# Patient Record
Sex: MALE | Employment: FULL TIME | URBAN - METROPOLITAN AREA
[De-identification: names, ages, dates, MRNs, and addresses within clinical notes are randomized per-mention and may not be internally consistent; named-entity substitution may affect disease eponyms.]

---

## 2018-03-25 ENCOUNTER — HOSPITAL ENCOUNTER (EMERGENCY)
Facility: HOSPITAL | Age: 53
Discharge: HOME | End: 2018-03-25
Attending: EMERGENCY MEDICINE
Payer: COMMERCIAL

## 2018-03-25 ENCOUNTER — APPOINTMENT (OUTPATIENT)
Dept: CT IMAGING | Facility: HOSPITAL | Age: 53
End: 2018-03-25
Attending: EMERGENCY MEDICINE
Payer: COMMERCIAL

## 2018-03-25 ENCOUNTER — APPOINTMENT (OUTPATIENT)
Dept: MRI IMAGING | Facility: HOSPITAL | Age: 53
End: 2018-03-25
Attending: EMERGENCY MEDICINE
Payer: COMMERCIAL

## 2018-03-25 VITALS
HEIGHT: 76 IN | SYSTOLIC BLOOD PRESSURE: 184 MMHG | WEIGHT: 315 LBS | RESPIRATION RATE: 16 BRPM | DIASTOLIC BLOOD PRESSURE: 91 MMHG | TEMPERATURE: 98 F | OXYGEN SATURATION: 96 % | HEART RATE: 75 BPM | BODY MASS INDEX: 38.36 KG/M2

## 2018-03-25 DIAGNOSIS — I10 HYPERTENSION, UNSPECIFIED TYPE: Primary | ICD-10-CM

## 2018-03-25 LAB
BUN BLD-MCNC: 17 MG/DL (ref 8–20)
CALCIUM BLD-MCNC: 8.6 MG/DL (ref 8.3–10.3)
CHLORIDE: 108 MMOL/L (ref 101–111)
CO2: 23 MMOL/L (ref 22–32)
CREAT BLD-MCNC: 1.1 MG/DL (ref 0.7–1.3)
GLUCOSE BLD-MCNC: 115 MG/DL (ref 70–99)
POTASSIUM SERPL-SCNC: 3.8 MMOL/L (ref 3.6–5.1)
SODIUM SERPL-SCNC: 142 MMOL/L (ref 136–144)

## 2018-03-25 PROCEDURE — 70450 CT HEAD/BRAIN W/O DYE: CPT | Performed by: EMERGENCY MEDICINE

## 2018-03-25 PROCEDURE — 99285 EMERGENCY DEPT VISIT HI MDM: CPT

## 2018-03-25 PROCEDURE — 93005 ELECTROCARDIOGRAM TRACING: CPT

## 2018-03-25 PROCEDURE — 72141 MRI NECK SPINE W/O DYE: CPT | Performed by: EMERGENCY MEDICINE

## 2018-03-25 PROCEDURE — 80048 BASIC METABOLIC PNL TOTAL CA: CPT | Performed by: EMERGENCY MEDICINE

## 2018-03-25 PROCEDURE — 36415 COLL VENOUS BLD VENIPUNCTURE: CPT

## 2018-03-25 PROCEDURE — 72125 CT NECK SPINE W/O DYE: CPT | Performed by: EMERGENCY MEDICINE

## 2018-03-25 PROCEDURE — 93010 ELECTROCARDIOGRAM REPORT: CPT

## 2018-03-25 PROCEDURE — 90471 IMMUNIZATION ADMIN: CPT

## 2018-03-25 RX ORDER — MULTIVIT-MIN/FOLIC ACID/LUTEIN 400-250MCG
1 TABLET,CHEWABLE ORAL DAILY
COMMUNITY
End: 2018-08-03 | Stop reason: ALTCHOICE

## 2018-03-25 RX ORDER — AMLODIPINE BESYLATE 5 MG/1
5 TABLET ORAL DAILY
Qty: 30 TABLET | Refills: 0 | Status: SHIPPED | OUTPATIENT
Start: 2018-03-25 | End: 2018-04-24

## 2018-03-25 NOTE — ED INITIAL ASSESSMENT (HPI)
Patient arrives with c/o neck pain and abrasions to left side of face after falling this morning. Patient states he tripped over uneven sidewalk. C/O neck pain with palpation and when he bends his head down. Not UTD on tetanus.

## 2018-03-26 LAB
ATRIAL RATE: 79 BPM
P AXIS: 46 DEGREES
P-R INTERVAL: 204 MS
Q-T INTERVAL: 380 MS
QRS DURATION: 82 MS
QTC CALCULATION (BEZET): 435 MS
R AXIS: 16 DEGREES
T AXIS: 134 DEGREES
VENTRICULAR RATE: 79 BPM

## 2018-03-26 NOTE — ED PROVIDER NOTES
Patient Seen in: [facility] Emergency Department    History   Patient presents with:  Contusion (musculoskeletal)    Stated Complaint: trip and fall    HPI    58-year-old with a history of obesity presents for evaluation after a fall.   At 6:00 this Pulse 82   Temp 97.9 °F (36.6 °C) (Oral)   Resp 16   Ht 193 cm (6' 4\")   Wt (!) 151.5 kg   SpO2 96%   BMI 40.66 kg/m²         Physical Exam    General: Patient is awake, alert in no acute distress.    HEENT: Abrasion to the left forehead, nasal bridge, lef is chronic without acute edema. There is no acute bony injury to the cervical spine. 2.  Moderate multilevel degenerative changes of the cervical spine most pronounced at C6-7 with bilateral neural foraminal narrowing, right greater than left.      3. medications    AmLODIPine Besylate 5 MG Oral Tab  Take 1 tablet (5 mg total) by mouth daily.   Qty: 30 tablet Refills: 0

## 2018-08-02 ENCOUNTER — HOSPITAL (OUTPATIENT)
Dept: OTHER | Age: 53
End: 2018-08-02
Attending: SPECIALIST

## 2018-08-02 ENCOUNTER — CHARTING TRANS (OUTPATIENT)
Dept: OTHER | Age: 53
End: 2018-08-02

## 2018-08-03 ENCOUNTER — TELEPHONE (OUTPATIENT)
Dept: INTERNAL MEDICINE CLINIC | Facility: CLINIC | Age: 53
End: 2018-08-03

## 2018-08-03 ENCOUNTER — LAB ENCOUNTER (OUTPATIENT)
Dept: LAB | Age: 53
End: 2018-08-03
Attending: STUDENT IN AN ORGANIZED HEALTH CARE EDUCATION/TRAINING PROGRAM
Payer: COMMERCIAL

## 2018-08-03 ENCOUNTER — OFFICE VISIT (OUTPATIENT)
Dept: INTERNAL MEDICINE CLINIC | Facility: CLINIC | Age: 53
End: 2018-08-03
Payer: COMMERCIAL

## 2018-08-03 VITALS
HEIGHT: 76 IN | SYSTOLIC BLOOD PRESSURE: 144 MMHG | DIASTOLIC BLOOD PRESSURE: 96 MMHG | HEART RATE: 76 BPM | WEIGHT: 315 LBS | RESPIRATION RATE: 16 BRPM | BODY MASS INDEX: 38.36 KG/M2 | TEMPERATURE: 98 F

## 2018-08-03 DIAGNOSIS — Z13.0 SCREENING FOR IRON DEFICIENCY ANEMIA: ICD-10-CM

## 2018-08-03 DIAGNOSIS — I10 ESSENTIAL HYPERTENSION: Primary | ICD-10-CM

## 2018-08-03 DIAGNOSIS — Z13.29 SCREENING FOR THYROID DISORDER: ICD-10-CM

## 2018-08-03 DIAGNOSIS — Z13.1 SCREENING FOR DIABETES MELLITUS: ICD-10-CM

## 2018-08-03 DIAGNOSIS — Z13.228 SCREENING FOR METABOLIC DISORDER: ICD-10-CM

## 2018-08-03 DIAGNOSIS — G47.33 OSA (OBSTRUCTIVE SLEEP APNEA): ICD-10-CM

## 2018-08-03 DIAGNOSIS — Z13.220 SCREENING FOR LIPOID DISORDERS: ICD-10-CM

## 2018-08-03 DIAGNOSIS — Z12.5 SPECIAL SCREENING FOR MALIGNANT NEOPLASM OF PROSTATE: ICD-10-CM

## 2018-08-03 DIAGNOSIS — I10 ESSENTIAL HYPERTENSION: ICD-10-CM

## 2018-08-03 DIAGNOSIS — Z13.89 SCREENING FOR GENITOURINARY CONDITION: ICD-10-CM

## 2018-08-03 DIAGNOSIS — E55.9 VITAMIN D DEFICIENCY: ICD-10-CM

## 2018-08-03 DIAGNOSIS — Z00.00 ENCOUNTER FOR ANNUAL PHYSICAL EXAM: Primary | ICD-10-CM

## 2018-08-03 LAB
ALBUMIN SERPL-MCNC: 4 G/DL (ref 3.5–4.8)
ALBUMIN/GLOB SERPL: 1.1 {RATIO} (ref 1–2)
ALP LIVER SERPL-CCNC: 97 U/L (ref 45–117)
ALT SERPL-CCNC: 34 U/L (ref 17–63)
ANION GAP SERPL CALC-SCNC: 5 MMOL/L (ref 0–18)
AST SERPL-CCNC: 18 U/L (ref 15–41)
BASOPHILS # BLD AUTO: 0.05 X10(3) UL (ref 0–0.1)
BASOPHILS NFR BLD AUTO: 0.9 %
BILIRUB SERPL-MCNC: 0.7 MG/DL (ref 0.1–2)
BUN BLD-MCNC: 13 MG/DL (ref 8–20)
BUN/CREAT SERPL: 12.3 (ref 10–20)
CALCIUM BLD-MCNC: 8.9 MG/DL (ref 8.3–10.3)
CHLORIDE SERPL-SCNC: 107 MMOL/L (ref 101–111)
CHOLEST SMN-MCNC: 166 MG/DL (ref ?–200)
CO2 SERPL-SCNC: 29 MMOL/L (ref 22–32)
COMPLEXED PSA SERPL-MCNC: 0.53 NG/ML (ref 0.01–4)
CREAT BLD-MCNC: 1.06 MG/DL (ref 0.7–1.3)
EOSINOPHIL # BLD AUTO: 0.21 X10(3) UL (ref 0–0.3)
EOSINOPHIL NFR BLD AUTO: 3.9 %
ERYTHROCYTE [DISTWIDTH] IN BLOOD BY AUTOMATED COUNT: 13.3 % (ref 11.5–16)
EST. AVERAGE GLUCOSE BLD GHB EST-MCNC: 94 MG/DL (ref 68–126)
GLOBULIN PLAS-MCNC: 3.7 G/DL (ref 2.5–3.7)
GLUCOSE BLD-MCNC: 96 MG/DL (ref 70–99)
HBA1C MFR BLD HPLC: 4.9 % (ref ?–5.7)
HCT VFR BLD AUTO: 45.8 % (ref 37–53)
HDLC SERPL-MCNC: 29 MG/DL (ref 40–59)
HGB BLD-MCNC: 16.1 G/DL (ref 13–17)
IMMATURE GRANULOCYTE COUNT: 0.03 X10(3) UL (ref 0–1)
IMMATURE GRANULOCYTE RATIO %: 0.6 %
LDLC SERPL CALC-MCNC: 69 MG/DL (ref ?–100)
LYMPHOCYTES # BLD AUTO: 1.19 X10(3) UL (ref 0.9–4)
LYMPHOCYTES NFR BLD AUTO: 22.1 %
M PROTEIN MFR SERPL ELPH: 7.7 G/DL (ref 6.1–8.3)
MCH RBC QN AUTO: 30.9 PG (ref 27–33.2)
MCHC RBC AUTO-ENTMCNC: 35.2 G/DL (ref 31–37)
MCV RBC AUTO: 87.9 FL (ref 80–99)
MONOCYTES # BLD AUTO: 0.42 X10(3) UL (ref 0.1–1)
MONOCYTES NFR BLD AUTO: 7.8 %
NEUTROPHIL ABS PRELIM: 3.49 X10 (3) UL (ref 1.3–6.7)
NEUTROPHILS # BLD AUTO: 3.49 X10(3) UL (ref 1.3–6.7)
NEUTROPHILS NFR BLD AUTO: 64.7 %
NONHDLC SERPL-MCNC: 137 MG/DL (ref ?–130)
OSMOLALITY SERPL CALC.SUM OF ELEC: 292 MOSM/KG (ref 275–295)
PLATELET # BLD AUTO: 163 10(3)UL (ref 150–450)
POTASSIUM SERPL-SCNC: 4.4 MMOL/L (ref 3.6–5.1)
RBC # BLD AUTO: 5.21 X10(6)UL (ref 4.3–5.7)
RED CELL DISTRIBUTION WIDTH-SD: 43.2 FL (ref 35.1–46.3)
SODIUM SERPL-SCNC: 141 MMOL/L (ref 136–144)
TRIGL SERPL-MCNC: 338 MG/DL (ref 30–149)
TSI SER-ACNC: 2.29 MIU/ML (ref 0.35–5.5)
VIT D+METAB SERPL-MCNC: 18.4 NG/ML (ref 30–100)
VLDLC SERPL CALC-MCNC: 68 MG/DL (ref 0–30)
WBC # BLD AUTO: 5.4 X10(3) UL (ref 4–13)

## 2018-08-03 PROCEDURE — 36415 COLL VENOUS BLD VENIPUNCTURE: CPT | Performed by: STUDENT IN AN ORGANIZED HEALTH CARE EDUCATION/TRAINING PROGRAM

## 2018-08-03 PROCEDURE — 80061 LIPID PANEL: CPT | Performed by: STUDENT IN AN ORGANIZED HEALTH CARE EDUCATION/TRAINING PROGRAM

## 2018-08-03 PROCEDURE — 82306 VITAMIN D 25 HYDROXY: CPT | Performed by: STUDENT IN AN ORGANIZED HEALTH CARE EDUCATION/TRAINING PROGRAM

## 2018-08-03 PROCEDURE — 84153 ASSAY OF PSA TOTAL: CPT | Performed by: STUDENT IN AN ORGANIZED HEALTH CARE EDUCATION/TRAINING PROGRAM

## 2018-08-03 PROCEDURE — 83036 HEMOGLOBIN GLYCOSYLATED A1C: CPT | Performed by: STUDENT IN AN ORGANIZED HEALTH CARE EDUCATION/TRAINING PROGRAM

## 2018-08-03 PROCEDURE — 80050 GENERAL HEALTH PANEL: CPT | Performed by: STUDENT IN AN ORGANIZED HEALTH CARE EDUCATION/TRAINING PROGRAM

## 2018-08-03 PROCEDURE — 99386 PREV VISIT NEW AGE 40-64: CPT | Performed by: STUDENT IN AN ORGANIZED HEALTH CARE EDUCATION/TRAINING PROGRAM

## 2018-08-03 RX ORDER — LISINOPRIL 20 MG/1
20 TABLET ORAL DAILY
Qty: 30 TABLET | Refills: 0 | Status: SHIPPED | OUTPATIENT
Start: 2018-08-03 | End: 2018-09-19 | Stop reason: ALTCHOICE

## 2018-08-03 RX ORDER — LISINOPRIL 10 MG/1
20 TABLET ORAL DAILY
COMMUNITY
End: 2018-08-03

## 2018-08-03 RX ORDER — DIPHENOXYLATE HYDROCHLORIDE AND ATROPINE SULFATE 2.5; .025 MG/1; MG/1
1 TABLET ORAL
COMMUNITY

## 2018-08-03 NOTE — PROGRESS NOTES
[address] [facility]    REASON FOR VISIT:    Kay Randolph is a 47year old male who presents for an [address]. Current Complaints:  Reports compliance with the medications, denies any side effects.   HTN: reports he has been diagno found for: GONOCOCCUS    HIV Screening For all adults age 22-65, older adults at increased risk No results found for: HIV    Syphilis Screening Screen if pregnant or high risk No results found for: RPR    Hepatitis C Screening Screen those at high risk plu Encounters:  08/03/18 : (!) 350 lb  03/25/18 : (!) 334 lb  03/25/18 : (!) 334 lb    Body mass index is 42.6 kg/m². Constitutional: He appears well-developed, nourished, and his stated age.  Vital signs reviewed  Pleasant man   Head: Normocephalic and atr Body mass index is 42.6 kg/m². Recommended low fat diet and aerobic exercise 30 minutes three times weekly. Health maintenance: will check fasting Lipids, CMP, CBC, TSH, UA, HbA1C and PSA. Vaccinations: Tdap utd. Screening colonoscopy: utd.   Pt info h Tetanus     Immunization History   Administered Date(s) Administered   • TDAP 04/21/2010, 03/25/2018       Influenza Annually   Pneumococcal if high risk   Td/Tdap once then every 10 years   HPV Males 11-21   Zoster (Shingles) 60 and older: one dose   [place] [facility])

## 2018-08-03 NOTE — TELEPHONE ENCOUNTER
Pt says he discussed with Dr Kathy Marquis increasing his blood pressure medication today at his appointment but his pharmacy has not received the prescription - please send to [facility] on file.

## 2018-08-03 NOTE — PATIENT INSTRUCTIONS
Prevention Guidelines, Men Ages 48 to 59  Screening tests and vaccines are an important part of managing your health. A screening test is done to find possible disorders or diseases in people who don't have any symptoms.  The goal is to find a disease ear High cholesterol or triglycerides All men in this age group At least every 5 years   HIV Men at increased risk for infection – talk with your healthcare provider At routine exams   Lung cancer Adults age 54 to [de-identified] who have smoked Yearly screening in smokers Pneumococcal conjugate vaccine (PCV13) and pneumococcal polysaccharide vaccine (PPSV23) Men at increased risk for infection – talk with your healthcare provider PCV13: 1 dose ages 23 to 72 (protects against 13 types of pneumococcal bacteria)     PPSV23: 1

## 2018-08-06 ENCOUNTER — TELEPHONE (OUTPATIENT)
Dept: INTERNAL MEDICINE CLINIC | Facility: CLINIC | Age: 53
End: 2018-08-06

## 2018-08-06 DIAGNOSIS — E55.9 VITAMIN D DEFICIENCY: Primary | ICD-10-CM

## 2018-08-06 RX ORDER — ERGOCALCIFEROL 1.25 MG/1
50000 CAPSULE ORAL WEEKLY
Qty: 12 CAPSULE | Refills: 0 | Status: SHIPPED | OUTPATIENT
Start: 2018-08-06 | End: 2018-12-10 | Stop reason: ALTCHOICE

## 2018-08-06 NOTE — TELEPHONE ENCOUNTER
----- Message from Bishop Leyva MD sent at 8/4/2018 10:00 AM CDT -----  Result as follows: No anemia, no signs of diabetes, normal PSA, liver and renal function, normal UA. Normal thyroid function.   Normal total cholesterol, but elevated triglycerides,

## 2018-08-06 NOTE — TELEPHONE ENCOUNTER
Results and recommendations d/w pt he expressed understanding. Rx sent to [facility] and lab order placed.

## 2018-09-05 ENCOUNTER — OFFICE VISIT (OUTPATIENT)
Dept: INTERNAL MEDICINE CLINIC | Facility: CLINIC | Age: 53
End: 2018-09-05
Payer: COMMERCIAL

## 2018-09-05 ENCOUNTER — TELEPHONE (OUTPATIENT)
Dept: INTERNAL MEDICINE CLINIC | Facility: CLINIC | Age: 53
End: 2018-09-05

## 2018-09-05 VITALS
OXYGEN SATURATION: 98 % | RESPIRATION RATE: 16 BRPM | SYSTOLIC BLOOD PRESSURE: 132 MMHG | BODY MASS INDEX: 40.43 KG/M2 | HEIGHT: 74 IN | HEART RATE: 79 BPM | TEMPERATURE: 98 F | DIASTOLIC BLOOD PRESSURE: 90 MMHG | WEIGHT: 315 LBS

## 2018-09-05 DIAGNOSIS — Z91.89 10 YEAR RISK OF MI OR STROKE 7.5% OR GREATER: ICD-10-CM

## 2018-09-05 DIAGNOSIS — Z79.899 LONG-TERM USE OF HIGH-RISK MEDICATION: ICD-10-CM

## 2018-09-05 DIAGNOSIS — E78.2 MIXED HYPERLIPIDEMIA: ICD-10-CM

## 2018-09-05 DIAGNOSIS — B35.1 ONYCHOMYCOSIS OF TOENAIL: ICD-10-CM

## 2018-09-05 DIAGNOSIS — I10 ESSENTIAL HYPERTENSION: Primary | ICD-10-CM

## 2018-09-05 PROCEDURE — 99214 OFFICE O/P EST MOD 30 MIN: CPT | Performed by: STUDENT IN AN ORGANIZED HEALTH CARE EDUCATION/TRAINING PROGRAM

## 2018-09-05 RX ORDER — TERBINAFINE HYDROCHLORIDE 250 MG/1
250 TABLET ORAL DAILY
Qty: 90 TABLET | Refills: 0 | Status: SHIPPED | OUTPATIENT
Start: 2018-09-05 | End: 2018-10-05 | Stop reason: ALTCHOICE

## 2018-09-05 RX ORDER — ATORVASTATIN CALCIUM 20 MG/1
20 TABLET, FILM COATED ORAL NIGHTLY
Qty: 90 TABLET | Refills: 0 | Status: SHIPPED | OUTPATIENT
Start: 2018-09-05 | End: 2018-10-19

## 2018-09-05 RX ORDER — LOSARTAN POTASSIUM 100 MG/1
100 TABLET ORAL DAILY
Qty: 30 TABLET | Refills: 0 | Status: SHIPPED | OUTPATIENT
Start: 2018-09-05 | End: 2018-09-19

## 2018-09-05 NOTE — PROGRESS NOTES
[facility]    REASON FOR VISIT:    Current Complaints: Patient presents with:  Blood Pressure: 1 m fu  Toenail Fungus:  All toes  Derm Problem: Rash right lower leg      HPI:  Sonia Boos is a 47year old male who presents for 2 weeks f/u visual disturbance. Respiratory: Negative for cough and shortness of breath. Cardiovascular: Negative for chest pain and palpitations. Gastrointestinal: Negative for nausea, vomiting, abdominal pain and diarrhea.    Genitourinary: Negative for urgenc Landry Larios is a 47year old male who presents with    Ree Hole was seen today for blood pressure, toenail fungus and derm problem. Diagnoses and all orders for this visit:    Essential hypertension  Not at goal with Lisinopril 20 mg.  Additionally he deve total) by mouth daily. Medications side effects, risk and benefits discussed in length. After visit instructions are provided to the patient. The patient indicates understanding of these issues and agrees to the treatment plan.   The patient is asked to

## 2018-09-05 NOTE — PATIENT INSTRUCTIONS
Nail Fungal Infection  A nail fungal infection changes the way fingernails and toenails look. They may thicken, discolor, change shape, or split. This condition is hard to treat because nails grow slowly and have limited blood supply.  The infection often Call your healthcare provider right away if any of these occur:  · Skin by the nail becomes red, swollen, painful, or drains pus (a creamy yellow or white liquid)  · Side effects from oral anti-fungal medicines  Date Last Reviewed: 8/1/2016 © 2000-2018 Th · signs and symptoms of liver injury like dark yellow or brown urine; general ill feeling or flu-like symptoms; light-belly pain; unusually weak or tired; yellowing of the eyes or skin  · signs and symptoms of muscle injury like dark urine; trouble passing · an unusual or allergic reaction to atorvastatin, other medicines, foods, dyes, or preservatives  · pregnant or trying to get pregnant  · breast-feeding  What should I watch for while using this medicine?   Visit your doctor or health care professional for

## 2018-09-11 ENCOUNTER — HOSPITAL (OUTPATIENT)
Dept: OTHER | Age: 53
End: 2018-09-11
Attending: SPECIALIST

## 2018-09-19 ENCOUNTER — OFFICE VISIT (OUTPATIENT)
Dept: INTERNAL MEDICINE CLINIC | Facility: CLINIC | Age: 53
End: 2018-09-19
Payer: COMMERCIAL

## 2018-09-19 VITALS
SYSTOLIC BLOOD PRESSURE: 146 MMHG | TEMPERATURE: 98 F | RESPIRATION RATE: 16 BRPM | HEIGHT: 74 IN | WEIGHT: 315 LBS | HEART RATE: 93 BPM | DIASTOLIC BLOOD PRESSURE: 98 MMHG | BODY MASS INDEX: 40.43 KG/M2 | OXYGEN SATURATION: 98 %

## 2018-09-19 DIAGNOSIS — I10 ESSENTIAL HYPERTENSION: Primary | ICD-10-CM

## 2018-09-19 PROCEDURE — 99213 OFFICE O/P EST LOW 20 MIN: CPT | Performed by: STUDENT IN AN ORGANIZED HEALTH CARE EDUCATION/TRAINING PROGRAM

## 2018-09-19 RX ORDER — LOSARTAN POTASSIUM 100 MG/1
100 TABLET ORAL DAILY
Qty: 90 TABLET | Refills: 0 | Status: SHIPPED | OUTPATIENT
Start: 2018-09-19 | End: 2018-10-19

## 2018-09-19 RX ORDER — AMLODIPINE BESYLATE 10 MG/1
10 TABLET ORAL DAILY
Qty: 30 TABLET | Refills: 0 | Status: SHIPPED | OUTPATIENT
Start: 2018-09-19 | End: 2018-10-05

## 2018-09-19 NOTE — PROGRESS NOTES
[facility]    REASON FOR VISIT:    Current Complaints: Patient presents with:  Blood Pressure      HPI:  Mike Mckeon is a 47year old male who presents for 2 week HTN f/u.   His medications were adjusted last time with d/c of Lisinopril ( Beth Alvarenga ) 146/98   Pulse 93   Temp 98 °F (36.7 °C)   Resp 16   Ht 74\"   Wt (!) 348 lb   SpO2 98%   BMI 44.68 kg/m²    Wt Readings from Last 6 Encounters:  09/19/18 : (!) 348 lb  09/05/18 : (!) 348 lb 11.2 oz  08/03/18 : (!) 350 lb  03/25/18 : (!) 334 lb  03/25/ Take 1 tablet (10 mg total) by mouth daily. -     losartan 100 MG Oral Tab; Take 1 tablet (100 mg total) by mouth daily. Medications side effects, risk and benefits discussed in length. After visit instructions are provided to the patient.   The patie

## 2018-09-20 ENCOUNTER — TELEPHONE (OUTPATIENT)
Dept: INTERNAL MEDICINE CLINIC | Facility: CLINIC | Age: 53
End: 2018-09-20

## 2018-09-20 NOTE — TELEPHONE ENCOUNTER
Vikcy is calling to follow up on a prior authorization for terbinasine (Lamisil). Please advise. Thank you!

## 2018-09-21 NOTE — TELEPHONE ENCOUNTER
Received notice that insurance info is not in [place]. Called pt to see if he has a RX card, left message.

## 2018-10-05 ENCOUNTER — OFFICE VISIT (OUTPATIENT)
Dept: INTERNAL MEDICINE CLINIC | Facility: CLINIC | Age: 53
End: 2018-10-05
Payer: COMMERCIAL

## 2018-10-05 VITALS
HEART RATE: 78 BPM | HEIGHT: 74 IN | TEMPERATURE: 98 F | BODY MASS INDEX: 40.43 KG/M2 | OXYGEN SATURATION: 98 % | SYSTOLIC BLOOD PRESSURE: 138 MMHG | RESPIRATION RATE: 16 BRPM | DIASTOLIC BLOOD PRESSURE: 88 MMHG | WEIGHT: 315 LBS

## 2018-10-05 DIAGNOSIS — I10 ESSENTIAL HYPERTENSION: ICD-10-CM

## 2018-10-05 PROCEDURE — 99213 OFFICE O/P EST LOW 20 MIN: CPT | Performed by: STUDENT IN AN ORGANIZED HEALTH CARE EDUCATION/TRAINING PROGRAM

## 2018-10-05 RX ORDER — AMLODIPINE BESYLATE 10 MG/1
10 TABLET ORAL DAILY
Qty: 90 TABLET | Refills: 0 | Status: SHIPPED | OUTPATIENT
Start: 2018-10-05 | End: 2018-12-10

## 2018-10-05 NOTE — PROGRESS NOTES
[facility]    REASON FOR VISIT:    Current Complaints: Patient presents with: Follow - Up: HTN      HPI:  Luli Luna is a 46year old male who presents for 1 month f/u for HTN. His medications were adjusted at the last OV.   Patient ta Negative for color change and rash.      EXAM:   /88   Pulse 78   Temp 98.2 °F (36.8 °C) (Oral)   Resp 16   Ht 74\"   Wt (!) 353 lb   SpO2 98%   BMI 45.32 kg/m²    Wt Readings from Last 6 Encounters:  10/05/18 : (!) 353 lb  09/19/18 : (!) 348 lb  09/0 daily.    Medications side effects, risk and benefits discussed in length. After visit instructions are provided to the patient. The patient indicates understanding of these issues and agrees to the treatment plan.     Imaging & Consults:  None       JAMES

## 2018-10-09 ENCOUNTER — HOSPITAL (OUTPATIENT)
Dept: OTHER | Age: 53
End: 2018-10-09
Attending: SPECIALIST

## 2018-10-12 ENCOUNTER — CHARTING TRANS (OUTPATIENT)
Dept: OTHER | Age: 53
End: 2018-10-12

## 2018-10-19 DIAGNOSIS — I10 ESSENTIAL HYPERTENSION: ICD-10-CM

## 2018-10-19 DIAGNOSIS — E78.2 MIXED HYPERLIPIDEMIA: ICD-10-CM

## 2018-10-19 RX ORDER — ATORVASTATIN CALCIUM 20 MG/1
20 TABLET, FILM COATED ORAL NIGHTLY
Qty: 90 TABLET | Refills: 0 | Status: SHIPPED | OUTPATIENT
Start: 2018-10-19 | End: 2018-12-10

## 2018-10-19 RX ORDER — LOSARTAN POTASSIUM 100 MG/1
100 TABLET ORAL DAILY
Qty: 90 TABLET | Refills: 0 | Status: SHIPPED | OUTPATIENT
Start: 2018-10-19 | End: 2018-11-05

## 2018-11-05 ENCOUNTER — NURSE ONLY (OUTPATIENT)
Dept: INTERNAL MEDICINE CLINIC | Facility: CLINIC | Age: 53
End: 2018-11-05
Payer: COMMERCIAL

## 2018-11-05 VITALS — DIASTOLIC BLOOD PRESSURE: 87 MMHG | SYSTOLIC BLOOD PRESSURE: 155 MMHG

## 2018-11-05 RX ORDER — LOSARTAN POTASSIUM AND HYDROCHLOROTHIAZIDE 12.5; 1 MG/1; MG/1
1 TABLET ORAL DAILY
Qty: 30 TABLET | Refills: 0 | Status: SHIPPED | OUTPATIENT
Start: 2018-11-05 | End: 2018-12-04

## 2018-11-05 RX ORDER — LOSARTAN POTASSIUM AND HYDROCHLOROTHIAZIDE 12.5; 1 MG/1; MG/1
1 TABLET ORAL DAILY
COMMUNITY
End: 2018-11-05 | Stop reason: CLARIF

## 2018-11-05 NOTE — PROGRESS NOTES
Dr. Jacky Spain saw patient, advised patient to discontinue Losartan 100mg, and begin Losartan-HCTZ 100-12.5mg, ordered to pharmacy. Patient is aware to  medication. Scheduled nurse visit for 12/5/18 for BP check.

## 2018-11-05 NOTE — PROGRESS NOTES
Patient in office for blood pressure check. Patients blood pressure taken on left arm, whilst sitting. BP reading 155/87. Advised patient to wait in room, Dr. Shari Ramos notified of reading, to advise patient.

## 2018-12-05 RX ORDER — LOSARTAN POTASSIUM AND HYDROCHLOROTHIAZIDE 12.5; 1 MG/1; MG/1
TABLET ORAL
Qty: 90 TABLET | Refills: 0 | Status: SHIPPED | OUTPATIENT
Start: 2018-12-05 | End: 2019-03-03

## 2018-12-10 ENCOUNTER — OFFICE VISIT (OUTPATIENT)
Dept: INTERNAL MEDICINE CLINIC | Facility: CLINIC | Age: 53
End: 2018-12-10
Payer: COMMERCIAL

## 2018-12-10 VITALS
OXYGEN SATURATION: 95 % | DIASTOLIC BLOOD PRESSURE: 90 MMHG | TEMPERATURE: 98 F | WEIGHT: 315 LBS | BODY MASS INDEX: 40.43 KG/M2 | HEIGHT: 74 IN | RESPIRATION RATE: 16 BRPM | HEART RATE: 69 BPM | SYSTOLIC BLOOD PRESSURE: 148 MMHG

## 2018-12-10 DIAGNOSIS — E78.2 MIXED HYPERLIPIDEMIA: ICD-10-CM

## 2018-12-10 DIAGNOSIS — L30.9 ECZEMA OF LOWER LEG: ICD-10-CM

## 2018-12-10 DIAGNOSIS — I10 ESSENTIAL HYPERTENSION: Primary | ICD-10-CM

## 2018-12-10 DIAGNOSIS — E55.9 VITAMIN D DEFICIENCY: ICD-10-CM

## 2018-12-10 PROCEDURE — 99214 OFFICE O/P EST MOD 30 MIN: CPT | Performed by: STUDENT IN AN ORGANIZED HEALTH CARE EDUCATION/TRAINING PROGRAM

## 2018-12-10 RX ORDER — AMLODIPINE BESYLATE 10 MG/1
10 TABLET ORAL DAILY
Qty: 90 TABLET | Refills: 1 | Status: SHIPPED | OUTPATIENT
Start: 2018-12-10 | End: 2019-11-11 | Stop reason: ALTCHOICE

## 2018-12-10 RX ORDER — ATORVASTATIN CALCIUM 20 MG/1
20 TABLET, FILM COATED ORAL NIGHTLY
Qty: 90 TABLET | Refills: 1 | Status: SHIPPED | OUTPATIENT
Start: 2018-12-10 | End: 2019-11-11

## 2018-12-10 RX ORDER — BETAMETHASONE DIPROPIONATE 0.05 %
1 OINTMENT (GRAM) TOPICAL 2 TIMES DAILY
Qty: 50 G | Refills: 0 | Status: SHIPPED | OUTPATIENT
Start: 2018-12-10 | End: 2019-11-11 | Stop reason: ALTCHOICE

## 2018-12-10 NOTE — PROGRESS NOTES
[facility]    REASON FOR VISIT:    Current Complaints: Patient presents with:  Blood Pressure  Rash: red, itchy, itches it then it scabs right shin      HPI:  Sylvie Snow is a 46year old male who presents for HTN f/u.     Patient's blood Musculoskeletal: Negative for arthralgias and gait problem. Skin: Negative for color change and rash.      EXAM:   BP (!) 160/100 (BP Location: Right arm, Patient Position: Sitting, Cuff Size: large)   Pulse 69   Temp 98.3 °F (36.8 °C) (Oral)   Resp 16 hypertension  Uncontrolled due to medication non-compliance. Went over patient medication regimen and dosage, he verbalized understanding that his BP regimen includes 3 medications: Losartan-HCTZ 100-12.5 mg daily and Amlodipine 10 mg daily.    Continue ho

## 2019-01-18 DIAGNOSIS — I10 ESSENTIAL HYPERTENSION: ICD-10-CM

## 2019-01-21 RX ORDER — LOSARTAN POTASSIUM 100 MG/1
TABLET ORAL
Qty: 90 TABLET | Refills: 0 | OUTPATIENT
Start: 2019-01-21

## 2019-03-05 RX ORDER — LOSARTAN POTASSIUM AND HYDROCHLOROTHIAZIDE 12.5; 1 MG/1; MG/1
TABLET ORAL
Qty: 30 TABLET | Refills: 0 | Status: SHIPPED | OUTPATIENT
Start: 2019-03-05 | End: 2019-10-08

## 2019-03-12 ENCOUNTER — TELEPHONE (OUTPATIENT)
Dept: INTERNAL MEDICINE CLINIC | Facility: CLINIC | Age: 54
End: 2019-03-12

## 2019-03-12 NOTE — TELEPHONE ENCOUNTER
Pt calling to find out all the medications/dosages  prescribed for him and requested to be uploaded to [address] Patient also scheduled apt with Onur Almeida for a med check 03-19-19. Please advise. Thank you!

## 2019-03-20 ENCOUNTER — NURSE ONLY (OUTPATIENT)
Dept: INTERNAL MEDICINE CLINIC | Facility: CLINIC | Age: 54
End: 2019-03-20
Payer: COMMERCIAL

## 2019-03-20 VITALS — SYSTOLIC BLOOD PRESSURE: 138 MMHG | DIASTOLIC BLOOD PRESSURE: 82 MMHG

## 2019-10-08 DIAGNOSIS — I10 ESSENTIAL HYPERTENSION: Primary | ICD-10-CM

## 2019-10-08 RX ORDER — LOSARTAN POTASSIUM AND HYDROCHLOROTHIAZIDE 12.5; 1 MG/1; MG/1
1 TABLET ORAL
Qty: 30 TABLET | Refills: 0 | Status: SHIPPED | OUTPATIENT
Start: 2019-10-08 | End: 2019-10-15 | Stop reason: RX

## 2019-10-08 NOTE — TELEPHONE ENCOUNTER
Last OV relevant to medication: 12/10/18  Last refill date: 3/5/19     #/refills: 30/0  When pt was asked to return for OV: 2 weeks  Upcoming appt/reason: 11/11/19, med check    No new CMP ordered. Only refilled for #30 in March. Not taking for 6 months?

## 2019-10-09 DIAGNOSIS — I10 ESSENTIAL HYPERTENSION: ICD-10-CM

## 2019-10-09 RX ORDER — LOSARTAN POTASSIUM AND HYDROCHLOROTHIAZIDE 12.5; 1 MG/1; MG/1
1 TABLET ORAL
Qty: 30 TABLET | Refills: 0 | OUTPATIENT
Start: 2019-10-09

## 2019-10-15 ENCOUNTER — TELEPHONE (OUTPATIENT)
Dept: INTERNAL MEDICINE CLINIC | Facility: CLINIC | Age: 54
End: 2019-10-15

## 2019-10-15 DIAGNOSIS — I10 ESSENTIAL HYPERTENSION: Primary | ICD-10-CM

## 2019-10-15 RX ORDER — HYDROCHLOROTHIAZIDE 12.5 MG/1
12.5 TABLET ORAL DAILY
Qty: 30 TABLET | Refills: 0 | Status: SHIPPED | OUTPATIENT
Start: 2019-10-15 | End: 2019-11-11 | Stop reason: DRUGHIGH

## 2019-10-15 RX ORDER — LOSARTAN POTASSIUM 100 MG/1
100 TABLET ORAL DAILY
Qty: 30 TABLET | Refills: 0 | Status: SHIPPED | OUTPATIENT
Start: 2019-10-15 | End: 2019-11-11

## 2019-10-15 NOTE — TELEPHONE ENCOUNTER
Received fax from [facility] that combination losartan/HCTZ is on back order. New rx sent for losartan and HCTZ separately.

## 2019-10-16 ENCOUNTER — PATIENT MESSAGE (OUTPATIENT)
Dept: INTERNAL MEDICINE CLINIC | Facility: CLINIC | Age: 54
End: 2019-10-16

## 2019-10-16 NOTE — TELEPHONE ENCOUNTER
From: Doris Zurita  To: Shirley Mora MD  Sent: 10/16/2019 1:14 PM CDT  Subject: Prescription Question    [facility] states they can only fill this prescription with two pills and not one So they need a new prescription sent to them     [facility]  [phone]

## 2019-11-07 DIAGNOSIS — I10 ESSENTIAL HYPERTENSION: ICD-10-CM

## 2019-11-11 ENCOUNTER — OFFICE VISIT (OUTPATIENT)
Dept: INTERNAL MEDICINE CLINIC | Facility: CLINIC | Age: 54
End: 2019-11-11
Payer: COMMERCIAL

## 2019-11-11 VITALS
WEIGHT: 315 LBS | TEMPERATURE: 98 F | HEART RATE: 87 BPM | OXYGEN SATURATION: 95 % | SYSTOLIC BLOOD PRESSURE: 155 MMHG | RESPIRATION RATE: 16 BRPM | DIASTOLIC BLOOD PRESSURE: 92 MMHG | HEIGHT: 74 IN | BODY MASS INDEX: 40.43 KG/M2

## 2019-11-11 DIAGNOSIS — Z13.1 SCREENING FOR DIABETES MELLITUS: ICD-10-CM

## 2019-11-11 DIAGNOSIS — Z13.89 SCREENING FOR GENITOURINARY CONDITION: ICD-10-CM

## 2019-11-11 DIAGNOSIS — E78.2 MIXED HYPERLIPIDEMIA: ICD-10-CM

## 2019-11-11 DIAGNOSIS — I10 ESSENTIAL HYPERTENSION: Primary | ICD-10-CM

## 2019-11-11 DIAGNOSIS — Z23 NEED FOR VACCINATION: ICD-10-CM

## 2019-11-11 DIAGNOSIS — E55.9 VITAMIN D DEFICIENCY: ICD-10-CM

## 2019-11-11 DIAGNOSIS — Z13.0 SCREENING FOR IRON DEFICIENCY ANEMIA: ICD-10-CM

## 2019-11-11 DIAGNOSIS — Z13.29 SCREENING FOR THYROID DISORDER: ICD-10-CM

## 2019-11-11 DIAGNOSIS — Z12.5 SCREENING FOR MALIGNANT NEOPLASM OF PROSTATE: ICD-10-CM

## 2019-11-11 PROCEDURE — 99214 OFFICE O/P EST MOD 30 MIN: CPT | Performed by: STUDENT IN AN ORGANIZED HEALTH CARE EDUCATION/TRAINING PROGRAM

## 2019-11-11 PROCEDURE — 90686 IIV4 VACC NO PRSV 0.5 ML IM: CPT | Performed by: STUDENT IN AN ORGANIZED HEALTH CARE EDUCATION/TRAINING PROGRAM

## 2019-11-11 PROCEDURE — 90471 IMMUNIZATION ADMIN: CPT | Performed by: STUDENT IN AN ORGANIZED HEALTH CARE EDUCATION/TRAINING PROGRAM

## 2019-11-11 RX ORDER — HYDROCHLOROTHIAZIDE 12.5 MG/1
TABLET ORAL
Qty: 30 TABLET | Refills: 0 | OUTPATIENT
Start: 2019-11-11

## 2019-11-11 RX ORDER — HYDROCHLOROTHIAZIDE 12.5 MG/1
12.5 TABLET ORAL DAILY
Qty: 90 TABLET | Refills: 1 | Status: CANCELLED | OUTPATIENT
Start: 2019-11-11

## 2019-11-11 RX ORDER — LOSARTAN POTASSIUM 100 MG/1
TABLET ORAL
Qty: 30 TABLET | Refills: 0 | OUTPATIENT
Start: 2019-11-11

## 2019-11-11 RX ORDER — HYDROCHLOROTHIAZIDE 25 MG/1
25 TABLET ORAL DAILY
Qty: 90 TABLET | Refills: 0 | Status: SHIPPED | OUTPATIENT
Start: 2019-11-11 | End: 2020-02-05

## 2019-11-11 RX ORDER — LOSARTAN POTASSIUM 100 MG/1
100 TABLET ORAL DAILY
Qty: 90 TABLET | Refills: 1 | Status: SHIPPED | OUTPATIENT
Start: 2019-11-11 | End: 2020-05-07

## 2019-11-11 NOTE — PROGRESS NOTES
[facility]    REASON FOR VISIT:    Current Complaints: Patient presents with:  Medication Follow-Up: bp meds       HPI:  Fernando No is a 53year old male who presents for 6 months BP f/u.     HTN: Patient reports he has been taking medic (36.8 °C) (Oral)   Resp 16   Ht 74\"   Wt (!) 336 lb 9.6 oz (152.7 kg)   SpO2 95%   BMI 43.22 kg/m²    Wt Readings from Last 6 Encounters:  11/11/19 : (!) 336 lb 9.6 oz (152.7 kg)  12/10/18 : (!) 352 lb 3.2 oz (159.8 kg)  10/05/18 : (!) 353 lb (160.1 kg) monitoring with BP logging daily. Goal blood pressure at home = < 140/90 mmHg. Proper BP taking technique was discussed in length, handouts given. Patient to RTC in 2 weeks for BP recheck and to review results. -     losartan 100 MG Oral Tab;  Take 1

## 2019-11-11 NOTE — TELEPHONE ENCOUNTER
Last OV relevant to medication: 12/10/18  Last refill date: 10/15/19     #/refills: 30/0  When pt was asked to return for OV: 2 weeks for BP nurse visit  Upcoming appt/reason: 11/11/19, med check    Please address at appt today 3PM.    Lab Results   Component Value Date    GLU 96 08/03/2018    BUN 13 08/03/2018    BUNCREA 12.3 08/03/2018    CREATSERUM 1.06 08/03/2018    ANIONGAP 5 08/03/2018    GFRNAA 80 08/03/2018    GFRAA 92 08/03/2018    CA 8.9 08/03/2018    OSMOCALC 292 08/03/2018    ALKPHO 97 08/03/2018    AST 18 08/03/2018    ALT 34 08/03/2018    BILT 0.7 08/03/2018    TP 7.7 08/03/2018    ALB 4.0 08/03/2018    GLOBULIN 3.7 08/03/2018     08/03/2018    K 4.4 08/03/2018     08/03/2018    CO2 29.0 08/03/2018

## 2019-11-21 ENCOUNTER — LAB ENCOUNTER (OUTPATIENT)
Dept: LAB | Age: 54
End: 2019-11-21
Attending: STUDENT IN AN ORGANIZED HEALTH CARE EDUCATION/TRAINING PROGRAM
Payer: COMMERCIAL

## 2019-11-21 DIAGNOSIS — Z13.89 SCREENING FOR GENITOURINARY CONDITION: ICD-10-CM

## 2019-11-21 DIAGNOSIS — Z13.0 SCREENING FOR IRON DEFICIENCY ANEMIA: ICD-10-CM

## 2019-11-21 DIAGNOSIS — E78.2 MIXED HYPERLIPIDEMIA: ICD-10-CM

## 2019-11-21 DIAGNOSIS — Z13.29 SCREENING FOR THYROID DISORDER: ICD-10-CM

## 2019-11-21 DIAGNOSIS — I10 ESSENTIAL HYPERTENSION: ICD-10-CM

## 2019-11-21 DIAGNOSIS — Z12.5 SCREENING FOR MALIGNANT NEOPLASM OF PROSTATE: ICD-10-CM

## 2019-11-21 DIAGNOSIS — Z13.1 SCREENING FOR DIABETES MELLITUS: ICD-10-CM

## 2019-11-21 DIAGNOSIS — E55.9 VITAMIN D DEFICIENCY: ICD-10-CM

## 2019-11-21 PROCEDURE — 82306 VITAMIN D 25 HYDROXY: CPT | Performed by: STUDENT IN AN ORGANIZED HEALTH CARE EDUCATION/TRAINING PROGRAM

## 2019-11-21 PROCEDURE — 80061 LIPID PANEL: CPT | Performed by: STUDENT IN AN ORGANIZED HEALTH CARE EDUCATION/TRAINING PROGRAM

## 2019-11-21 PROCEDURE — 81001 URINALYSIS AUTO W/SCOPE: CPT | Performed by: STUDENT IN AN ORGANIZED HEALTH CARE EDUCATION/TRAINING PROGRAM

## 2019-11-21 PROCEDURE — 36415 COLL VENOUS BLD VENIPUNCTURE: CPT | Performed by: STUDENT IN AN ORGANIZED HEALTH CARE EDUCATION/TRAINING PROGRAM

## 2019-11-21 PROCEDURE — 83036 HEMOGLOBIN GLYCOSYLATED A1C: CPT | Performed by: STUDENT IN AN ORGANIZED HEALTH CARE EDUCATION/TRAINING PROGRAM

## 2019-11-21 PROCEDURE — G0103 PSA SCREENING: HCPCS | Performed by: STUDENT IN AN ORGANIZED HEALTH CARE EDUCATION/TRAINING PROGRAM

## 2019-11-21 PROCEDURE — 80050 GENERAL HEALTH PANEL: CPT | Performed by: STUDENT IN AN ORGANIZED HEALTH CARE EDUCATION/TRAINING PROGRAM

## 2019-11-25 ENCOUNTER — OFFICE VISIT (OUTPATIENT)
Dept: INTERNAL MEDICINE CLINIC | Facility: CLINIC | Age: 54
End: 2019-11-25
Payer: COMMERCIAL

## 2019-11-25 VITALS
RESPIRATION RATE: 16 BRPM | SYSTOLIC BLOOD PRESSURE: 146 MMHG | HEIGHT: 74 IN | TEMPERATURE: 98 F | DIASTOLIC BLOOD PRESSURE: 90 MMHG | HEART RATE: 90 BPM | BODY MASS INDEX: 40.43 KG/M2 | WEIGHT: 315 LBS | OXYGEN SATURATION: 96 %

## 2019-11-25 DIAGNOSIS — E55.9 VITAMIN D DEFICIENCY: ICD-10-CM

## 2019-11-25 DIAGNOSIS — Z00.00 ENCOUNTER FOR ANNUAL PHYSICAL EXAM: Primary | ICD-10-CM

## 2019-11-25 DIAGNOSIS — L30.9 ECZEMA, UNSPECIFIED TYPE: ICD-10-CM

## 2019-11-25 DIAGNOSIS — I10 ESSENTIAL HYPERTENSION: ICD-10-CM

## 2019-11-25 PROCEDURE — 99396 PREV VISIT EST AGE 40-64: CPT | Performed by: STUDENT IN AN ORGANIZED HEALTH CARE EDUCATION/TRAINING PROGRAM

## 2019-11-25 RX ORDER — AMLODIPINE BESYLATE 5 MG/1
5 TABLET ORAL DAILY
Qty: 90 TABLET | Refills: 0 | Status: SHIPPED | OUTPATIENT
Start: 2019-11-25 | End: 2020-02-24

## 2019-11-25 RX ORDER — TRIAMCINOLONE ACETONIDE 5 MG/G
1 CREAM TOPICAL 3 TIMES DAILY
Qty: 45 G | Refills: 0 | Status: SHIPPED | OUTPATIENT
Start: 2019-11-25 | End: 2019-12-30

## 2019-11-25 RX ORDER — ERGOCALCIFEROL (VITAMIN D2) 1250 MCG
50000 CAPSULE ORAL WEEKLY
Qty: 12 CAPSULE | Refills: 0 | Status: SHIPPED | OUTPATIENT
Start: 2019-11-25 | End: 2021-09-23 | Stop reason: ALTCHOICE

## 2019-11-27 NOTE — PROGRESS NOTES
[facility]    REASON FOR VISIT:    Willian Yoon is a 53year old male who presents for an [address]. Current Complaints: feeling well. Bp is still not at goal after HCTZ dose increase to 25 mg qd.   Reports compliance wit for:  HCVAB    Tuberculosis Screen If high risk No components found for: PPDINDURAT      ALLERGIES:     Lisinopril              Coughing    CURRENT MEDICATIONS:   Current Outpatient Medications   Medication Sig Dispense Refill   • ergocalciferol 1.25 MG (50 for adenopathy. Does not bruise/bleed easily. Psychiatric/Behavioral: Negative for confusion and agitation. The patient is not nervous/anxious.       EXAM:   /90 (BP Location: Left arm, Patient Position: Sitting, Cuff Size: large)   Pulse 90   Temp Skin: Skin is warm. Right anterior shin lower thid erythematous raised rash in patches with multiple excoriations around. Psychiatric: He has a normal mood and affect.  His behavior is normal.     ASSESSMENT AND PLAN WITH OTHER RELEVANT CHRONIC CONDITION Besylate 5 MG Oral Tab; Take 1 tablet (5 mg total) by mouth daily. Eczema, unspecified type  Triamcinolone 0.5% cream to AA twice a day x 2 weeks then PRN.  Risks of topical steroid use were discussed, including skin lightening, atrophy, telangiectasias,

## 2019-12-18 ENCOUNTER — OFFICE VISIT (OUTPATIENT)
Dept: INTERNAL MEDICINE CLINIC | Facility: CLINIC | Age: 54
End: 2019-12-18
Payer: COMMERCIAL

## 2019-12-18 VITALS
HEIGHT: 74 IN | SYSTOLIC BLOOD PRESSURE: 138 MMHG | HEART RATE: 79 BPM | BODY MASS INDEX: 40.43 KG/M2 | TEMPERATURE: 98 F | RESPIRATION RATE: 14 BRPM | OXYGEN SATURATION: 96 % | WEIGHT: 315 LBS | DIASTOLIC BLOOD PRESSURE: 88 MMHG

## 2019-12-18 DIAGNOSIS — J06.9 ACUTE URI: ICD-10-CM

## 2019-12-18 DIAGNOSIS — I10 ESSENTIAL HYPERTENSION: Primary | ICD-10-CM

## 2019-12-18 PROCEDURE — 99214 OFFICE O/P EST MOD 30 MIN: CPT | Performed by: STUDENT IN AN ORGANIZED HEALTH CARE EDUCATION/TRAINING PROGRAM

## 2019-12-19 NOTE — PROGRESS NOTES
[facility]    REASON FOR VISIT:    Current Complaints: Patient presents with:  Blood Pressure  Sore Throat: sore throat, PND, 3-4 days, little bit of a cough       HPI:  Patricia Peterson is a 53year old male who presents for 2 weeks f/u for Negative for pain and visual disturbance. Respiratory: Negative for cough and shortness of breath. Cardiovascular: Negative for chest pain and palpitations. Gastrointestinal: Negative for nausea, vomiting, abdominal pain and diarrhea.    Giovany Samaniego intact. Normal reflexes. Skin: Skin is warm. No rash noted. No erythema.    Psychiatric: Normal mood and affect and behavior is normal.     ASSESSMENT AND PLAN:   Magalie Patel is a 53year old male who presents with    Ashwin Kumar was seen today for bl

## 2019-12-30 DIAGNOSIS — L30.9 ECZEMA, UNSPECIFIED TYPE: ICD-10-CM

## 2020-01-02 RX ORDER — TRIAMCINOLONE ACETONIDE 5 MG/G
1 CREAM TOPICAL 3 TIMES DAILY
Qty: 45 G | Refills: 1 | Status: SHIPPED | OUTPATIENT
Start: 2020-01-02 | End: 2020-06-05 | Stop reason: ALTCHOICE

## 2020-01-02 NOTE — TELEPHONE ENCOUNTER
Last OV relevant to medication: 11/25/19 pe (pt was seen 12/18/19 for htn)  Last refill date: 11/25/19     #/refills: 45g/0  When pt was asked to return for OV: 6 mos from most recent visit   Upcoming appt/reason: 6/5/2020 bp chk

## 2020-02-05 DIAGNOSIS — I10 ESSENTIAL HYPERTENSION: ICD-10-CM

## 2020-02-05 RX ORDER — HYDROCHLOROTHIAZIDE 25 MG/1
TABLET ORAL
Qty: 90 TABLET | Refills: 1 | Status: SHIPPED | OUTPATIENT
Start: 2020-02-05 | End: 2020-06-05

## 2020-02-19 DIAGNOSIS — I10 ESSENTIAL HYPERTENSION: ICD-10-CM

## 2020-02-19 DIAGNOSIS — E55.9 VITAMIN D DEFICIENCY: ICD-10-CM

## 2020-02-24 RX ORDER — AMLODIPINE BESYLATE 5 MG/1
TABLET ORAL
Qty: 90 TABLET | Refills: 1 | Status: SHIPPED | OUTPATIENT
Start: 2020-02-24 | End: 2020-06-05

## 2020-02-24 RX ORDER — ERGOCALCIFEROL 1.25 MG/1
CAPSULE ORAL
Qty: 12 CAPSULE | Refills: 0 | OUTPATIENT
Start: 2020-02-24

## 2020-02-24 NOTE — TELEPHONE ENCOUNTER
Rx request for Vitamin D denied. Due for for repeat Vitamin D level check. [employer] message sent as reminder.

## 2020-05-07 DIAGNOSIS — I10 ESSENTIAL HYPERTENSION: ICD-10-CM

## 2020-05-07 RX ORDER — LOSARTAN POTASSIUM 100 MG/1
TABLET ORAL
Qty: 90 TABLET | Refills: 0 | Status: SHIPPED | OUTPATIENT
Start: 2020-05-07 | End: 2020-06-05

## 2020-06-05 ENCOUNTER — OFFICE VISIT (OUTPATIENT)
Dept: INTERNAL MEDICINE CLINIC | Facility: CLINIC | Age: 55
End: 2020-06-05
Payer: COMMERCIAL

## 2020-06-05 VITALS
HEART RATE: 80 BPM | RESPIRATION RATE: 14 BRPM | DIASTOLIC BLOOD PRESSURE: 88 MMHG | OXYGEN SATURATION: 96 % | WEIGHT: 315 LBS | BODY MASS INDEX: 40.43 KG/M2 | HEIGHT: 74 IN | SYSTOLIC BLOOD PRESSURE: 134 MMHG | TEMPERATURE: 98 F

## 2020-06-05 DIAGNOSIS — Z12.5 SCREENING FOR MALIGNANT NEOPLASM OF PROSTATE: ICD-10-CM

## 2020-06-05 DIAGNOSIS — Z13.220 SCREENING FOR LIPOID DISORDERS: ICD-10-CM

## 2020-06-05 DIAGNOSIS — I10 ESSENTIAL HYPERTENSION: ICD-10-CM

## 2020-06-05 DIAGNOSIS — Z13.29 SCREENING FOR THYROID DISORDER: ICD-10-CM

## 2020-06-05 DIAGNOSIS — Z13.0 SCREENING FOR IRON DEFICIENCY ANEMIA: ICD-10-CM

## 2020-06-05 DIAGNOSIS — Z13.1 SCREENING FOR DIABETES MELLITUS: Primary | ICD-10-CM

## 2020-06-05 PROCEDURE — 99213 OFFICE O/P EST LOW 20 MIN: CPT | Performed by: STUDENT IN AN ORGANIZED HEALTH CARE EDUCATION/TRAINING PROGRAM

## 2020-06-05 RX ORDER — AMLODIPINE BESYLATE 5 MG/1
5 TABLET ORAL DAILY
Qty: 90 TABLET | Refills: 1 | Status: SHIPPED | OUTPATIENT
Start: 2020-06-05 | End: 2020-11-19

## 2020-06-05 RX ORDER — LOSARTAN POTASSIUM 100 MG/1
100 TABLET ORAL DAILY
Qty: 90 TABLET | Refills: 1 | Status: SHIPPED | OUTPATIENT
Start: 2020-06-05 | End: 2021-02-16

## 2020-06-05 RX ORDER — HYDROCHLOROTHIAZIDE 25 MG/1
25 TABLET ORAL DAILY
Qty: 90 TABLET | Refills: 1 | Status: SHIPPED | OUTPATIENT
Start: 2020-06-05 | End: 2020-11-19

## 2020-06-05 NOTE — PROGRESS NOTES
[facility]    REASON FOR VISIT:    Current Complaints: Patient presents with:   Follow - Up: BP Ck / Colon: 7/19/2017 due 7/19/2027, PE 11/25/2019 due 11/25/2020, PSA 11/21/19 due 11/21/2021      HPI:  Kimberly Llanes is a 53year old male wh Negative for arthralgias and gait problem. Skin: Negative for color change and rash.      EXAM:   /88 (BP Location: Left arm, Patient Position: Sitting, Cuff Size: large)   Pulse 80   Temp 98.1 °F (36.7 °C) (Oral)   Resp 14   Ht 74\"   Wt (!) 331 lb hypertension   Pt presents for a recheck of hypertension. BP is well controlled, no significant medication side effects noted. PLAN: will continue present medications, reviewed diet, exercise and weight control.  The patient indicates understanding of t

## 2020-06-08 ENCOUNTER — HOSPITAL ENCOUNTER (EMERGENCY)
Age: 55
Discharge: HOME | End: 2020-06-08
Attending: EMERGENCY MEDICINE

## 2020-06-08 VITALS
DIASTOLIC BLOOD PRESSURE: 85 MMHG | HEART RATE: 82 BPM | RESPIRATION RATE: 16 BRPM | SYSTOLIC BLOOD PRESSURE: 142 MMHG | OXYGEN SATURATION: 94 % | TEMPERATURE: 98.1 F

## 2020-06-08 DIAGNOSIS — S05.02XA INJURY OF CONJUNCTIVA AND CORNEAL ABRASION OF LEFT EYE WITHOUT FOREIGN BODY, INITIAL ENCOUNTER: Primary | ICD-10-CM

## 2020-06-08 PROCEDURE — 10002800 HB RX 250 W HCPCS: Performed by: EMERGENCY MEDICINE

## 2020-06-08 PROCEDURE — 10002801 HB RX 250 W/O HCPCS

## 2020-06-08 PROCEDURE — 90471 IMMUNIZATION ADMIN: CPT | Performed by: EMERGENCY MEDICINE

## 2020-06-08 PROCEDURE — 10002803 HB RX 637: Performed by: EMERGENCY MEDICINE

## 2020-06-08 PROCEDURE — 99282 EMERGENCY DEPT VISIT SF MDM: CPT

## 2020-06-08 PROCEDURE — 90715 TDAP VACCINE 7 YRS/> IM: CPT | Performed by: EMERGENCY MEDICINE

## 2020-06-08 RX ORDER — GENTAMICIN SULFATE 3 MG/ML
2 SOLUTION/ DROPS OPHTHALMIC ONCE
Status: COMPLETED | OUTPATIENT
Start: 2020-06-08 | End: 2020-06-08

## 2020-06-08 RX ORDER — TETRACAINE HYDROCHLORIDE 5 MG/ML
SOLUTION OPHTHALMIC
Status: DISCONTINUED
Start: 2020-06-08 | End: 2020-06-09 | Stop reason: HOSPADM

## 2020-06-08 RX ADMIN — TETANUS TOXOID, REDUCED DIPHTHERIA TOXOID AND ACELLULAR PERTUSSIS VACCINE, ADSORBED 0.5 ML: 5; 2.5; 8; 8; 2.5 SUSPENSION INTRAMUSCULAR at 22:35

## 2020-06-08 RX ADMIN — GENTAMICIN SULFATE 2 DROP: 3 SOLUTION/ DROPS OPHTHALMIC at 22:35

## 2020-06-08 ASSESSMENT — PAIN SCALES - GENERAL: PAINLEVEL_OUTOF10: 0

## 2020-11-19 DIAGNOSIS — I10 ESSENTIAL HYPERTENSION: ICD-10-CM

## 2020-11-19 RX ORDER — HYDROCHLOROTHIAZIDE 25 MG/1
25 TABLET ORAL DAILY
Qty: 90 TABLET | Refills: 0 | Status: SHIPPED | OUTPATIENT
Start: 2020-11-19 | End: 2021-03-05

## 2020-11-19 RX ORDER — AMLODIPINE BESYLATE 5 MG/1
5 TABLET ORAL DAILY
Qty: 90 TABLET | Refills: 0 | Status: SHIPPED | OUTPATIENT
Start: 2020-11-19 | End: 2021-02-16

## 2020-11-19 NOTE — TELEPHONE ENCOUNTER
Did the patient contact the pharmacy directly?:  No    Is patient out of meds or supply very low? :  1 week left    Medication Requested:  hydrochlorothiazide 25 MG Oral Tab    Dose:      Is patient requesting a 30 or 90 day supply?:  90    Pharmacy name an

## 2021-02-12 DIAGNOSIS — I10 ESSENTIAL HYPERTENSION: ICD-10-CM

## 2021-02-12 NOTE — TELEPHONE ENCOUNTER
Last refill on amlodipine #90 on 11/19/2020  Last refill on losartan #90 x 1 on 6/5/2020  Last office visit pertaining to refill on 6/5/2020  Patient was due for a physical on or around 12/5/2020  No future appointments.     BP Readings from Last 3 Encounte

## 2021-02-16 RX ORDER — AMLODIPINE BESYLATE 5 MG/1
TABLET ORAL
Qty: 30 TABLET | Refills: 0 | Status: SHIPPED | OUTPATIENT
Start: 2021-02-16 | End: 2022-01-05

## 2021-02-16 RX ORDER — LOSARTAN POTASSIUM 100 MG/1
TABLET ORAL
Qty: 30 TABLET | Refills: 0 | Status: SHIPPED | OUTPATIENT
Start: 2021-02-16 | End: 2021-03-04

## 2021-03-01 DIAGNOSIS — I10 ESSENTIAL HYPERTENSION: ICD-10-CM

## 2021-03-01 NOTE — TELEPHONE ENCOUNTER
Last refill #90 on 11/19/2020  Last office visit pertaining to refill on 6/5/2020  No future appointments.   Next office visit was due on or around 11/5/2020  Reminder letter sent

## 2021-03-04 DIAGNOSIS — I10 ESSENTIAL HYPERTENSION: ICD-10-CM

## 2021-03-04 NOTE — TELEPHONE ENCOUNTER
Last OV relevant to medication: 6/5/2020, BP  Last refill date: 2/16/21     #/refills: 30-0  When pt was asked to return for OV: 5 months  Upcoming appt/reason: none scheduled  Was pt informed of any over due labs: Yes  Lab Results   Component Value Date

## 2021-03-05 RX ORDER — LOSARTAN POTASSIUM 100 MG/1
100 TABLET ORAL DAILY
Qty: 30 TABLET | Refills: 0 | Status: SHIPPED | OUTPATIENT
Start: 2021-03-05 | End: 2021-10-01

## 2021-03-05 RX ORDER — HYDROCHLOROTHIAZIDE 25 MG/1
TABLET ORAL
Qty: 30 TABLET | Refills: 0 | Status: SHIPPED | OUTPATIENT
Start: 2021-03-05 | End: 2021-03-25

## 2021-03-25 DIAGNOSIS — I10 ESSENTIAL HYPERTENSION: ICD-10-CM

## 2021-03-25 RX ORDER — HYDROCHLOROTHIAZIDE 25 MG/1
25 TABLET ORAL DAILY
Qty: 30 TABLET | Refills: 0 | Status: SHIPPED | OUTPATIENT
Start: 2021-03-25 | End: 2021-10-01

## 2021-03-25 NOTE — TELEPHONE ENCOUNTER
Last OV relevant to medication: 6/5/2020  Last refill date: 3/5/21     #/refills: 30-0  When pt was asked to return for OV: 5 mth  Upcoming appt/reason: none  Was pt informed of any over due labs: yes  Lab Results   Component Value Date    GLU 86 11/21/201

## 2021-09-23 ENCOUNTER — OFFICE VISIT (OUTPATIENT)
Dept: INTERNAL MEDICINE CLINIC | Facility: CLINIC | Age: 56
End: 2021-09-23
Payer: COMMERCIAL

## 2021-09-23 VITALS
HEIGHT: 73.75 IN | SYSTOLIC BLOOD PRESSURE: 148 MMHG | OXYGEN SATURATION: 96 % | HEART RATE: 73 BPM | RESPIRATION RATE: 16 BRPM | DIASTOLIC BLOOD PRESSURE: 90 MMHG | TEMPERATURE: 98 F | WEIGHT: 315 LBS | BODY MASS INDEX: 40.86 KG/M2

## 2021-09-23 DIAGNOSIS — Z12.5 SCREENING FOR PROSTATE CANCER: ICD-10-CM

## 2021-09-23 DIAGNOSIS — E78.2 MIXED HYPERLIPIDEMIA: ICD-10-CM

## 2021-09-23 DIAGNOSIS — Z13.29 SCREENING FOR THYROID DISORDER: ICD-10-CM

## 2021-09-23 DIAGNOSIS — I10 ESSENTIAL HYPERTENSION: ICD-10-CM

## 2021-09-23 DIAGNOSIS — Z00.00 ENCOUNTER FOR ANNUAL PHYSICAL EXAM: Primary | ICD-10-CM

## 2021-09-23 DIAGNOSIS — B35.1 ONYCHOMYCOSIS OF TOENAIL: ICD-10-CM

## 2021-09-23 PROCEDURE — 99214 OFFICE O/P EST MOD 30 MIN: CPT | Performed by: NURSE PRACTITIONER

## 2021-09-23 PROCEDURE — 99396 PREV VISIT EST AGE 40-64: CPT | Performed by: NURSE PRACTITIONER

## 2021-09-23 PROCEDURE — 3077F SYST BP >= 140 MM HG: CPT | Performed by: NURSE PRACTITIONER

## 2021-09-23 PROCEDURE — 3080F DIAST BP >= 90 MM HG: CPT | Performed by: NURSE PRACTITIONER

## 2021-09-23 PROCEDURE — 3008F BODY MASS INDEX DOCD: CPT | Performed by: NURSE PRACTITIONER

## 2021-09-23 NOTE — PROGRESS NOTES
CHIEF COMPLAINT   Luke Evans is a 63year old male who presents for a complete physical exam, med check     HPI:   Here for physical, med check    Wt Readings from Last 6 Encounters:  09/23/21 : (!) 331 lb 9.6 oz (150.4 kg)  06/05/20 : (!) 331 lb • Blindness Brother    • Hypertension Brother       Social History:  Social History    Tobacco Use      Smoking status: Never Smoker      Smokeless tobacco: Never Used    Vaping Use      Vaping Use: Never used    Alcohol use: Yes    Drug use:  No 11/21/2019      Lab Results   Component Value Date    GLU 86 11/21/2019    BUN 15 11/21/2019    BUNCREA 14.7 11/21/2019    CREATSERUM 1.02 11/21/2019    ANIONGAP 3 11/21/2019    GFRNAA 82 11/21/2019    GFRAA 95 11/21/2019    CA 8.7 11/21/2019    OSMOCALC 2 physical, 6 months for med check

## 2021-09-23 NOTE — PATIENT INSTRUCTIONS
Check with your insurance to verify coverage for the Shingrix vaccine for shingles. Also check to see where you can go to get the vaccine. Flu shot recommended. Get your labs done. You should be fasting for at least 10 hours.  If you take a multivit pressure is when systolic is 659 or higher or the diastolic is 90 or higher  Home care  If you have high blood pressure, you should do what is listed below to lower your blood pressure.  If you are taking medicines for high blood pressure, these methods may biofeedback. · If your provider prescribed medicines, take them exactly as directed. Missing doses may cause your blood pressure get out of control. · If you miss a dose or doses, check with your healthcare provider or pharmacist about what to do.   · Con pressure rechecked. This is to make sure that the medicine is working and that you have no serious side effects. Keep all your follow up appointments. Write down medicine and blood pressure questions and bring them to your next appointment.  If you have pre

## 2021-09-24 ENCOUNTER — OFFICE VISIT (OUTPATIENT)
Dept: PODIATRY CLINIC | Facility: CLINIC | Age: 56
End: 2021-09-24
Payer: COMMERCIAL

## 2021-09-24 DIAGNOSIS — B35.1 ONYCHOMYCOSIS: Primary | ICD-10-CM

## 2021-09-24 PROCEDURE — 99203 OFFICE O/P NEW LOW 30 MIN: CPT | Performed by: PODIATRIST

## 2021-09-24 NOTE — PROGRESS NOTES
Willian Yoon is a 63year old male. Patient presents with:  Consult: Pt states he has toenail fungus, denies any pain         HPI:   This pleasant gentleman presents to the clinic with a chief complaint of toenail fungus.   The right toenail was lily feels well otherwise  SKIN: Refer to exam below  RESPIRATORY: denies shortness of breath with exertion  CARDIOVASCULAR: denies chest pain on exertion  GI: denies abdominal pain and denies heartburn  NEURO: denies headaches    EXAM:   There were no vitals t

## 2021-09-30 DIAGNOSIS — I10 ESSENTIAL HYPERTENSION: ICD-10-CM

## 2021-10-01 DIAGNOSIS — I10 ESSENTIAL HYPERTENSION: ICD-10-CM

## 2021-10-01 RX ORDER — HYDROCHLOROTHIAZIDE 25 MG/1
25 TABLET ORAL DAILY
Qty: 90 TABLET | Refills: 1 | Status: SHIPPED | OUTPATIENT
Start: 2021-10-01 | End: 2021-10-04

## 2021-10-01 RX ORDER — LOSARTAN POTASSIUM 100 MG/1
TABLET ORAL
Qty: 90 TABLET | Refills: 1 | Status: SHIPPED | OUTPATIENT
Start: 2021-10-01

## 2021-10-01 NOTE — TELEPHONE ENCOUNTER
Hypertension Medications Protocol Failed 10/01/2021 10:11 AM   Protocol Details  CMP or BMP in past 12 months    Last serum creatinine< 2.0    Appointment in past 6 or next 3 months       Last OV relevant to medication: 9/23/2021  Last refill date: 3/5/202

## 2021-10-01 NOTE — TELEPHONE ENCOUNTER
Hypertension Medications Protocol Failed 09/30/2021 05:51 PM   Protocol Details  CMP or BMP in past 12 months    Last serum creatinine< 2.0    Appointment in past 6 or next 3 months     Last OV relevant to medication: 9/23/2021  Last refill date: 3/5/2021

## 2021-10-04 RX ORDER — HYDROCHLOROTHIAZIDE 25 MG/1
25 TABLET ORAL DAILY
Qty: 90 TABLET | Refills: 1 | Status: SHIPPED | OUTPATIENT
Start: 2021-10-04

## 2021-10-06 ENCOUNTER — TELEPHONE (OUTPATIENT)
Dept: PODIATRY CLINIC | Facility: CLINIC | Age: 56
End: 2021-10-06

## 2021-10-06 DIAGNOSIS — B35.1 ONYCHOMYCOSIS: Primary | ICD-10-CM

## 2021-11-11 DIAGNOSIS — I10 ESSENTIAL HYPERTENSION: ICD-10-CM

## 2021-11-11 RX ORDER — LOSARTAN POTASSIUM 100 MG/1
TABLET ORAL
Qty: 90 TABLET | Refills: 1 | OUTPATIENT
Start: 2021-11-11

## 2021-11-11 RX ORDER — HYDROCHLOROTHIAZIDE 25 MG/1
25 TABLET ORAL DAILY
Qty: 90 TABLET | Refills: 1 | OUTPATIENT
Start: 2021-11-11

## 2021-11-11 NOTE — TELEPHONE ENCOUNTER
Last OV relevant to medication: 9/23/21  Last refill date: 10/1/21     #/refills: 90/1  When pt was asked to return for OV: 6 months  Upcoming appt/reason: none  Was pt informed of any over due labs: yes, [place] sent    Rx denied. Too soon.     Lab Results

## 2022-01-05 ENCOUNTER — TELEPHONE (OUTPATIENT)
Dept: INTERNAL MEDICINE CLINIC | Facility: CLINIC | Age: 57
End: 2022-01-05

## 2022-01-05 DIAGNOSIS — I10 ESSENTIAL HYPERTENSION: ICD-10-CM

## 2022-01-05 NOTE — TELEPHONE ENCOUNTER
Hypertension Medications Protocol Failed 01/05/2022 01:09 PM   Protocol Details  CMP or BMP in past 12 months    Last serum creatinine< 2.0    Appointment in past 6 or next 3 months        Last OV relevant to medication:9/23/2021  Last refill date: 2/16/2

## 2022-01-06 RX ORDER — AMLODIPINE BESYLATE 5 MG/1
5 TABLET ORAL DAILY
Qty: 90 TABLET | Refills: 0 | Status: SHIPPED | OUTPATIENT
Start: 2022-01-06

## 2022-01-06 NOTE — TELEPHONE ENCOUNTER
Can we check with him to see if he has been checking his BP. He was to send readings after last apt. If he has not checked it start checking it and bring record to 6 month apt in March. Thanks.

## 2022-01-10 NOTE — TELEPHONE ENCOUNTER
Patient called and did not have readings with him at the time and will bring an updated reading record to his 6 month appt

## 2022-03-14 ENCOUNTER — LAB ENCOUNTER (OUTPATIENT)
Dept: LAB | Age: 57
End: 2022-03-14
Attending: NURSE PRACTITIONER
Payer: COMMERCIAL

## 2022-03-14 DIAGNOSIS — Z12.5 SCREENING FOR PROSTATE CANCER: ICD-10-CM

## 2022-03-14 DIAGNOSIS — Z13.29 SCREENING FOR THYROID DISORDER: ICD-10-CM

## 2022-03-14 DIAGNOSIS — E78.2 MIXED HYPERLIPIDEMIA: ICD-10-CM

## 2022-03-14 DIAGNOSIS — B35.1 ONYCHOMYCOSIS: ICD-10-CM

## 2022-03-14 DIAGNOSIS — Z00.00 ENCOUNTER FOR ANNUAL PHYSICAL EXAM: ICD-10-CM

## 2022-03-14 LAB
ALBUMIN SERPL-MCNC: 4.2 G/DL (ref 3.4–5)
ALBUMIN/GLOB SERPL: 1.2 {RATIO} (ref 1–2)
ALP LIVER SERPL-CCNC: 88 U/L
ALT SERPL-CCNC: 27 U/L
ANION GAP SERPL CALC-SCNC: 5 MMOL/L (ref 0–18)
AST SERPL-CCNC: 22 U/L (ref 15–37)
BASOPHILS # BLD AUTO: 0.05 X10(3) UL (ref 0–0.2)
BASOPHILS NFR BLD AUTO: 1 %
BILIRUB DIRECT SERPL-MCNC: 0.3 MG/DL (ref 0–0.2)
BILIRUB SERPL-MCNC: 1.5 MG/DL (ref 0.1–2)
BUN BLD-MCNC: 12 MG/DL (ref 7–18)
CALCIUM BLD-MCNC: 9.5 MG/DL (ref 8.5–10.1)
CHLORIDE SERPL-SCNC: 105 MMOL/L (ref 98–112)
CHOLEST SERPL-MCNC: 183 MG/DL (ref ?–200)
CO2 SERPL-SCNC: 31 MMOL/L (ref 21–32)
COMPLEXED PSA SERPL-MCNC: 0.72 NG/ML (ref ?–4)
CREAT BLD-MCNC: 0.98 MG/DL
EOSINOPHIL # BLD AUTO: 0.19 X10(3) UL (ref 0–0.7)
EOSINOPHIL NFR BLD AUTO: 4 %
ERYTHROCYTE [DISTWIDTH] IN BLOOD BY AUTOMATED COUNT: 12.6 %
FASTING PATIENT LIPID ANSWER: YES
FASTING STATUS PATIENT QL REPORTED: YES
GLOBULIN PLAS-MCNC: 3.4 G/DL (ref 2.8–4.4)
GLUCOSE BLD-MCNC: 93 MG/DL (ref 70–99)
HCT VFR BLD AUTO: 46.2 %
HDLC SERPL-MCNC: 40 MG/DL (ref 40–59)
HGB BLD-MCNC: 16.1 G/DL
IMM GRANULOCYTES # BLD AUTO: 0.02 X10(3) UL (ref 0–1)
IMM GRANULOCYTES NFR BLD: 0.4 %
LDLC SERPL CALC-MCNC: 102 MG/DL (ref ?–100)
LYMPHOCYTES # BLD AUTO: 0.99 X10(3) UL (ref 1–4)
LYMPHOCYTES NFR BLD AUTO: 20.6 %
MCH RBC QN AUTO: 31.4 PG (ref 26–34)
MCHC RBC AUTO-ENTMCNC: 34.8 G/DL (ref 31–37)
MCV RBC AUTO: 90.2 FL
MONOCYTES # BLD AUTO: 0.45 X10(3) UL (ref 0.1–1)
MONOCYTES NFR BLD AUTO: 9.4 %
NEUTROPHILS # BLD AUTO: 3.11 X10 (3) UL (ref 1.5–7.7)
NEUTROPHILS # BLD AUTO: 3.11 X10(3) UL (ref 1.5–7.7)
NEUTROPHILS NFR BLD AUTO: 64.6 %
NONHDLC SERPL-MCNC: 143 MG/DL (ref ?–130)
OSMOLALITY SERPL CALC.SUM OF ELEC: 291 MOSM/KG (ref 275–295)
PLATELET # BLD AUTO: 150 10(3)UL (ref 150–450)
POTASSIUM SERPL-SCNC: 4.3 MMOL/L (ref 3.5–5.1)
PROT SERPL-MCNC: 7.6 G/DL (ref 6.4–8.2)
RBC # BLD AUTO: 5.12 X10(6)UL
SODIUM SERPL-SCNC: 141 MMOL/L (ref 136–145)
TRIGL SERPL-MCNC: 237 MG/DL (ref 30–149)
TSI SER-ACNC: 1.64 MIU/ML (ref 0.36–3.74)
VLDLC SERPL CALC-MCNC: 40 MG/DL (ref 0–30)
WBC # BLD AUTO: 4.8 X10(3) UL (ref 4–11)

## 2022-03-14 PROCEDURE — 84443 ASSAY THYROID STIM HORMONE: CPT

## 2022-03-14 PROCEDURE — 80053 COMPREHEN METABOLIC PANEL: CPT

## 2022-03-14 PROCEDURE — 85025 COMPLETE CBC W/AUTO DIFF WBC: CPT

## 2022-03-14 PROCEDURE — 82248 BILIRUBIN DIRECT: CPT

## 2022-03-14 PROCEDURE — 36415 COLL VENOUS BLD VENIPUNCTURE: CPT

## 2022-03-14 PROCEDURE — 80061 LIPID PANEL: CPT

## 2022-03-15 ENCOUNTER — TELEPHONE (OUTPATIENT)
Dept: PODIATRY CLINIC | Facility: CLINIC | Age: 57
End: 2022-03-15

## 2022-03-16 RX ORDER — TERBINAFINE HYDROCHLORIDE 250 MG/1
250 TABLET ORAL DAILY
Qty: 45 TABLET | Refills: 0 | Status: SHIPPED | OUTPATIENT
Start: 2022-03-16

## 2022-03-16 NOTE — TELEPHONE ENCOUNTER
S/w pt and informed him of liver enzyme results and Dr. Venus Michelle orders. Pharm confirmed. Scheduled 6 wk f/u on 4/28 at [facility].

## 2022-03-16 NOTE — TELEPHONE ENCOUNTER
Pt had nail culture on 9/24/21 and was positive for fungus. Liver function panel ordered. Pt completed labs through pcp office. Please review labs and advise?

## 2022-03-16 NOTE — TELEPHONE ENCOUNTER
Liver function is normal please let the patient know. Please call in the following:    Lamisil 250 mg tablets generic equivalent okay  Dispense 45  No refills  Instructions take 1 tablet daily by mouth  Please let the patient know that he needs to follow-up with us again in 6 weeks to repeat the labs in order the last 6 weeks of therapy.

## 2022-03-21 ENCOUNTER — OFFICE VISIT (OUTPATIENT)
Dept: INTERNAL MEDICINE CLINIC | Facility: CLINIC | Age: 57
End: 2022-03-21
Payer: COMMERCIAL

## 2022-03-21 VITALS
TEMPERATURE: 98 F | OXYGEN SATURATION: 95 % | BODY MASS INDEX: 40.86 KG/M2 | RESPIRATION RATE: 16 BRPM | WEIGHT: 315 LBS | SYSTOLIC BLOOD PRESSURE: 140 MMHG | HEIGHT: 73.75 IN | HEART RATE: 76 BPM | DIASTOLIC BLOOD PRESSURE: 98 MMHG

## 2022-03-21 DIAGNOSIS — E66.01 CLASS 3 SEVERE OBESITY DUE TO EXCESS CALORIES WITHOUT SERIOUS COMORBIDITY WITH BODY MASS INDEX (BMI) OF 40.0 TO 44.9 IN ADULT (HCC): ICD-10-CM

## 2022-03-21 DIAGNOSIS — E78.1 HYPERTRIGLYCERIDEMIA: ICD-10-CM

## 2022-03-21 DIAGNOSIS — I10 ESSENTIAL HYPERTENSION: Primary | ICD-10-CM

## 2022-03-21 PROCEDURE — 3080F DIAST BP >= 90 MM HG: CPT | Performed by: NURSE PRACTITIONER

## 2022-03-21 PROCEDURE — 99214 OFFICE O/P EST MOD 30 MIN: CPT | Performed by: NURSE PRACTITIONER

## 2022-03-21 PROCEDURE — 3008F BODY MASS INDEX DOCD: CPT | Performed by: NURSE PRACTITIONER

## 2022-03-21 PROCEDURE — 3077F SYST BP >= 140 MM HG: CPT | Performed by: NURSE PRACTITIONER

## 2022-03-21 RX ORDER — AMLODIPINE BESYLATE 5 MG/1
5 TABLET ORAL DAILY
Qty: 90 TABLET | Refills: 0 | Status: SHIPPED | OUTPATIENT
Start: 2022-03-21

## 2022-04-22 RX ORDER — LOSARTAN POTASSIUM 100 MG/1
TABLET ORAL
Qty: 90 TABLET | Refills: 0 | Status: SHIPPED | OUTPATIENT
Start: 2022-04-22

## 2022-04-22 RX ORDER — HYDROCHLOROTHIAZIDE 25 MG/1
TABLET ORAL
Qty: 90 TABLET | Refills: 0 | Status: SHIPPED | OUTPATIENT
Start: 2022-04-22

## 2022-04-22 RX ORDER — AMLODIPINE BESYLATE 5 MG/1
TABLET ORAL
Qty: 90 TABLET | Refills: 0 | Status: SHIPPED | OUTPATIENT
Start: 2022-04-22

## 2022-04-29 ENCOUNTER — OFFICE VISIT (OUTPATIENT)
Dept: PODIATRY CLINIC | Facility: CLINIC | Age: 57
End: 2022-04-29
Payer: COMMERCIAL

## 2022-04-29 DIAGNOSIS — B35.1 ONYCHOMYCOSIS: Primary | ICD-10-CM

## 2022-04-29 PROCEDURE — 99213 OFFICE O/P EST LOW 20 MIN: CPT | Performed by: PODIATRIST

## 2022-07-21 DIAGNOSIS — I10 ESSENTIAL HYPERTENSION: ICD-10-CM

## 2022-07-21 RX ORDER — LOSARTAN POTASSIUM 100 MG/1
TABLET ORAL
Qty: 90 TABLET | Refills: 0 | Status: SHIPPED | OUTPATIENT
Start: 2022-07-21

## 2022-07-21 RX ORDER — HYDROCHLOROTHIAZIDE 25 MG/1
TABLET ORAL
Qty: 90 TABLET | Refills: 0 | Status: SHIPPED | OUTPATIENT
Start: 2022-07-21

## 2023-02-16 ENCOUNTER — HOSPITAL ENCOUNTER (OUTPATIENT)
Age: 58
Discharge: HOME | End: 2023-02-16
Payer: COMMERCIAL

## 2023-02-16 VITALS
WEIGHT: 312 LBS | BODY MASS INDEX: 37.99 KG/M2 | RESPIRATION RATE: 20 BRPM | SYSTOLIC BLOOD PRESSURE: 180 MMHG | OXYGEN SATURATION: 97 % | TEMPERATURE: 98 F | HEART RATE: 74 BPM | HEIGHT: 76 IN | DIASTOLIC BLOOD PRESSURE: 97 MMHG

## 2023-02-16 DIAGNOSIS — I87.2 VENOUS STASIS DERMATITIS OF BOTH LOWER EXTREMITIES: ICD-10-CM

## 2023-02-16 DIAGNOSIS — I10 ESSENTIAL HYPERTENSION: ICD-10-CM

## 2023-02-16 DIAGNOSIS — L03.116 CELLULITIS OF LEFT LOWER EXTREMITY: Primary | ICD-10-CM

## 2023-02-16 DIAGNOSIS — I10 PRIMARY HYPERTENSION: ICD-10-CM

## 2023-02-16 PROCEDURE — 99214 OFFICE O/P EST MOD 30 MIN: CPT | Performed by: NURSE PRACTITIONER

## 2023-02-16 RX ORDER — CEPHALEXIN 500 MG/1
500 CAPSULE ORAL 3 TIMES DAILY
Qty: 30 CAPSULE | Refills: 0 | Status: SHIPPED | OUTPATIENT
Start: 2023-02-16 | End: 2023-02-26

## 2023-02-16 RX ORDER — METHYLPREDNISOLONE 4 MG/1
TABLET ORAL
Qty: 1 EACH | Refills: 0 | Status: SHIPPED | OUTPATIENT
Start: 2023-02-16

## 2023-02-16 NOTE — TELEPHONE ENCOUNTER
Last OV relevant to medication: 3/21/22  Last refill date: 4/22/22 #90/refills: 0  When pt was asked to return for OV: 6/21/22  Upcoming appt/reason: No future appointments. Was pt informed of any over due labs: none overdue  Lab Results   Component Value Date    GLU 93 03/14/2022    BUN 12 03/14/2022    BUNCREA 14.7 11/21/2019    CREATSERUM 0.98 03/14/2022    ANIONGAP 5 03/14/2022    GFRNAA 85 03/14/2022    GFRAA 99 03/14/2022    CA 9.5 03/14/2022    OSMOCALC 291 03/14/2022    ALKPHO 88 03/14/2022    AST 22 03/14/2022    ALT 27 03/14/2022    BILT 1.5 03/14/2022    TP 7.6 03/14/2022    ALB 4.2 03/14/2022    GLOBULIN 3.4 03/14/2022     03/14/2022    K 4.3 03/14/2022     03/14/2022    CO2 31.0 03/14/2022     - please call patient to schedule appointment, he is overdue for physical and med check, thanks!

## 2023-02-20 RX ORDER — AMLODIPINE BESYLATE 5 MG/1
5 TABLET ORAL DAILY
Qty: 90 TABLET | Refills: 0 | Status: SHIPPED | OUTPATIENT
Start: 2023-02-20

## 2023-02-20 NOTE — TELEPHONE ENCOUNTER
Future Appointments   Date Time Provider Eladia Armenta   3/7/2023  8:00 AM Delfino Mendez, APRN [facility] [address]

## 2023-03-07 ENCOUNTER — OFFICE VISIT (OUTPATIENT)
Dept: INTERNAL MEDICINE CLINIC | Facility: CLINIC | Age: 58
End: 2023-03-07
Payer: COMMERCIAL

## 2023-03-07 VITALS
SYSTOLIC BLOOD PRESSURE: 158 MMHG | DIASTOLIC BLOOD PRESSURE: 88 MMHG | RESPIRATION RATE: 20 BRPM | HEIGHT: 76 IN | WEIGHT: 312 LBS | HEART RATE: 87 BPM | OXYGEN SATURATION: 97 % | TEMPERATURE: 98 F | BODY MASS INDEX: 37.99 KG/M2

## 2023-03-07 DIAGNOSIS — Z13.220 SCREENING FOR CHOLESTEROL LEVEL: ICD-10-CM

## 2023-03-07 DIAGNOSIS — I10 ESSENTIAL HYPERTENSION: ICD-10-CM

## 2023-03-07 DIAGNOSIS — L03.116 LEFT LEG CELLULITIS: ICD-10-CM

## 2023-03-07 DIAGNOSIS — Z12.5 SCREENING FOR PROSTATE CANCER: ICD-10-CM

## 2023-03-07 DIAGNOSIS — E55.9 VITAMIN D DEFICIENCY: ICD-10-CM

## 2023-03-07 DIAGNOSIS — Z00.00 ENCOUNTER FOR ANNUAL PHYSICAL EXAM: Primary | ICD-10-CM

## 2023-03-07 DIAGNOSIS — G47.33 OSA (OBSTRUCTIVE SLEEP APNEA): ICD-10-CM

## 2023-03-07 DIAGNOSIS — Z13.29 SCREENING FOR THYROID DISORDER: ICD-10-CM

## 2023-03-07 PROCEDURE — 90471 IMMUNIZATION ADMIN: CPT | Performed by: NURSE PRACTITIONER

## 2023-03-07 PROCEDURE — 3077F SYST BP >= 140 MM HG: CPT | Performed by: NURSE PRACTITIONER

## 2023-03-07 PROCEDURE — 99214 OFFICE O/P EST MOD 30 MIN: CPT | Performed by: NURSE PRACTITIONER

## 2023-03-07 PROCEDURE — 90750 HZV VACC RECOMBINANT IM: CPT | Performed by: NURSE PRACTITIONER

## 2023-03-07 PROCEDURE — 3079F DIAST BP 80-89 MM HG: CPT | Performed by: NURSE PRACTITIONER

## 2023-03-07 PROCEDURE — 3008F BODY MASS INDEX DOCD: CPT | Performed by: NURSE PRACTITIONER

## 2023-03-07 PROCEDURE — 99396 PREV VISIT EST AGE 40-64: CPT | Performed by: NURSE PRACTITIONER

## 2023-03-07 RX ORDER — AMLODIPINE BESYLATE 5 MG/1
5 TABLET ORAL DAILY
Qty: 90 TABLET | Refills: 1 | Status: SHIPPED | OUTPATIENT
Start: 2023-03-07

## 2023-03-07 RX ORDER — CEPHALEXIN 500 MG/1
500 CAPSULE ORAL 3 TIMES DAILY
Qty: 30 CAPSULE | Refills: 0 | Status: SHIPPED | OUTPATIENT
Start: 2023-03-07 | End: 2023-03-17

## 2023-03-07 RX ORDER — HYDROCHLOROTHIAZIDE 25 MG/1
25 TABLET ORAL DAILY
Qty: 90 TABLET | Refills: 1 | Status: SHIPPED | OUTPATIENT
Start: 2023-03-07

## 2023-03-07 RX ORDER — LOSARTAN POTASSIUM 100 MG/1
100 TABLET ORAL DAILY
Qty: 90 TABLET | Refills: 1 | Status: SHIPPED | OUTPATIENT
Start: 2023-03-07

## 2023-09-12 ENCOUNTER — TELEPHONE (OUTPATIENT)
Dept: INTERNAL MEDICINE CLINIC | Facility: CLINIC | Age: 58
End: 2023-09-12

## 2023-10-18 ENCOUNTER — LAB ENCOUNTER (OUTPATIENT)
Dept: LAB | Age: 58
End: 2023-10-18
Attending: NURSE PRACTITIONER
Payer: COMMERCIAL

## 2023-10-18 ENCOUNTER — TELEMEDICINE (OUTPATIENT)
Dept: INTERNAL MEDICINE CLINIC | Facility: CLINIC | Age: 58
End: 2023-10-18
Payer: COMMERCIAL

## 2023-10-18 DIAGNOSIS — I10 ESSENTIAL HYPERTENSION: ICD-10-CM

## 2023-10-18 DIAGNOSIS — J06.9 VIRAL URI WITH COUGH: Primary | ICD-10-CM

## 2023-10-18 DIAGNOSIS — J06.9 VIRAL URI WITH COUGH: ICD-10-CM

## 2023-10-18 PROCEDURE — 99214 OFFICE O/P EST MOD 30 MIN: CPT | Performed by: NURSE PRACTITIONER

## 2023-10-18 PROCEDURE — 87635 SARS-COV-2 COVID-19 AMP PRB: CPT

## 2023-10-18 RX ORDER — HYDROCHLOROTHIAZIDE 25 MG/1
25 TABLET ORAL DAILY
Qty: 30 TABLET | Refills: 0 | Status: SHIPPED | OUTPATIENT
Start: 2023-10-18

## 2023-10-18 RX ORDER — LOSARTAN POTASSIUM 100 MG/1
100 TABLET ORAL DAILY
Qty: 30 TABLET | Refills: 0 | Status: SHIPPED | OUTPATIENT
Start: 2023-10-18

## 2023-10-18 RX ORDER — AMLODIPINE BESYLATE 5 MG/1
5 TABLET ORAL DAILY
Qty: 30 TABLET | Refills: 0 | Status: SHIPPED | OUTPATIENT
Start: 2023-10-18

## 2023-10-18 NOTE — PROGRESS NOTES
[facility]    Virtual Video Check-In    Dariana Gonzalez verbally consents to a [employer] visit on 10/18/23. Patient has been referred to the [facility] website at [email or web address] to review the yearly Consent to Treat document. Patient understands and accepts financial responsibility for any deductible, co-insurance and/or co-pays associated with this service. CHIEF COMPLAINT   Patient presents with:  Sinus Problem: Thinks infection-has hx of      HPI:   Dariana Gonzalez is a 60year old male with complaints of URI symptoms that started yesterday. Complaints of sinus headache, nasal congestion, sneezing, cough, fatigue, body aches, and decreased taste. Denies fevers, chills, SOB, CP, loss of smell, nausea, vomiting, or diarrhea. Has not tried anything OTC. No known exposure to covid but does commute by train 2 days/weekly. HTN: Reports his last BP few days ago 138/86. Per patient, BP normally ranges between 130-140/80-90 at home. Taking BP meds without any SE. Denies any headaches or vision changes. Needs refill on his BP meds. Current Outpatient Medications   Medication Sig Dispense Refill    amLODIPine 5 MG Oral Tab Take 1 tablet (5 mg total) by mouth daily. 30 tablet 0    hydroCHLOROthiazide 25 MG Oral Tab Take 1 tablet (25 mg total) by mouth daily. 30 tablet 0    losartan 100 MG Oral Tab Take 1 tablet (100 mg total) by mouth daily. 30 tablet 0    Multiple Vitamin (THERA/BETA-CAROTENE) Oral Tab Take 1 tablet by mouth. terbinafine 250 MG Oral Tab Take 1 tablet (250 mg total) by mouth daily. (Patient not taking: Reported on 10/18/2023) 45 tablet 0      No past medical history on file.    Social History:  Social History     Socioeconomic History    Marital status:    Tobacco Use    Smoking status: Never    Smokeless tobacco: Never   Vaping Use    Vaping Use: Never used   Substance and Sexual Activity    Alcohol use: Yes    Drug use: No   Other Topics Concern Caffeine Concern No    Exercise Yes    Seat Belt Yes    Special Diet No    Stress Concern Yes    Weight Concern No        REVIEW OF SYSTEMS:   See HPI. EXAM:   Limited due to COVID-19  GENERAL: Appears stated age and in no apparent distress. Alert and oriented x3. LUNGS: Patient speaks clearly in full sentences without dyspnea, cough noted while on video visit. PSYCH: Appropriate mood and affect. LABS:      Lab Results   Component Value Date    WBC 4.8 03/14/2022    RBC 5.12 03/14/2022    HGB 16.1 03/14/2022    HCT 46.2 03/14/2022    MCV 90.2 03/14/2022    MCH 31.4 03/14/2022    MCHC 34.8 03/14/2022    RDW 12.6 03/14/2022    .0 03/14/2022      Lab Results   Component Value Date    GLU 93 03/14/2022    BUN 12 03/14/2022    BUNCREA 14.7 11/21/2019    CREATSERUM 0.98 03/14/2022    ANIONGAP 5 03/14/2022    GFRNAA 85 03/14/2022    GFRAA 99 03/14/2022    CA 9.5 03/14/2022    OSMOCALC 291 03/14/2022    ALKPHO 88 03/14/2022    AST 22 03/14/2022    ALT 27 03/14/2022    BILT 1.5 03/14/2022    TP 7.6 03/14/2022    ALB 4.2 03/14/2022    GLOBULIN 3.4 03/14/2022     03/14/2022    K 4.3 03/14/2022     03/14/2022    CO2 31.0 03/14/2022      Lab Results   Component Value Date    CHOLEST 183 03/14/2022    TRIG 237 (H) 03/14/2022    HDL 40 03/14/2022     (H) 03/14/2022    VLDL 40 (H) 03/14/2022    NONHDLC 143 (H) 03/14/2022      Lab Results   Component Value Date    TSH 1.640 03/14/2022      Lab Results   Component Value Date    EAG 94 11/21/2019    A1C 4.9 11/21/2019        IMAGING:     No results found. ASSESSMENT AND PLAN:      1. Viral URI with cough  -Do COVID PCR. If positive, patient qualifies for Paxlovid   -Start OTC Mucinex/Robitussin DM PRN  -Start OTC Flonase spray  -Supportive care discussed  -Go to ER for SOB or CP  -See patient instructions for education  - SARS-CoV-2 by PCR; Future    2.  Essential hypertension  -Overdue for labs and med check  -30 day refills done  -Continue to monitor BP at home and record in log  -Follow up in 2-3 weeks for med check  - amLODIPine 5 MG Oral Tab; Take 1 tablet (5 mg total) by mouth daily. Dispense: 30 tablet; Refill: 0  - hydroCHLOROthiazide 25 MG Oral Tab; Take 1 tablet (25 mg total) by mouth daily. Dispense: 30 tablet; Refill: 0  - losartan 100 MG Oral Tab; Take 1 tablet (100 mg total) by mouth daily. Dispense: 30 tablet; Refill: 0    The patient indicates understanding of these issues and agrees to the plan. Return in about 2 weeks (around 11/1/2023) for med check with Sho or sooner as needed. DHIRAJ Wolff  10/18/2023    Patient understands phone/video evaluation is not a substitute for face-to-face examination or emergency care. Patient advised to go to the ER or call 911 for worsening symptoms or acute distress. The patient was also advised of the potential privacy & security concerns related to the telehealth platform. Lastly, the patient confirmed that they were in [place]. Please note that the following visit was completed using two-way, real-time interactive audio and video communication. This has been done in good bina to provide continuity of care in the best interest of the provider-patient relationship, due to the ongoing public health crisis/national emergency and because of restrictions of visitation. There are limitations of this visit as no physical exam could be performed. Every conscious effort was taken to allow for sufficient and adequate time. This billing was spent on reviewing labs, medications, radiology tests and decision making. Appropriate medical decision-making and tests are ordered as detailed in the plan of care above.

## 2023-10-18 NOTE — PATIENT INSTRUCTIONS
Get your COVID test done. Do warm, salt water gargles 2-3 times daily. You can use Robitussin DM or Mucinex DM as directed on the bottle for cough and chest congestion. Use Cepacol for sore throat and dry cough as needed. Use Tylenol as needed for pain or fever. Stay hydrated. Go to ER or call 911 if worsening symptoms such as persistent fever >103, difficulty breathing, or chest pain. Infection prevention:  - Proper hand hygiene is very important          - Wear a mask in public  - Avoid touching your mouth, nose, eyes, and face  - Practice social distancing and staying 6 ft away from other individuals  - Cough into your arm or a tissue  - Avoid contact with others if you have symptoms of an upper or lower respiratory infection  - Avoid contact with others who are ill  - Avoid direct contact with your pets if you are ill  - Disinfect surfaces with appropriate materials  - If your symptoms become severe (shortness of breath with rest or activity, fever, chest congestion, productive cough), go to the ER  - Recommend self-isolation at home if you are sick, wearing a mask if in room with other family members    CDC Update: Isolation & Quarantine Guidelines for Patients - Updated 12/29/2021  Patients who are healthcare workers, follow your organization policy. If You Test Positive for COVID-19 (Isolate)    Everyone regardless of vaccination status:   Stay home for 5 days  If you have no symptoms or your symptoms are resolving after 5 days, you can leave your house, return to work, travel, etc.  Continue to wear a mask around others for 5 additional days. If you have a fever, continue to stay home until your fever resolves.       If You Were Exposed to Someone with COVID-19 (Quarantine)    If you:               Have been boosted  OR  Completed the primary series of Pfizer or Moderna vaccine within the last 6 months  OR  Completed the primary series of J&J vaccine within the last 2 months    Wear a mask around others for 10 days. Test on day 5, if possible. If you develop symptoms get a test and stay home. If you:  Completed the primary series of Pfizer or Moderna vaccine over 6 months ago and are not boosted  OR  Completed the primary series of J&J over 2 months ago and are not boosted  OR  Are unvaccinated    Stay home for 5 days. After that continue to wear a mask around others for 5 additional days. If you can't quarantine you must wear a mask for 10 days. Test on day 5 if possible. If you develop symptoms get a test and stay home.     cdc.gov/coronavirus    10 THINGS YOU CAN DO TO MANAGE YOUR COVID-19 SYMPTOMS AT HOME    If you have possible or confirmed COVID-19    Stay home except to get medical care. Monitor your symptoms carefully. If your symptoms get worse, call your healthcare provider immediately. Get rest and stay hydrated. If you have a medical appointment, call the healthcare provider ahead of time and tell them you have or may have COVID-19. For medical emergencies, call 911 and notify the dispatch personnel that you have or may have COVID-19. Cover your cough and sneezes with a tissue or use the inside of your elbow. Wash your hands often with soap and water for at least 20 seconds or clean hands with an alcohol-based hand  that contains at least 60% alcohol. As much as possible, stay in a specific room and away from other people in your home. Also, you should use a separate bathroom, if available. If you need to be around other people in or outside of the home, wear a mask. Avoid sharing personal items with other people in your household, like dishes, towels, and bedding. Clean all surfaces that are touched often, like counters, tabletops, and doorknobs. Use household cleaning sprays or wipes according to the label instructions.

## 2023-10-19 ENCOUNTER — TELEPHONE (OUTPATIENT)
Dept: INTERNAL MEDICINE CLINIC | Facility: CLINIC | Age: 58
End: 2023-10-19

## 2023-10-19 LAB — SARS-COV-2 RNA RESP QL NAA+PROBE: NOT DETECTED

## 2023-10-19 NOTE — TELEPHONE ENCOUNTER
His symptoms started 2 days ago. I discussed with him at the [facility] yesterday that is it viral URI and not a sinus infection at this point. He should do supportive care and if symptoms persist beyond 1 week, he should be re-evaluated in office to discuss possible need of antibiotics. 1. Viral URI with cough  -Do COVID PCR.  If positive, patient qualifies for Paxlovid   -Start OTC Mucinex/Robitussin DM PRN  -Start OTC Flonase spray  -Supportive care discussed  -Go to ER for SOB or CP  -See patient instructions for education

## 2023-10-19 NOTE — TELEPHONE ENCOUNTER
Patient has a neg covid test on 10/18/23 and has had symptoms for a while. Sho told him to contact the office after a neg covid test and to be consulted with next steps. He also feels he has a sinus infection.     Please advise patient at: [phone]

## 2023-10-19 NOTE — TELEPHONE ENCOUNTER
Called patient. Patient aware of providers response and rec's. Patient verbalizes understanding. Patient asking if any meds can be prescribed. Reiterated providers response and to follow up in 1 week if symptoms are persisting or go to ER in meantime for any chest pain, sob, persistent fever >103, or any emergent matter. Reviewed patient instructions with patient and providers recs. Verbalizes understanding. No additional questions.

## 2023-10-19 NOTE — TELEPHONE ENCOUNTER
Lm with patient abel Lambert's response to his question below:    it viral URI and not a sinus infection at this point. He should do supportive care and if symptoms persist beyond 1 week, he should be re-evaluated in office to discuss possible need of antibiotics.

## 2023-11-30 DIAGNOSIS — I10 ESSENTIAL HYPERTENSION: ICD-10-CM

## 2023-11-30 NOTE — TELEPHONE ENCOUNTER
Received refill requests from [employer] for losartan, hydrochlorothiazide and amlodipene via fax. PSR left message for patient to schedule a med check.

## 2023-12-05 ENCOUNTER — TELEPHONE (OUTPATIENT)
Dept: INTERNAL MEDICINE CLINIC | Facility: CLINIC | Age: 58
End: 2023-12-05

## 2023-12-05 RX ORDER — LOSARTAN POTASSIUM 100 MG/1
100 TABLET ORAL DAILY
Qty: 30 TABLET | Refills: 0 | Status: SHIPPED | OUTPATIENT
Start: 2023-12-05

## 2023-12-05 RX ORDER — AMLODIPINE BESYLATE 5 MG/1
5 TABLET ORAL DAILY
Qty: 30 TABLET | Refills: 0 | Status: SHIPPED | OUTPATIENT
Start: 2023-12-05

## 2023-12-05 RX ORDER — HYDROCHLOROTHIAZIDE 25 MG/1
25 TABLET ORAL DAILY
Qty: 30 TABLET | Refills: 0 | Status: SHIPPED | OUTPATIENT
Start: 2023-12-05

## 2023-12-05 NOTE — TELEPHONE ENCOUNTER
Patient is coming in 12/7/23 for med check. Would like his second shingles that day.     Please place order

## 2023-12-05 NOTE — TELEPHONE ENCOUNTER
Order already in place.     Future Appointments   Date Time Provider [facility] Kathi   12/7/2023  2:00 PM Ritu Mendez APRN [facility] [address] [place]

## 2023-12-05 NOTE — TELEPHONE ENCOUNTER
Future Appointments   Date Time Provider Eladia Armenta   12/7/2023  2:00 PM Ritu Mendez, DHIRAJ [facility] [address] [place]

## 2023-12-05 NOTE — TELEPHONE ENCOUNTER
Last OV relevant to medication: 37/233  Last refill date: 10/18/23 #30/refills: 0  When pt was asked to return for OV: 6 months  Upcoming appt/reason:   Future Appointments   Date Time Provider Eladia Wellsisti   12/7/2023  2:00 PM Ritu Mendez APRN [facility]   Was pt informed of any over due labs: prior to apt  Lab Results   Component Value Date    GLU 93 03/14/2022    BUN 12 03/14/2022    BUNCREA 14.7 11/21/2019    CREATSERUM 0.98 03/14/2022    ANIONGAP 5 03/14/2022    GFRNAA 85 03/14/2022    GFRAA 99 03/14/2022    CA 9.5 03/14/2022    OSMOCALC 291 03/14/2022    ALKPHO 88 03/14/2022    AST 22 03/14/2022    ALT 27 03/14/2022    BILT 1.5 03/14/2022    TP 7.6 03/14/2022    ALB 4.2 03/14/2022    GLOBULIN 3.4 03/14/2022     03/14/2022    K 4.3 03/14/2022     03/14/2022    CO2 31.0 03/14/2022

## 2023-12-12 ENCOUNTER — TELEPHONE (OUTPATIENT)
Dept: INTERNAL MEDICINE CLINIC | Facility: CLINIC | Age: 58
End: 2023-12-12

## 2023-12-12 NOTE — TELEPHONE ENCOUNTER
losartan 100 MG Oral Tab     hydroCHLOROthiazide 25 MG     amLODIPine 5 MG Oral Tab       [id number] - [place], [place] - [address] [facility] [address] AT [facility] [address] [phone], [phone] [address] [address] Phone: [phone] Fax: [phone]     Pt had to cancel appointment today-insurance issue. Per Phoenix Rosas she will give Eveline Siddiqui 30 day supply.

## 2024-04-28 ENCOUNTER — OFFICE VISIT (OUTPATIENT)
Dept: FAMILY MEDICINE CLINIC | Facility: CLINIC | Age: 59
End: 2024-04-28
Payer: COMMERCIAL

## 2024-04-28 ENCOUNTER — HOSPITAL ENCOUNTER (INPATIENT)
Facility: HOSPITAL | Age: 59
LOS: 1 days | Discharge: HOME | End: 2024-05-02
Attending: EMERGENCY MEDICINE | Admitting: HOSPITALIST
Payer: COMMERCIAL

## 2024-04-28 ENCOUNTER — APPOINTMENT (OUTPATIENT)
Dept: GENERAL RADIOLOGY | Facility: HOSPITAL | Age: 59
End: 2024-04-28
Attending: EMERGENCY MEDICINE
Payer: COMMERCIAL

## 2024-04-28 VITALS
DIASTOLIC BLOOD PRESSURE: 110 MMHG | SYSTOLIC BLOOD PRESSURE: 184 MMHG | RESPIRATION RATE: 20 BRPM | HEIGHT: 76 IN | WEIGHT: 315 LBS | TEMPERATURE: 98 F | OXYGEN SATURATION: 94 % | BODY MASS INDEX: 38.36 KG/M2 | HEART RATE: 61 BPM

## 2024-04-28 DIAGNOSIS — I48.91 ATRIAL FIBRILLATION WITH RAPID VENTRICULAR RESPONSE (HCC): Primary | ICD-10-CM

## 2024-04-28 DIAGNOSIS — R06.02 SOB (SHORTNESS OF BREATH) ON EXERTION: Primary | ICD-10-CM

## 2024-04-28 DIAGNOSIS — I10 UNCONTROLLED HYPERTENSION: ICD-10-CM

## 2024-04-28 DIAGNOSIS — I50.9 ACUTE CONGESTIVE HEART FAILURE, UNSPECIFIED HEART FAILURE TYPE (HCC): ICD-10-CM

## 2024-04-28 DIAGNOSIS — I49.9 IRREGULAR HEARTBEAT: ICD-10-CM

## 2024-04-28 LAB
ALBUMIN SERPL-MCNC: 4 G/DL (ref 3.4–5)
ALBUMIN/GLOB SERPL: 1 {RATIO} (ref 1–2)
ALP LIVER SERPL-CCNC: 96 U/L
ALT SERPL-CCNC: 48 U/L
ANION GAP SERPL CALC-SCNC: 10 MMOL/L (ref 0–18)
APTT PPP: 25.7 SECONDS (ref 23.3–35.6)
AST SERPL-CCNC: 33 U/L (ref 15–37)
BASOPHILS # BLD AUTO: 0.05 X10(3) UL (ref 0–0.2)
BASOPHILS NFR BLD AUTO: 0.7 %
BILIRUB SERPL-MCNC: 2.7 MG/DL (ref 0.1–2)
BUN BLD-MCNC: 10 MG/DL (ref 9–23)
CALCIUM BLD-MCNC: 9.1 MG/DL (ref 8.5–10.1)
CHLORIDE SERPL-SCNC: 104 MMOL/L (ref 98–112)
CHOLEST SERPL-MCNC: 156 MG/DL (ref ?–200)
CO2 SERPL-SCNC: 21 MMOL/L (ref 21–32)
CREAT BLD-MCNC: 1.05 MG/DL
EGFRCR SERPLBLD CKD-EPI 2021: 82 ML/MIN/1.73M2 (ref 60–?)
EOSINOPHIL # BLD AUTO: 0.05 X10(3) UL (ref 0–0.7)
EOSINOPHIL NFR BLD AUTO: 0.7 %
ERYTHROCYTE [DISTWIDTH] IN BLOOD BY AUTOMATED COUNT: 13.7 %
FLUAV + FLUBV RNA SPEC NAA+PROBE: NEGATIVE
FLUAV + FLUBV RNA SPEC NAA+PROBE: NEGATIVE
GLOBULIN PLAS-MCNC: 3.9 G/DL (ref 2.8–4.4)
GLUCOSE BLD-MCNC: 116 MG/DL (ref 70–99)
HCT VFR BLD AUTO: 47.6 %
HDLC SERPL-MCNC: 51 MG/DL (ref 40–59)
HGB BLD-MCNC: 16.4 G/DL
IMM GRANULOCYTES # BLD AUTO: 0.02 X10(3) UL (ref 0–1)
IMM GRANULOCYTES NFR BLD: 0.3 %
INR BLD: 1.13 (ref 0.8–1.2)
LDLC SERPL CALC-MCNC: 84 MG/DL (ref ?–100)
LYMPHOCYTES # BLD AUTO: 0.49 X10(3) UL (ref 1–4)
LYMPHOCYTES NFR BLD AUTO: 6.7 %
MCH RBC QN AUTO: 32.5 PG (ref 26–34)
MCHC RBC AUTO-ENTMCNC: 34.5 G/DL (ref 31–37)
MCV RBC AUTO: 94.3 FL
MONOCYTES # BLD AUTO: 0.76 X10(3) UL (ref 0.1–1)
MONOCYTES NFR BLD AUTO: 10.4 %
NEUTROPHILS # BLD AUTO: 5.95 X10 (3) UL (ref 1.5–7.7)
NEUTROPHILS # BLD AUTO: 5.95 X10(3) UL (ref 1.5–7.7)
NEUTROPHILS NFR BLD AUTO: 81.2 %
NONHDLC SERPL-MCNC: 105 MG/DL (ref ?–130)
NT-PROBNP SERPL-MCNC: 6451 PG/ML (ref ?–125)
OSMOLALITY SERPL CALC.SUM OF ELEC: 280 MOSM/KG (ref 275–295)
PLATELET # BLD AUTO: 173 10(3)UL (ref 150–450)
POTASSIUM SERPL-SCNC: 3.8 MMOL/L (ref 3.5–5.1)
PROT SERPL-MCNC: 7.9 G/DL (ref 6.4–8.2)
PROTHROMBIN TIME: 14.5 SECONDS (ref 11.6–14.8)
Q-T INTERVAL: 388 MS
QRS DURATION: 150 MS
QTC CALCULATION (BEZET): 561 MS
R AXIS: -4 DEGREES
RBC # BLD AUTO: 5.05 X10(6)UL
RSV RNA SPEC NAA+PROBE: NEGATIVE
SARS-COV-2 RNA RESP QL NAA+PROBE: NOT DETECTED
SODIUM SERPL-SCNC: 135 MMOL/L (ref 136–145)
T AXIS: 138 DEGREES
TRIGL SERPL-MCNC: 115 MG/DL (ref 30–149)
TROPONIN I SERPL HS-MCNC: 21 NG/L
TSI SER-ACNC: 1.4 MIU/ML (ref 0.36–3.74)
VENTRICULAR RATE: 126 BPM
VLDLC SERPL CALC-MCNC: 18 MG/DL (ref 0–30)
WBC # BLD AUTO: 7.3 X10(3) UL (ref 4–11)

## 2024-04-28 PROCEDURE — 3008F BODY MASS INDEX DOCD: CPT | Performed by: NURSE PRACTITIONER

## 2024-04-28 PROCEDURE — 99215 OFFICE O/P EST HI 40 MIN: CPT | Performed by: NURSE PRACTITIONER

## 2024-04-28 PROCEDURE — 3077F SYST BP >= 140 MM HG: CPT | Performed by: NURSE PRACTITIONER

## 2024-04-28 PROCEDURE — 71045 X-RAY EXAM CHEST 1 VIEW: CPT | Performed by: EMERGENCY MEDICINE

## 2024-04-28 PROCEDURE — 5A09357 ASSISTANCE WITH RESPIRATORY VENTILATION, LESS THAN 24 CONSECUTIVE HOURS, CONTINUOUS POSITIVE AIRWAY PRESSURE: ICD-10-PCS | Performed by: INTERNAL MEDICINE

## 2024-04-28 PROCEDURE — 3080F DIAST BP >= 90 MM HG: CPT | Performed by: NURSE PRACTITIONER

## 2024-04-28 PROCEDURE — 99223 1ST HOSP IP/OBS HIGH 75: CPT | Performed by: HOSPITALIST

## 2024-04-28 RX ORDER — ENEMA 19; 7 G/133ML; G/133ML
1 ENEMA RECTAL ONCE AS NEEDED
Status: DISCONTINUED | OUTPATIENT
Start: 2024-04-28 | End: 2024-05-02

## 2024-04-28 RX ORDER — FUROSEMIDE 10 MG/ML
40 INJECTION INTRAMUSCULAR; INTRAVENOUS
Status: DISCONTINUED | OUTPATIENT
Start: 2024-04-28 | End: 2024-04-28

## 2024-04-28 RX ORDER — ONDANSETRON 2 MG/ML
4 INJECTION INTRAMUSCULAR; INTRAVENOUS EVERY 6 HOURS PRN
Status: DISCONTINUED | OUTPATIENT
Start: 2024-04-28 | End: 2024-05-02

## 2024-04-28 RX ORDER — LOSARTAN POTASSIUM 50 MG/1
50 TABLET ORAL ONCE
Status: COMPLETED | OUTPATIENT
Start: 2024-04-28 | End: 2024-04-28

## 2024-04-28 RX ORDER — ECHINACEA PURPUREA EXTRACT 125 MG
1 TABLET ORAL
Status: DISCONTINUED | OUTPATIENT
Start: 2024-04-28 | End: 2024-05-02

## 2024-04-28 RX ORDER — BISACODYL 10 MG
10 SUPPOSITORY, RECTAL RECTAL
Status: DISCONTINUED | OUTPATIENT
Start: 2024-04-28 | End: 2024-05-02

## 2024-04-28 RX ORDER — POLYETHYLENE GLYCOL 3350 17 G/17G
17 POWDER, FOR SOLUTION ORAL DAILY PRN
Status: DISCONTINUED | OUTPATIENT
Start: 2024-04-28 | End: 2024-05-02

## 2024-04-28 RX ORDER — HEPARIN SODIUM AND DEXTROSE 10000; 5 [USP'U]/100ML; G/100ML
1000 INJECTION INTRAVENOUS ONCE
Status: COMPLETED | OUTPATIENT
Start: 2024-04-28 | End: 2024-04-28

## 2024-04-28 RX ORDER — FUROSEMIDE 10 MG/ML
40 INJECTION INTRAMUSCULAR; INTRAVENOUS ONCE
Status: COMPLETED | OUTPATIENT
Start: 2024-04-28 | End: 2024-04-28

## 2024-04-28 RX ORDER — PROCHLORPERAZINE EDISYLATE 5 MG/ML
5 INJECTION INTRAMUSCULAR; INTRAVENOUS EVERY 8 HOURS PRN
Status: DISCONTINUED | OUTPATIENT
Start: 2024-04-28 | End: 2024-05-02

## 2024-04-28 RX ORDER — METOPROLOL SUCCINATE 50 MG/1
50 TABLET, EXTENDED RELEASE ORAL
Status: DISCONTINUED | OUTPATIENT
Start: 2024-04-29 | End: 2024-04-29

## 2024-04-28 RX ORDER — AMLODIPINE BESYLATE 2.5 MG/1
2.5 TABLET ORAL EVERY 6 HOURS PRN
Status: DISCONTINUED | OUTPATIENT
Start: 2024-04-28 | End: 2024-05-01

## 2024-04-28 RX ORDER — FUROSEMIDE 10 MG/ML
40 INJECTION INTRAMUSCULAR; INTRAVENOUS
Status: DISCONTINUED | OUTPATIENT
Start: 2024-04-28 | End: 2024-05-01

## 2024-04-28 RX ORDER — METOPROLOL SUCCINATE 25 MG/1
25 TABLET, EXTENDED RELEASE ORAL
Status: DISCONTINUED | OUTPATIENT
Start: 2024-04-28 | End: 2024-04-28

## 2024-04-28 RX ORDER — HEPARIN SODIUM 1000 [USP'U]/ML
5000 INJECTION, SOLUTION INTRAVENOUS; SUBCUTANEOUS ONCE
Status: COMPLETED | OUTPATIENT
Start: 2024-04-28 | End: 2024-04-28

## 2024-04-28 RX ORDER — HEPARIN SODIUM AND DEXTROSE 10000; 5 [USP'U]/100ML; G/100ML
INJECTION INTRAVENOUS CONTINUOUS
Status: DISCONTINUED | OUTPATIENT
Start: 2024-04-28 | End: 2024-05-01

## 2024-04-28 RX ORDER — POTASSIUM CHLORIDE 20 MEQ/1
40 TABLET, EXTENDED RELEASE ORAL ONCE
Status: COMPLETED | OUTPATIENT
Start: 2024-04-28 | End: 2024-04-28

## 2024-04-28 RX ORDER — HYDROXYZINE HYDROCHLORIDE 10 MG/1
10 TABLET, FILM COATED ORAL 3 TIMES DAILY PRN
Status: DISCONTINUED | OUTPATIENT
Start: 2024-04-28 | End: 2024-05-02

## 2024-04-28 RX ORDER — LABETALOL HYDROCHLORIDE 5 MG/ML
10 INJECTION, SOLUTION INTRAVENOUS EVERY 4 HOURS PRN
Status: DISCONTINUED | OUTPATIENT
Start: 2024-04-28 | End: 2024-05-01

## 2024-04-28 RX ORDER — METOPROLOL SUCCINATE 50 MG/1
50 TABLET, EXTENDED RELEASE ORAL
Status: CANCELLED | OUTPATIENT
Start: 2024-04-28

## 2024-04-28 RX ORDER — BENZONATATE 100 MG/1
200 CAPSULE ORAL 3 TIMES DAILY PRN
Status: DISCONTINUED | OUTPATIENT
Start: 2024-04-28 | End: 2024-05-02

## 2024-04-28 RX ORDER — CALCIUM CARBONATE 500 MG/1
500 TABLET, CHEWABLE ORAL 3 TIMES DAILY PRN
Status: DISCONTINUED | OUTPATIENT
Start: 2024-04-28 | End: 2024-05-02

## 2024-04-28 RX ORDER — ACETAMINOPHEN 500 MG
1000 TABLET ORAL EVERY 4 HOURS PRN
Status: DISCONTINUED | OUTPATIENT
Start: 2024-04-28 | End: 2024-05-02

## 2024-04-28 RX ORDER — DIPHENHYDRAMINE HYDROCHLORIDE 50 MG/ML
12.5 INJECTION INTRAMUSCULAR; INTRAVENOUS EVERY 6 HOURS PRN
Status: DISCONTINUED | OUTPATIENT
Start: 2024-04-28 | End: 2024-05-02

## 2024-04-28 RX ORDER — SIMETHICONE 80 MG
80 TABLET,CHEWABLE ORAL 4 TIMES DAILY PRN
Status: DISCONTINUED | OUTPATIENT
Start: 2024-04-28 | End: 2024-05-02

## 2024-04-28 RX ORDER — MELATONIN
3 NIGHTLY PRN
Status: DISCONTINUED | OUTPATIENT
Start: 2024-04-28 | End: 2024-05-02

## 2024-04-28 RX ORDER — SENNOSIDES 8.6 MG
17.2 TABLET ORAL NIGHTLY PRN
Status: DISCONTINUED | OUTPATIENT
Start: 2024-04-28 | End: 2024-05-02

## 2024-04-28 NOTE — PROGRESS NOTES
CHIEF COMPLAINT:     Chief Complaint   Patient presents with    Shortness Of Breath     Sob x3days         HPI:   Mitesh Flood is a 58 year old male who presents for dyspnea on exertion for  2 days. Patient noticed that he had to stop multiple times from parking spot to movie theatre to catch his breath the other night.  Or needing to stop when walking across the room at home or going up the stairs.  Associated symptoms:  fatigue only when active, diaphoretic, decreased appetite, increased thirst, bloating or gassy sensation, leg swelling if he does not get a full 8 hours of sleep.     Denies fever, chills, chest pain, abdominal pain, dizziness, weakness, syncope, palpitation, or unusual wt loss/gain.        BMI: Body mass index is 38.83 kg/m².  LENARD - full mask cpap  HTN = Aware his blood pressure has been high but that is due to not taking any blood pressure meds since he was laid off and had no insurance.  However, recently got new insurance and has an appointment with PCP for annual in 2 weeks.      Current Outpatient Medications   Medication Sig Dispense Refill    losartan 100 MG Oral Tab Take 1 tablet (100 mg total) by mouth daily. (Patient not taking: Reported on 4/28/2024) 30 tablet 0    hydroCHLOROthiazide 25 MG Oral Tab Take 1 tablet (25 mg total) by mouth daily. (Patient not taking: Reported on 4/28/2024) 30 tablet 0    amLODIPine 5 MG Oral Tab Take 1 tablet (5 mg total) by mouth daily. (Patient not taking: Reported on 4/28/2024) 30 tablet 0    terbinafine 250 MG Oral Tab Take 1 tablet (250 mg total) by mouth daily. (Patient not taking: Reported on 10/18/2023) 45 tablet 0    Multiple Vitamin (THERA/BETA-CAROTENE) Oral Tab Take 1 tablet by mouth.        History reviewed. No pertinent past medical history.   Past Surgical History:   Procedure Laterality Date    Wrist fracture surgery           Social History     Socioeconomic History    Marital status:    Tobacco Use    Smoking status: Never     Smokeless tobacco: Never   Vaping Use    Vaping status: Never Used   Substance and Sexual Activity    Alcohol use: Yes    Drug use: No   Other Topics Concern    Caffeine Concern No    Exercise Yes    Seat Belt Yes    Special Diet No    Stress Concern Yes    Weight Concern No         REVIEW OF SYSTEMS:   GENERAL: see HPI  SKIN: no rashes or abnormal skin lesions  HEENT: See HPI  LUNGS: See HPI  CARDIOVASCULAR: denies chest pain or palpitations   GI: denies N/V/C or abdominal pain  NEURO: denies dizziness or lightheadedness    EXAM:   BP (!) 173/131 (BP Location: Left arm, Patient Position: Sitting, Cuff Size: large)   Pulse 61   Temp 98 °F (36.7 °C) (Oral)   Resp 20   Ht 6' 4\" (1.93 m)   Wt (!) 319 lb (144.7 kg)   SpO2 94%   BMI 38.83 kg/m²     Physical Exam  Vitals reviewed.   Constitutional:       Appearance: He is obese. He is diaphoretic.   HENT:      Head: Normocephalic and atraumatic.      Right Ear: Hearing, tympanic membrane and ear canal normal.      Left Ear: Hearing, tympanic membrane and ear canal normal.      Nose: Nose normal.      Mouth/Throat:      Lips: Pink.      Mouth: Mucous membranes are moist.      Pharynx: Oropharynx is clear.   Eyes:      General: Lids are normal.      Extraocular Movements: Extraocular movements intact.      Conjunctiva/sclera: Conjunctivae normal.   Cardiovascular:      Rate and Rhythm: Rhythm irregular.      Pulses:           Radial pulses are 2+ on the right side and 2+ on the left side.      Heart sounds: Normal heart sounds.   Pulmonary:      Effort: Pulmonary effort is normal.      Breath sounds: Normal breath sounds and air entry. No decreased breath sounds, wheezing, rhonchi or rales.   Abdominal:      General: Abdomen is protuberant. Bowel sounds are normal. There is no abdominal bruit.      Palpations: Abdomen is soft. There is no hepatomegaly, splenomegaly or pulsatile mass.      Tenderness: There is no abdominal tenderness.   Musculoskeletal:       Cervical back: Normal range of motion and neck supple.      Right lower le+ Edema present.      Left lower le+ Edema present.   Lymphadenopathy:      Cervical: No cervical adenopathy.   Skin:     General: Skin is warm and moist.   Neurological:      Mental Status: He is alert and oriented to person, place, and time.   Psychiatric:         Speech: Speech normal.         Behavior: Behavior is cooperative.          No results found for this or any previous visit (from the past 24 hour(s)).    ASSESSMENT AND PLAN:   Mitesh Flood is a 58 year old male who presents with     ASSESSMENT:   Encounter Diagnoses   Name Primary?    SOB (shortness of breath) on exertion Yes    Irregular heartbeat     Uncontrolled hypertension        PLAN:   Limitations of the [facility] explained to patient regarding ability to perform radiological imaging, EKG testing, laboratory testing, and IVF/medication administration. Patient is advised to go to IC/ER for further evaluation and treatment.     Site recommendation:  ER    Accompanied by: Wife    Patient verbalized understanding of rationale for further evaluation and was stable upon discharge. Patient declined EMS.  Wife driving patient in personal vehicle.    BP (!) 184/110   Pulse 61   Temp 98 °F (36.7 °C) (Oral)   Resp 20   Ht 6' 4\" (1.93 m)   Wt (!) 319 lb (144.7 kg)   SpO2 94%   BMI 38.83 kg/m²     There are no Patient Instructions on file for this visit.

## 2024-04-28 NOTE — ED QUICK NOTES
Orders for admission, patient is aware of plan and ready to go upstairs. Any questions, please call ED RN nolan at extension [phone].     Patient Covid vaccination status: Fully vaccinated     COVID Test Ordered in ED: SARS-CoV-2/Flu A and B/RSV by PCR (GeneXpert)    COVID Suspicion at Admission: N/A    Running Infusions:    dilTIAZem 5 mg/hr (04/28/24 4041)        Mental Status/LOC at time of transport: a&ox3    Other pertinent information:   CIWA score: N/A   NIH score:  N/A

## 2024-04-28 NOTE — H&P
[facility]     Mitesh Flood Patient Status:  Inpatient    1965 MRN [id number]   Location [facility] 8NE-A Attending Du Kahn MD   Hosp Day # 0 PCP Shara Vance MD     Chief Complaint:   Chief Complaint   Patient presents with    Difficulty Breathing       Subjective:    History of Present Illness:     Mitesh Flood is a 58 year old male with a past medical history of hypertension.  He is noncompliant with his medications.  He states that he occasionally has palpitations.  He comes the emergency room now secondary to shortness of breath which has been ongoing the past 2 days.  In the emergency room he was noted to have rapid atrial fibrillation and elevated blood pressure.  He was started on Cardizem drip and heparin drip.    History/Other:    Past Medical History:  Past Medical History:    Essential hypertension     Past Surgical History:   Past Surgical History:   Procedure Laterality Date    Wrist fracture surgery        Family History:   Family History   Problem Relation Age of Onset    Heart Disorder Father     Dementia Father     Skin cancer Sister     Hypertension Sister     Hypertension Brother     Other (Arn Cancer) Brother     No Known Problems Brother     Blindness Brother     Hypertension Brother      Social History:    reports that he has never smoked. He has never used smokeless tobacco. He reports current alcohol use. He reports that he does not use drugs.     Allergies:   Allergies   Allergen Reactions    Lisinopril Coughing       Medications:    No current facility-administered medications on file prior to encounter.     Current Outpatient Medications on File Prior to Encounter   Medication Sig Dispense Refill    losartan 100 MG Oral Tab Take 1 tablet (100 mg total) by mouth daily. (Patient not taking: Reported on 2024) 30 tablet 0    hydroCHLOROthiazide 25 MG Oral Tab Take 1 tablet (25 mg total) by mouth daily. (Patient not  taking: Reported on 4/28/2024) 30 tablet 0    amLODIPine 5 MG Oral Tab Take 1 tablet (5 mg total) by mouth daily. (Patient not taking: Reported on 4/28/2024) 30 tablet 0    terbinafine 250 MG Oral Tab Take 1 tablet (250 mg total) by mouth daily. (Patient not taking: Reported on 10/18/2023) 45 tablet 0    Multiple Vitamin (THERA/BETA-CAROTENE) Oral Tab Take 1 tablet by mouth.         Review of Systems:   A comprehensive review of systems was completed.    Pertinent positives and negatives noted in the HPI.    Objective:   Physical Exam:    BP (!) 186/111   Pulse (!) 122   Temp 97.8 °F (36.6 °C) (Temporal)   Resp 16   Ht 6' 4\" (1.93 m)   Wt (!) 319 lb (144.7 kg)   SpO2 97%   BMI 38.83 kg/m²   General: No acute distress, Alert  Respiratory: No rhonchi, no wheezes  Cardiovascular: S1, S2. IR IR, tachy  Abdomen: Soft, Non-tender, Non-distended, Positive bowel sounds  Neuro: No new focal deficits  Extremities: No edema      Results:    Labs:      Labs Last 24 Hours:  Recent Labs   Lab 04/28/24  1452   WBC 7.3   HGB 16.4   MCV 94.3   .0   INR 1.13       Recent Labs   Lab 04/28/24  1452   *   BUN 10   CREATSERUM 1.05   CA 9.1   ALB 4.0   *   K 3.8      CO2 21.0   ALKPHO 96   AST 33   ALT 48   BILT 2.7*   TP 7.9       Estimated Creatinine Clearance: 93 mL/min (based on SCr of 1.05 mg/dL).    Recent Labs   Lab 04/28/24  1452   TROPHS 21       Recent Labs   Lab 04/28/24  1452   PTP 14.5   INR 1.13       No results for input(s): \"TROP\", \"CK\" in the last 168 hours.      Imaging: Imaging data reviewed in Epic.    Assessment & Plan:      #Rapid a. Fib  Echo  Tsh  Cardizem drip, wean as able  Heparin drip, transition to OAC soon  Start toprol 50 BID    #? HF  Echo  Could be pulm edema from rapid a. Fib  Diurese    #HTN  Toprol  Will concentrate on rate control and start ARB when needed    #morbid obesity Body mass index is 39.96 kg/m².            Plan of care discussed with ED physician    Du  MD Femi    Supplementary Documentation:     The 21st Century Cures Act makes medical notes like these available to patients in the interest of transparency. Please be advised this is a medical document. Medical documents are intended to carry relevant information, facts as evident, and the clinical opinion of the practitioner. The medical note is intended as peer to peer communication and may appear blunt or direct. It is written in medical language and may contain abbreviations or verbiage that are unfamiliar.

## 2024-04-28 NOTE — ED PROVIDER NOTES
Patient Seen in: [facility] Emergency Department      History     Chief Complaint   Patient presents with    Difficulty Breathing     Stated Complaint: SOB since Friday    Subjective:   HPI    58-year-old male presents for evaluation of shortness of breath.  Patient has had shortness of breath with exertion for the past 2 days.  No chest pain.  Occasionally feels his heart racing.  All symptoms usually get better with rest.  No history of heart or lung disease.  No swelling of his legs.  No history of arrhythmia.    Objective:   Past Medical History:    Essential hypertension              Past Surgical History:   Procedure Laterality Date    Wrist fracture surgery                  Social History     Socioeconomic History    Marital status:    Tobacco Use    Smoking status: Never    Smokeless tobacco: Never   Vaping Use    Vaping status: Never Used   Substance and Sexual Activity    Alcohol use: Yes    Drug use: No   Other Topics Concern    Caffeine Concern No    Exercise Yes    Seat Belt Yes    Special Diet No    Stress Concern Yes    Weight Concern No              Review of Systems    Positive for stated complaint: SOB since Friday  Other systems are as noted in HPI.  Constitutional and vital signs reviewed.      All other systems reviewed and negative except as noted above.    Physical Exam     ED Triage Vitals [04/28/24 1432]   BP (!) 183/129   Pulse (!) 126   Resp 19   Temp 97.8 °F (36.6 °C)   Temp src Temporal   SpO2 94 %   O2 Device None (Room air)       Current:BP (!) 176/114   Pulse 112   Temp 97.8 °F (36.6 °C) (Temporal)   Resp 18   Ht 193 cm (6' 4\")   Wt (!) 144.7 kg   SpO2 96%   BMI 38.83 kg/m²         Physical Exam    General: Alert, oriented, no apparent distress  HEENT: Atraumatic, normocephalic.  Pupils equal reactive.  Extraocular motions intact.   Neck: Supple  Lungs: Clear to auscultation bilaterally.  Heart: Irregularly irregular  Abdomen: Soft, nontender.   Skin: No rash.  No  edema.  Neurologic: No focal neurologic deficits.  Normal speech pattern  Musculoskeletal: No tenderness or deformity noted.  Full range of motion throughout.        ED Course     Labs Reviewed   COMP METABOLIC PANEL (14) - Abnormal; Notable for the following components:       Result Value    Glucose 116 (*)     Sodium 135 (*)     Bilirubin, Total 2.7 (*)     All other components within normal limits   PRO BETA NATRIURETIC PEPTIDE - Abnormal; Notable for the following components:    Pro-Beta Natriuretic Peptide 6,451 (*)     All other components within normal limits   CBC W/ DIFFERENTIAL - Abnormal; Notable for the following components:    Lymphocyte Absolute 0.49 (*)     All other components within normal limits   TROPONIN I HIGH SENSITIVITY - Normal   PROTHROMBIN TIME (PT) - Normal   PTT, ACTIVATED - Normal   SARS-COV-2/FLU A AND B/RSV BY PCR (GENEXPERT) - Normal    Narrative:     This test is intended for the qualitative detection and differentiation of SARS-CoV-2, influenza A, influenza B, and respiratory syncytial virus (RSV) viral RNA in nasopharyngeal or nares swabs from individuals suspected of respiratory viral infection consistent with COVID-19 by their healthcare provider. Signs and symptoms of respiratory viral infection due to SARS-CoV-2, influenza, and RSV can be similar.    Test performed using the Xpert Xpress SARS-CoV-2/FLU/RSV (real time RT-PCR)  assay on the GeneXpert instrument, [employer], [place] [address].   This test is being used under the Food and Drug Administration's Emergency Use Authorization.    The authorized Fact Sheet for Healthcare Providers for this assay is available upon request from the laboratory.   CBC WITH DIFFERENTIAL WITH PLATELET    Narrative:     The following orders were created for panel order CBC With Differential With Platelet.  Procedure                               Abnormality         Status                     ---------                               -----------          ------                     CBC W/ DIFFERENTIAL[[id number]]          Abnormal            Final result                 Please view results for these tests on the individual orders.     EKG    Rate, intervals and axes as noted on EKG Report.  Rate: 126  Rhythm: Atrial Fibrillation  Reading: Left bundle branch block pattern is new versus 28                          MDM      Differential diagnosis includes arrhythmia, pulmonary edema, ACS, metabolic  Admission disposition: 4/28/2024  4:51 PM           Medications   dilTIAZem 10 mg BOLUS FROM BAG infusion (has no administration in time range)   dilTIAZem (cardIZEM) 100 mg in sodium chloride 0.9% 100 mL IVPB-ADDV (5 mg/hr Intravenous New Bag 4/28/24 1459)   heparin (Porcine) 1000 UNIT/ML injection - BOLUS IV 5,000 Units (has no administration in time range)   heparin (Porcine) 10106 units/250mL infusion ED INITIAL DOSE (has no administration in time range)   heparin (Porcine) 34872 units/250mL infusion ACS/AFIB CONTINUOUS (has no administration in time range)   furosemide (Lasix) 10 mg/mL injection 40 mg (40 mg Intravenous Given 4/28/24 1544)   losartan (Cozaar) tab 50 mg (50 mg Oral Given 4/28/24 1633)       Chemistry unremarkable, normal electrolytes.    BNP elevated.    Troponin normal      I have independently visualized the radiology images, my focus/limited interpretation: Pulmonary edema, no focal consolidation    Defer to radiologist for other/incidental findings       Discussed with Wilbert LAW.  He would also add half dose of losartan for blood pressure control.  Continue to titrate diltiazem     A total of 45 minutes of critical care time (exclusive of billable procedures) was administered to manage the patient's unstable vital signs due to his atrial fibrillation with rapid ventricular response.  This involved direct patient intervention, complex decision making, and/or extensive discussions with the patient, family, and clinical staff.           Medical  Decision Making      Disposition and Plan     Clinical Impression:  1. Atrial fibrillation with rapid ventricular response (HCC)    2. Acute congestive heart failure, unspecified heart failure type (HCC)         Disposition:  Admit  4/28/2024  4:51 pm    Follow-up:  No follow-up provider specified.        Medications Prescribed:  Current Discharge Medication List                            Hospital Problems       Present on Admission  Date Reviewed: 4/28/2024            ICD-10-CM Noted POA    * (Principal) Atrial fibrillation with rapid ventricular response (HCC) I48.91 4/28/2024 Unknown

## 2024-04-28 NOTE — ED INITIAL ASSESSMENT (HPI)
Shortness of breath and palpitations since Friday . Seen in immediate care today sent here for further evaluation . Denies chest pain .  Patient is ambulatory increased shortness of breath while walking few steps

## 2024-04-28 NOTE — PLAN OF CARE
Received patient at 1750 from ED. Alert and Oriented x4. Tele Rhythm Afib with BBB. Rate 90-110s, on diltiazem drip at 10. Also on heparin drip. BP elevated. PRN amlodipine given and will continue to monitor. IV lasix given in ED, pt voiding w/o problem.  O2 saturation 96% On room air. Breath sounds clear. Echo ordered. Pt oriented to room and unit. Reviewed plan of care and patient verbalizes understanding.

## 2024-04-29 ENCOUNTER — APPOINTMENT (OUTPATIENT)
Dept: CV DIAGNOSTICS | Facility: HOSPITAL | Age: 59
End: 2024-04-29
Attending: HOSPITALIST
Payer: COMMERCIAL

## 2024-04-29 LAB
ANION GAP SERPL CALC-SCNC: 7 MMOL/L (ref 0–18)
APTT PPP: 29 SECONDS (ref 23.3–35.6)
APTT PPP: 29.3 SECONDS (ref 23.3–35.6)
APTT PPP: 29.7 SECONDS (ref 23.3–35.6)
APTT PPP: 32.4 SECONDS (ref 23.3–35.6)
BASOPHILS # BLD AUTO: 0.05 X10(3) UL (ref 0–0.2)
BASOPHILS NFR BLD AUTO: 1 %
BUN BLD-MCNC: 12 MG/DL (ref 9–23)
CALCIUM BLD-MCNC: 8.5 MG/DL (ref 8.5–10.1)
CHLORIDE SERPL-SCNC: 107 MMOL/L (ref 98–112)
CO2 SERPL-SCNC: 25 MMOL/L (ref 21–32)
CREAT BLD-MCNC: 1.04 MG/DL
EGFRCR SERPLBLD CKD-EPI 2021: 83 ML/MIN/1.73M2 (ref 60–?)
EOSINOPHIL # BLD AUTO: 0.11 X10(3) UL (ref 0–0.7)
EOSINOPHIL NFR BLD AUTO: 2.2 %
ERYTHROCYTE [DISTWIDTH] IN BLOOD BY AUTOMATED COUNT: 13.7 %
GLUCOSE BLD-MCNC: 96 MG/DL (ref 70–99)
HCT VFR BLD AUTO: 41.7 %
HGB BLD-MCNC: 15.1 G/DL
IMM GRANULOCYTES # BLD AUTO: 0.02 X10(3) UL (ref 0–1)
IMM GRANULOCYTES NFR BLD: 0.4 %
LYMPHOCYTES # BLD AUTO: 0.88 X10(3) UL (ref 1–4)
LYMPHOCYTES NFR BLD AUTO: 17.5 %
MAGNESIUM SERPL-MCNC: 2.2 MG/DL (ref 1.6–2.6)
MCH RBC QN AUTO: 33.3 PG (ref 26–34)
MCHC RBC AUTO-ENTMCNC: 36.2 G/DL (ref 31–37)
MCV RBC AUTO: 91.9 FL
MONOCYTES # BLD AUTO: 0.52 X10(3) UL (ref 0.1–1)
MONOCYTES NFR BLD AUTO: 10.4 %
NEUTROPHILS # BLD AUTO: 3.44 X10 (3) UL (ref 1.5–7.7)
NEUTROPHILS # BLD AUTO: 3.44 X10(3) UL (ref 1.5–7.7)
NEUTROPHILS NFR BLD AUTO: 68.5 %
OSMOLALITY SERPL CALC.SUM OF ELEC: 288 MOSM/KG (ref 275–295)
PLATELET # BLD AUTO: 146 10(3)UL (ref 150–450)
POTASSIUM SERPL-SCNC: 3.4 MMOL/L (ref 3.5–5.1)
POTASSIUM SERPL-SCNC: 3.4 MMOL/L (ref 3.5–5.1)
POTASSIUM SERPL-SCNC: 3.7 MMOL/L (ref 3.5–5.1)
RBC # BLD AUTO: 4.54 X10(6)UL
SODIUM SERPL-SCNC: 139 MMOL/L (ref 136–145)
WBC # BLD AUTO: 5 X10(3) UL (ref 4–11)

## 2024-04-29 PROCEDURE — 99233 SBSQ HOSP IP/OBS HIGH 50: CPT | Performed by: HOSPITALIST

## 2024-04-29 PROCEDURE — 93306 TTE W/DOPPLER COMPLETE: CPT | Performed by: HOSPITALIST

## 2024-04-29 RX ORDER — POTASSIUM CHLORIDE 20 MEQ/1
40 TABLET, EXTENDED RELEASE ORAL ONCE
Status: COMPLETED | OUTPATIENT
Start: 2024-04-29 | End: 2024-04-29

## 2024-04-29 RX ORDER — DIGOXIN 0.25 MG/ML
250 INJECTION INTRAMUSCULAR; INTRAVENOUS ONCE
Status: COMPLETED | OUTPATIENT
Start: 2024-04-29 | End: 2024-04-29

## 2024-04-29 RX ORDER — LOSARTAN POTASSIUM 100 MG/1
100 TABLET ORAL DAILY
Status: DISCONTINUED | OUTPATIENT
Start: 2024-04-29 | End: 2024-04-30

## 2024-04-29 RX ORDER — HEPARIN SODIUM 1000 [USP'U]/ML
3000 INJECTION, SOLUTION INTRAVENOUS; SUBCUTANEOUS ONCE
Status: COMPLETED | OUTPATIENT
Start: 2024-04-29 | End: 2024-04-29

## 2024-04-29 RX ORDER — METOPROLOL SUCCINATE 100 MG/1
100 TABLET, EXTENDED RELEASE ORAL
Status: DISCONTINUED | OUTPATIENT
Start: 2024-04-29 | End: 2024-05-02

## 2024-04-29 RX ORDER — AMLODIPINE BESYLATE 5 MG/1
5 TABLET ORAL DAILY
Status: DISCONTINUED | OUTPATIENT
Start: 2024-04-30 | End: 2024-05-01

## 2024-04-29 RX ORDER — POTASSIUM CHLORIDE 20 MEQ/1
40 TABLET, EXTENDED RELEASE ORAL EVERY 4 HOURS
Status: COMPLETED | OUTPATIENT
Start: 2024-04-29 | End: 2024-04-29

## 2024-04-29 RX ORDER — AMLODIPINE BESYLATE 5 MG/1
5 TABLET ORAL ONCE
Status: COMPLETED | OUTPATIENT
Start: 2024-04-29 | End: 2024-04-29

## 2024-04-29 RX ORDER — SPIRONOLACTONE 25 MG/1
25 TABLET ORAL DAILY
Status: DISCONTINUED | OUTPATIENT
Start: 2024-04-29 | End: 2024-05-02

## 2024-04-29 NOTE — PLAN OF CARE
Patient is A&Ox4. BP continues to be elevated. HR fluctuating between 80-110s.  HR can get up as high as 130s nonsustaining with ambulation.  IV digoxin given x1. PO Metoprolol dose increased.   Diltiazem drip stopped for now. DHIRAJ Centeno notified.  On RA, CPAP at [facility]. . Tele- Afib with BBB.   Continues to have shortness of breath with activity.  Breathing unlabored at rest. Denies any lightheadedness, chest pain or palpitations.   BLE nonpitting edema noted. Voiding. Ambulating in the room with set-up assistance.   Fall risk, appropriate safety precautions in place.  Cardizem gtt infusing. K replaced per protocol. Diuresing- IV lasix given.  Heparin gtt infusing, dose adjusted per resulted aPTT levels today.  Echo completed. Patient updated on plan of care.   Questions and concerns addressed.       Addendum 1730:   BPs persistently elevated throughout this shift.  HR continues to be in 90-110s. Still as high as 120-130s with ambulation.  Cardiology APRN notified. Cardizem drip restarted.   Hospitalist updated about subtherapeutic aPTT levels and   needing to increase heparin gtt rate 3x this shift.   No new orders at this time.      Problem: Patient/Family Goals  Goal: Patient/Family Long Term Goal  Description: Patient's Long Term Goal: to go home    Interventions:  - labs, tele monitoring, echo, BP control, increase activity, HR control    - See additional Care Plan goals for specific interventions  Outcome: Progressing  Goal: Patient/Family Short Term Goal  Description: Patient's Short Term Goal: feel better    Interventions:   - - labs, tele monitoring, echo, BP control, increase activity, HR control    - See additional Care Plan goals for specific interventions  Outcome: Progressing     Problem: CARDIOVASCULAR - ADULT  Goal: Maintains optimal cardiac output and hemodynamic stability  Description: INTERVENTIONS:  - Monitor vital signs, rhythm, and trends  - Monitor for bleeding, hypotension and  signs of decreased cardiac output  - Evaluate effectiveness of vasoactive medications to optimize hemodynamic stability  - Monitor arterial and/or venous puncture sites for bleeding and/or hematoma  - Assess quality of pulses, skin color and temperature  - Assess for signs of decreased coronary artery perfusion - ex. Angina  - Evaluate fluid balance, assess for edema, trend weights  Outcome: Progressing  Goal: Absence of cardiac arrhythmias or at baseline  Description: INTERVENTIONS:  - Continuous cardiac monitoring, monitor vital signs, obtain 12 lead EKG if indicated  - Evaluate effectiveness of antiarrhythmic and heart rate control medications as ordered  - Initiate emergency measures for life threatening arrhythmias  - Monitor electrolytes and administer replacement therapy as ordered  Outcome: Progressing     Problem: METABOLIC/FLUID AND ELECTROLYTES - ADULT  Goal: Electrolytes maintained within normal limits  Description: INTERVENTIONS:  - Monitor labs and rhythm and assess patient for signs and symptoms of electrolyte imbalances  - Administer electrolyte replacement as ordered  - Monitor response to electrolyte replacements, including rhythm and repeat lab results as appropriate  - Fluid restriction as ordered  - Instruct patient on fluid and nutrition restrictions as appropriate  Outcome: Progressing

## 2024-04-29 NOTE — DISCHARGE INSTRUCTIONS
Going Home Instructions    In this section you will find the tools which will guide you through the first few days after you leave the hospital. Continued use of these tools will help you develop the skills necessary to keep your heart failure under control.       Home Care Instructions Following Heart Failure - the most important things to do every day include:     Weigh yourself  Take your medicines as prescribed  Limit your sodium (salt) and fluid intake  Know when to call your cardiologist, primary doctor, or nurse  Know when to seek emergency care    Things for You to Remember:   1. See your doctor or healthcare provider.  It is important that you attend this appointment to make sure your symptoms are under control.     2. Your recommended sodium intake is 8202-0709 mg daily    3. Limit your fluid intake to no more than 2 liters or 64 ounces per day    4. Some exercise and activity is important to help keep your heart functioning and strong. Unless instructed not to exercise, you may walk at a slow to moderate pace for 10-15 minutes 2-3 days per week to start. Pace your activity to prevent shortness of breath or fatigue. Stop exercise if you develop chest pain, lightheadedness, or significant shortness of breath.       Call Your Cardiologist If:   You gain 2 pounds overnight or 3-4 pounds in 3-5 days  You have more difficulty breathing  You are getting more tired with normal activity  You are more short of breath lying down, or awaken at night short of breath  You have swelling of your feet or legs  You urinate less often during the day and more often at night  You have cramps in your legs  You have blurred vision or see yellowish-green halos around objects of lights    Go to the Emergency Room If:   You have pain or tightness in your chest  You are extremely short of breath  You are coughing up pink-frothy mucus  You are traveling and develop symptoms of worsening heart failure    Fluid-Restricted Nutrition  Therapy    You have been prescribed this diet because your condition affects how much fluid you can eat or drink. If your heart, liver, or  kidneys aren’t working properly, you may not be able to effectively eliminate fluids from the body and this may cause swelling  (edema) in the legs, arms, and/or stomach.    Drink no more than 2 liters or 64 fluid ounces or 8 cup of fluid per day.    You don’t need to stop eating or drinking the same fluids you normally would, but you may need to eat or drink less than usual.  Your registered dietitian nutritionist will help you determine the correct amount of fluid to consume during the day    Tips    What Are Fluids?  A fluid is anything that is liquid or anything that would melt if left at room temperature. You will need to count these foods  and liquids--including any liquid used to take medication--as part of your daily fluid intake. Some examples are:    Alcohol (drink only with your doctor’s permission)  Coffee, tea, and other hot beverages  Gelatin (Jell-O)  Gravy  Ice cream, sherbet, sorbet  Ice cubes, ice chips  Milk, liquid creamer  Nutritional supplements  Popsicles  Vegetable and fruit juices; fluid in canned fruit  Watermelon  Yogurt  Soft drinks, lemonade, limeade  Soups  Syrup    1:1 ratio for example - 1 cup ice cream = 1 cup fluid    How Do I Measure My Fluid Intake?  Record your fluid intake daily.    Tip: Every day, each time you eat or drink fluids, pour water in the same amount into an empty container that can  hold the same amount of fluids you are allowed daily. This may help you keep track of how much fluid you are taking  in throughout the day.    To accurately keep track of how much liquid you take in, measure the size of the cups, glasses, and bowls you use. If  you eat soup, measure how much of it is liquid and how much is solid (such as noodles, vegetables, meat).    Conversions for Measuring Fluid Intake  Milliliters (mL)  Liters (L)  Ounces (oz)  Cups (c)   1000  1 32 4   1200  1.2 40 5   1500 1.5 50 6 1/4   1800 1.8 60 7 1/2   2000 2 67 8 1/3       Tips to Reduce Your Thirst  Chew gum or suck on hard candy.  Rinse or gargle with mouthwash. Do not swallow.  Ice chips or popsicles my help quench thirst, but this too needs to be calculated into the total restriction. Melt ice chips or cubes first to figure out how much fluid they produce (for example, experiment with melting ½ cup ice chips or 2 ice cubes).  Add a lemon wedge to your water.  Limit how much salt you take in. A high salt intake might make you thirstier.  Don’t eat or drink all your allowed liquids at once. Space your liquids out through the day.  Use small glasses and cups and sip slowly. If allowed, take your medications with fluids you eat or drink during a meal.      Fluid-Restricted Nutrition Therapy Sample 1-Day Menu  Breakfast  1 slice wheat toast  1 tablespoon peanut butter  1/2 cup yogurt (120 milliliters)  1/2 cup blueberries  1 cup milk (240 milliliters)  Lunch  3 ounces sliced turkey  2 slices whole wheat bread  1/2 cup lettuce for sandwich  2 slices tomato for sandwich  1 ounce reduced-fat, reduced-sodium cheese  1/2 cup fresh carrot sticks  1 banana  1 cup unsweetened tea (240 milliliters)  Evening Meal  8 ounces soup (240 milliliters)  3 ounces salmon  1/2 cup quinoa  1 cup green beans  1 cup mixed greens salad  1 tablespoon olive oil  1 cup coffee (240 milliliters)  Evening Snack  1/2 cup sliced peaches  1/2 cup frozen yogurt (120 milliliters)  1 cup water (240 milliliters)    TOTAL DAILY FLUID INTAKE: 1440 milliliters    Heart Healthy - Reduced Sodium Nutrition Therapy    A heart-healthy diet is recommended to reduce your unhealthy blood cholesterol levels, manage high blood pressure, and lower your risk for heart disease.  To follow a heart-healthy diet,   Eat a balanced diet with whole grains, fruits and vegetables, and lean protein sources.  Achieve and maintain a healthy  weight.  Choose heart-healthy unsaturated fats. Limit saturated fats, trans fats, and cholesterol intake. Eat more plant-based or vegetarian meals using beans and soy foods for protein.  Eat whole, unprocessed foods to limit the amount of sodium (salt) you eat.  Limit refined carbohydrates especially sugar, sweets and sugar-sweetened beverages.  If you drink alcohol, do so in moderation: one serving per day (women) and two servings per day (men).  One serving is equivalent to 12 ounces beer, 5 ounces wine, or 1.5 ounces distilled spirits    Tips  Tips for Choosing Heart-Healthy Fats  Choose lean protein and low-fat dairy foods to reduce saturated fat intake.  Saturated fat is usually found in animal-based protein and is associated with certain health risks. Saturated fat is the biggest contributor to raised low-density lipoprotein (LDL) cholesterol levels in the diet. Research shows that limiting saturated fat lowers unhealthy cholesterol levels.  Eat no more than 7% of your total calories each day from saturated fat.  Ask your RDN to help you determine how much saturated fat is right for you.  There are many foods that do not contain large amounts of saturated fats. Swapping these foods to replace foods high in saturated fats will help you limit the saturated fat you eat and improve your cholesterol levels. You can also try eating more plant-based or vegetarian meals.      Instead of…  Try...   Whole milk, cheese, yogurt, and ice  cream 1%, ½%, or skim milk, low-fat cheese, non-fat  yogurt, and low-fat ice cream   Fatty, marbled beef and pork  Lean beef, pork, or venison   Poultry with skin  Poultry without skin   Butter, stick margarine  Reduced-fat, whipped, or liquid spreads   Coconut oil, palm oil Liquid vegetable oils: corn, canola, olive, soybean  and safflower oils         Avoid trans fats.  Trans fats increase levels of LDL-cholesterol. Hydrogenated fat in processed foods is the main source of trans fats in  foods.  Trans fats can be found in stick margarine, shortening, processed sweets, baked goods, some fried foods, and packaged foods made with hydrogenated oils. Avoid foods with “partially hydrogenated oil” on the ingredient list such as: cookies, pastries, baked goods, biscuits, crackers, microwave popcorn, and frozen dinners.    Choose foods with heart healthy fats.  Polyunsaturated and monounsaturated fat are unsaturated fats that may help lower your blood cholesterol level when used in place of saturated fat in your diet.  Ask your RDN about taking a dietary supplement with plant sterols and stanols to help lower your cholesterol level.  Research shows that substituting saturated fats with unsaturated fats is beneficial to cholesterol levels. Try these easy swaps:    Instead of…  Try:   Butter, stick margarine, or solid  shortening Reduced-fat, whipped, or liquid spreads   Beef, pork, or poultry with skin Fish and seafood   Chips, crackers, snack foods Raw or unsalted nuts and seeds or nut  [place]  Hummus with vegetables  Avocado on toast   Coconut oil, palm oil Liquid vegetable oils: corn, canola, olive,  soybean and safflower oils       Limit the amount of cholesterol you eat to less than 200 milligrams per day.  Cholesterol is a substance carried through the bloodstream via lipoproteins, which are known as “transporters” of fat.  Some body functions need cholesterol to work properly, but too much cholesterol in the bloodstream can damage arteries and build up blood vessel linings (which can lead to heart attack and stroke). You should eat less than 200 milligrams cholesterol per day.  People respond differently to eating cholesterol. There is no test available right now that can figure out which people will respond more to dietary cholesterol and which will respond less. For individuals with high intake of dietary cholesterol, different types of increase (none, small, moderate, large) in LDL-cholesterol  levels are all possible.  Food sources of cholesterol include egg yolks and organ meats such as liver, gizzards. Limit egg yolks to two to four per week and avoid organ meats like liver and gizzards to control cholesterol intake.    Tips for Choosing Heart-Healthy Carbohydrates  Consume foods rich in viscous (soluble) fiber  Viscous, or soluble, fiber is found in the walls of plant cells. Viscous fiber is found only in plant-based foods--animalbased foods like meat or dairy products do not contain fiber. In the stomach, viscous fibers absorb water and swell to form a thick, jelly-like mass. This helps to lower your unhealthy cholesterol  Rich sources of viscous fiber include asparagus, [place] sprouts, sweet potatoes, turnips, apricots, mangoes, oranges, legumes, barley, oats, and oat bran.  Eat at least 5 to 10 grams of viscous fiber each day. As you increase your fiber intake gradually, also increase the amount of water you drink. This will help prevent constipation.  If you have difficulty achieving this goal, ask your RDN about fiber laxatives. Choose fiber supplements made with viscous fibers such as psyllium seed husks or methylcellulose to help lower unhealthy cholesterol.    Limit refined carbohydrates  There are three types of carbohydrates: starches, sugar, and fiber. Some carbohydrates occur naturally in food, like the starches in rice or corn or the sugars in fruits and milk. Refined carbohydrates--foods with high amounts of simple sugars--can raise triglyceride levels. High triglyceride levels are associated with coronary heart disease.  Some examples of refined carbohydrate foods are table sugar, sweets, and beverages sweetened with added sugar.    Tips for Reducing Sodium (Salt)  You should aim to limit your sodium intake to less than 2,000 mg sodium per day  Although sodium is important for your body to function, too much sodium can be harmful for people with high blood pressure.  As sodium and  fluid buildup in your tissues and bloodstream, your blood pressure increases. High blood pressure may cause damage to other organs and increase your risk for a stroke.  Even if you take a pill for blood pressure or a water pill (diuretic) to remove fluid, it is still important to have less salt in your diet. Ask your doctor and RDN what amount of sodium is right for you.  Avoid processed foods. Eat more fresh foods.  Fresh fruits and vegetables are naturally low in sodium, as well as frozen vegetables and fruits that have no added juices or sauces.  Fresh meats are lower in sodium than processed meats, such as garsia, sausage, and hotdogs. Read the nutrition label or ask your  to help you find a fresh meat that is low in sodium.  Eat less salt--at the table and when cooking.  A single teaspoon of table salt has 2,300 mg of sodium.  Leave the salt out of recipes for pasta, casseroles, and soups.    Ask your RDN how to cook your favorite recipes without sodium  Be a smart .  Look for food packages that say “salt-free” or “sodium-free.” These items contain less than 5 milligrams of sodium per serving.  “Very low-sodium” products contain less than 35 milligrams of sodium per serving.  “Low-sodium” products contain less than 140 milligrams of sodium per serving.  Beware of “reduced salt” or “reduced sodium” products. These items may still be high in sodium. Check the nutrition label.  Add flavors to your food without adding sodium.  Try lemon juice, lime juice, fruit juice or vinegar.  Dry or fresh herbs add flavor. Try basil, bay leaf, dill, rosemary, parsley, trent, dry mustard, nutmeg, thyme, and paprika.  Pepper, red pepper flakes, and cayenne pepper can add spice to your meals without adding sodium. Hot sauce contains sodium, but if you use just a drop or two, it will not add up to much.  Buy a sodium-free seasoning blend or make your own at home.    Additional Lifestyle Tips  Achieve and maintain a  healthy weight.  Talk with your RDN or your doctor about what is a healthy weight for you.  Set goals to reach and maintain that weight.  To lose weight, reduce your calorie intake along with increasing your physical activity. A weight loss of 10 to 15 pounds could reduce LDL-cholesterol by 5 milligrams per deciliter.  Participate in physical activity.  Talk with your health care team to find out what types of physical activity are best for you. Set a plan to get about 30 minutes of exercise on most days.    Foods Recommended  Grains  Grain foods including whole grains: whole wheat, barley, rye, buckwheat, corn, teff, quinoa, millet, amaranth, brown or wild rice, sorghum, and oats  Processed whole grains such as pasta, rice, hot and cold cereals, and snacks that contain less than 300 mg sodium per serving  Whole grain bread, crackers, rolls, or kalen with <80 mg sodium per slice (Note: yeast breads usually have less sodium than those made with baking soda),  Home-made bread made with reduced-sodium baking soda    Protein  Fresh red meat: lean, trimmed cuts of beef, pork, or lamb  Fresh poultry: skinless chicken or turkey  Fresh seafood: fish (particularly fatty fish: salmon, herring), shrimp, lobster, clams, and scallops  Eggs (2-4 per week), eggwhites or egg substitute  Nuts and seeds (unsalted): peanuts, almonds, pistachios, and sunflower seeds, unsalted; peanut butter, almond butter, and sunflower seed butter, reduced sodium.  Soy foods: tofu, tempeh, or soynuts  Meat alternatives: veggie burgers and sausages from plant protein without added sodium  Legumes: such as dried beans, lentils, or peas at least a few times per week in place of other protein sources, unsalted    Dairy  Skim, ½% or 1% milk, low-fat yogurt, low-sodium cheeses (Swiss cheese, ricotta cheese, and fresh mozzarella, low sodium cottage cheese)  Fortified soymilk, almond milk, rice milk, hemp milk  Frozen desserts (½ cup) made from low-fat  milk    Vegetables  Fresh, frozen, and canned (unsalted) whole vegetables, including dark-green, red and orange vegetables, legumes (beans and peas), and starchy vegetables without added sauces, salt, or sodium; low-sodium vegetable juices    Fruits  Fresh, frozen, canned and dried whole unsweetened fruits canned fruit packed in water or fruit juice without added sugar; 100% fruit juice    Oils  Unsaturated vegetable oils: [place], avocado, canola, cashew, corn, grapeseed, olive, safflower, sesame, soybean, sunflower  Margarines and spreads which list liquid vegetable oil as the first ingredient and does not contain trans fats (partially hydrogenated oil)  Salad dressings made from oil and low in sodium (salt)  Avocado    Other  Prepared foods, including soups, casseroles, and salads made from recommended ingredients and contain <600 mg sodium.  Homemade soups, casseroles, entrees, and side dishes typically contain less sodium that prepared alternatives.  Homemade soups and sauces such as gravy  Low-sodium crackers, chips, pretzels  Low-sodium seasonings (ketchup, BBQ)  Spices, herbs, Salt-free seasoning mixes and marinades  Vinegar  Lemon or lime juice      Foods Not Recommended  Grains  Breakfast cereals, packaged baked goods, snack crackers, and prepared grains with more than 300 mg sodium per serving  Biscuits, cornbread, and other “quick” breads prepared with baking soda  Breads or crackers topped with salt  Bakery products, such as doughnuts, biscuits, croissants, [place] pastries, pies, cookies  Instant potatoes, noodles, rice, stuffing mix, or macaroni and cheese  Snacks made with partially hydrogenated oils, including chips, cheese puffs, snack mixes, regular crackers, butter-flavored popcorn  Prepackaged bread crumbs  Self-rising flours    Protein  Meats high in saturated fat such as ribs, t-bone steak, regular 70/30 hamburger  Processed red meats, such as garsia, sausage, ham, pepperoni, hot dogs, corned  beef, cured or smoke meats, canned meat, chili, cindy sausage, sardines, and spam with added sodium  Deli meats, such as pastrami, bologna, or salami (made of meat or poultry) with added sodium  Organ meat such as liver, gizzards, or sweetbread  Preseasoned and precooked meats  Poultry with skin or processed poultry (chicken and turkey) with skin, breading, or high-sodium marinades or sauces  Whole eggs or egg yolks (greater than 5 per week)  Fried meat, poultry, or fish  Smoked fish and meats  Salted legumes, nuts, seeds, or nut/seed butters  Meat alternatives with high levels of sodium (>300 mg per serving) or saturated fat (>5 g per serving)    Dairy  Whole milk,?2% fat milk, or Buttermilk  Cream, half-&-half  Cream cheese, sour cream  Regular and processed cheese or sauces  Regular-sodium cottage cheese    Vegetables  Canned or frozen vegetables with salt, fresh vegetables prepared with salt, butter, cheese, or cream sauce  Pickled vegetables such as olives, pickles, or sauerkraut  Tomato or pasta sauce with high levels of salt (>300 mg per serving)  Fried vegetables    Fruits   None    Oils  Solid shortening or partially hydrogenated oils  Solid margarine made with hydrogenated or partially hydrogenated oils or trans fat  Salted margarine that contains trans fats  Butter (salted or unsalted)  Salad dressings with trans fat or high levels of sodium ([place], blue cheese, [place], Italian)  Tropical oils (coconut, palm, palm kernel oils)    Other  Sugary and/or fatty desserts, candy, and other sweets  Canned soups that are >600 mg of sodium  Frozen meals and prepared sides that are >600 mg of sodium  Store-bought egg beaters (with added sodium)  Salts: sea salt, kosher salt, onion salt, and garlic salt, seasoning mixes containing salt  Flavorings: bouillon cubes, catsup or ketchup, barbeque sauce, Worcestershire sauce, soy sauce, salsa, relish, teriyaki sauce      Heart-Healthy Eating Sample 1-Day  Menu  Breakfast  1 cup oatmeal  1 cup fat-free milk  1 cup blueberries  1 cup brewed coffee  1 ounce almonds  Lunch  2 slices whole-wheat bread  2 oz lean deli turkey breast  1 oz low-fat Swiss cheese  2 slices tomato  2 lettuce leaves  1 pear  1 cup skim milk  Afternoon Snack   1 oz trail mix (with nuts, seeds, raisins)  Evening Meal  3 oz broiled salmon  2/3 cup brown rice  1 tsp margarine  1/2 cup cooked broccoli  1/2 cup cooked carrots  1 cup tossed salad  1 teaspoon olive oil and vinegar dressing  1 small whole-wheat roll  1 tsp margarine  1 cup tea  Evening Snack   1 banana

## 2024-04-29 NOTE — PLAN OF CARE
Pt alert x 4- bp elevated see trend in epic- pt treated with prn bp meds-other vital signs stable- tele a-fib- on cardizem /heparin drip- lung sound diminished- sob noted with activity-  Plan of care explained  Con't to monitor vital signs/tele/sats  Con't to diurese with lasix iv BID  Daily wt  Echo in am  Problem: CARDIOVASCULAR - ADULT  Goal: Maintains optimal cardiac output and hemodynamic stability  Description: INTERVENTIONS:  - Monitor vital signs, rhythm, and trends  - Monitor for bleeding, hypotension and signs of decreased cardiac output  - Evaluate effectiveness of vasoactive medications to optimize hemodynamic stability  - Monitor arterial and/or venous puncture sites for bleeding and/or hematoma  - Assess quality of pulses, skin color and temperature  - Assess for signs of decreased coronary artery perfusion - ex. Angina  - Evaluate fluid balance, assess for edema, trend weights  Outcome: Progressing  Goal: Absence of cardiac arrhythmias or at baseline  Description: INTERVENTIONS:  - Continuous cardiac monitoring, monitor vital signs, obtain 12 lead EKG if indicated  - Evaluate effectiveness of antiarrhythmic and heart rate control medications as ordered  - Initiate emergency measures for life threatening arrhythmias  - Monitor electrolytes and administer replacement therapy as ordered  Outcome: Progressing     Problem: METABOLIC/FLUID AND ELECTROLYTES - ADULT  Goal: Electrolytes maintained within normal limits  Description: INTERVENTIONS:  - Monitor labs and rhythm and assess patient for signs and symptoms of electrolyte imbalances  - Administer electrolyte replacement as ordered  - Monitor response to electrolyte replacements, including rhythm and repeat lab results as appropriate  - Fluid restriction as ordered  - Instruct patient on fluid and nutrition restrictions as appropriate  Outcome: Progressing

## 2024-04-29 NOTE — DIETARY NOTE
[facility]   part of [facility]   CLINICAL NUTRITION    Mitesh Flood     Admitting diagnosis:  Atrial fibrillation with rapid ventricular response (HCC) [I48.91]  Acute congestive heart failure, unspecified heart failure type (HCC) [I50.9]    Ht: 193 cm (6' 4\")  Wt: (!) 148.5 kg (327 lb 6.1 oz).   Body mass index is 39.85 kg/m².  IBW: 92kg    Wt Readings from Last 6 Encounters:   04/29/24 (!) 148.5 kg (327 lb 6.1 oz)   04/28/24 (!) 144.7 kg (319 lb)   03/07/23 (!) 141.5 kg (312 lb)   02/16/23 (!) 141.5 kg (312 lb)   03/21/22 (!) 148.1 kg (326 lb 9.6 oz)   09/23/21 (!) 150.4 kg (331 lb 9.6 oz)        Labs/Meds reviewed    Diet:       Procedures    Cardiac diet Sodium Restriction: 2 GM NA; Fluid Restriction: 2000 ml; Is Patient on Accuchecks? No; Cardiac     Percent Meals Eaten (last 3 days)       Date/Time Percent Meals Eaten (%)    04/28/24 1934 100 %    04/29/24 0700 100 %              Pt chart reviewed d/t heart failure ed  Reviewed fluid guidelines and encouraged reduction in Na. Handouts attached to patient instruction.  Patient reports good appetite at this time.  Nursing notes reports Percent Meals Eaten (%): 100 % intake for last meal.  Tolerating po diet without diarrhea, emesis, or constipation.   No significant weight changes noted.     Patient is at low nutrition risk at this time.    Please consult if patient status changes or nutrition issues arise.    Elida Melo RD, LDN  Clinical Nutrition  [id number]

## 2024-04-29 NOTE — PROGRESS NOTES
[facility]   part of [facility]     Hospitalist Progress Note     Mitesh Flood Patient Status:  Inpatient    1965 MRN [id number]   Location [facility] 8NE-A Attending Du Kahn MD   Hosp Day # 1 PCP Shara Vance MD     Chief Complaint: dyspnea    Subjective:     Patient feels much better    Objective:    Review of Systems:   ROS completed; pertinent positive and negatives stated in subjective.    Vital signs:  Temp:  [97.8 °F (36.6 °C)-98.5 °F (36.9 °C)] 98.5 °F (36.9 °C)  Pulse:  [] 92  Resp:  [16-20] 20  BP: (114-186)/() 152/95  SpO2:  [87 %-97 %] 94 %    Physical Exam:    General: No acute distress  Respiratory: Clear to auscultation bilaterally  Cardiovascular: S1, S2. IR IR  Abdomen: Soft  Neuro: No new focal deficits      Diagnostic Data:    Labs:  Recent Labs   Lab 24  1452 24  0547   WBC 7.3 5.0   HGB 16.4 15.1   MCV 94.3 91.9   .0 146.0*   INR 1.13  --        Recent Labs   Lab 24  1452 24  0547   * 96   BUN 10 12   CREATSERUM 1.05 1.04   CA 9.1 8.5   ALB 4.0  --    * 139   K 3.8 3.4*  3.4*    107   CO2 21.0 25.0   ALKPHO 96  --    AST 33  --    ALT 48  --    BILT 2.7*  --    TP 7.9  --        Estimated Creatinine Clearance: 93.9 mL/min (based on SCr of 1.04 mg/dL).     Recent Labs   Lab 24  1452   TROPHS 21       Recent Labs   Lab 24  1452   PTP 14.5   INR 1.13       Lab Results   Component Value Date    TSH 1.400 2024         Imaging: Imaging data reviewed in Epic.    Medications:    potassium chloride  40 mEq Oral Q4H    losartan  100 mg Oral Daily    metoprolol succinate  100 mg Oral 2x Daily(Beta Blocker)    digoxin  250 mcg Intravenous Once    spironolactone  25 mg Oral Daily    furosemide  40 mg Intravenous BID (Diuretic)       Assessment & Plan:     #Rapid a. Fib  Echo pending  TSH WNL  Cardizem drip, wean as able  Heparin drip, transition to OAC soon  Cont.  toprol 50 BID      #CHF  Echo  Diurese  Started on aldactone     #HTN  Toprol  Add ARB     #Hypokalemia  Replace and monitor    #morbid obesity Body mass index is 39.96 kg/m².     Dispo: as above.  Discussed with cards. Await echo.  Likely will need cath.  Cont. To diurese.  On heparin drip, monitoring drug levels      Du Kahn MD    Supplementary Documentation:   P(1) + D(C1+C3) + R(heparin)  Quality:    DVT Mechanical Prophylaxis:   SCDs,    DVT Pharmacologic Prophylaxis   Medication    heparin (Porcine) 34698 units/250mL infusion ACS/AFIB CONTINUOUS                Code Status: Not on file  Hahn: No urinary catheter in place  Hahn Duration (in days):   Central line:    DESHAUN: 5/1/2024      Discharge is dependent on: clinical stability  At this point Mr. Flood is expected to be discharge to: home    The 21st Century Cures Act makes medical notes like these available to patients in the interest of transparency. Please be advised this is a medical document. Medical documents are intended to carry relevant information, facts as evident, and the clinical opinion of the practitioner. The medical note is intended as peer to peer communication and may appear blunt or direct. It is written in medical language and may contain abbreviations or verbiage that are unfamiliar.

## 2024-04-29 NOTE — PROGRESS NOTES
Already feels a bit better with breathing    Heart rates still up but some improvement overnight    Afebrile  Hypertensive    Lungs clear  Ht irregular  Abd soft  Ext less edema  Neuro intact    Good urine output    K+ 3.4    Echo pending      A/P: Newly recognized symptomatic afib with LBBB and rapid rates - duration unknown - clinically recent    Rate control    Anticoagulation - heparin for now    Diuresis    If LV dysfunction identified by echo will require R/L heart cath      L2

## 2024-04-30 ENCOUNTER — APPOINTMENT (OUTPATIENT)
Dept: INTERVENTIONAL RADIOLOGY/VASCULAR | Facility: HOSPITAL | Age: 59
End: 2024-04-30
Attending: NURSE PRACTITIONER
Payer: COMMERCIAL

## 2024-04-30 LAB
ANION GAP SERPL CALC-SCNC: 4 MMOL/L (ref 0–18)
APTT PPP: 30.3 SECONDS (ref 23.3–35.6)
APTT PPP: 30.9 SECONDS (ref 23.3–35.6)
APTT PPP: 34.9 SECONDS (ref 23.3–35.6)
APTT PPP: 42.4 SECONDS (ref 23.3–35.6)
BUN BLD-MCNC: 15 MG/DL (ref 9–23)
CALCIUM BLD-MCNC: 9.2 MG/DL (ref 8.5–10.1)
CHLORIDE SERPL-SCNC: 107 MMOL/L (ref 98–112)
CO2 SERPL-SCNC: 29 MMOL/L (ref 21–32)
CREAT BLD-MCNC: 1.1 MG/DL
EGFRCR SERPLBLD CKD-EPI 2021: 77 ML/MIN/1.73M2 (ref 60–?)
GLUCOSE BLD-MCNC: 92 MG/DL (ref 70–99)
OSMOLALITY SERPL CALC.SUM OF ELEC: 290 MOSM/KG (ref 275–295)
POTASSIUM SERPL-SCNC: 3.7 MMOL/L (ref 3.5–5.1)
POTASSIUM SERPL-SCNC: 3.7 MMOL/L (ref 3.5–5.1)
SODIUM SERPL-SCNC: 140 MMOL/L (ref 136–145)

## 2024-04-30 PROCEDURE — B2151ZZ FLUOROSCOPY OF LEFT HEART USING LOW OSMOLAR CONTRAST: ICD-10-PCS | Performed by: INTERNAL MEDICINE

## 2024-04-30 PROCEDURE — 99233 SBSQ HOSP IP/OBS HIGH 50: CPT | Performed by: HOSPITALIST

## 2024-04-30 PROCEDURE — B2111ZZ FLUOROSCOPY OF MULTIPLE CORONARY ARTERIES USING LOW OSMOLAR CONTRAST: ICD-10-PCS | Performed by: INTERNAL MEDICINE

## 2024-04-30 PROCEDURE — 4A023N8 MEASUREMENT OF CARDIAC SAMPLING AND PRESSURE, BILATERAL, PERCUTANEOUS APPROACH: ICD-10-PCS | Performed by: INTERNAL MEDICINE

## 2024-04-30 RX ORDER — HEPARIN SODIUM 5000 [USP'U]/ML
INJECTION, SOLUTION INTRAVENOUS; SUBCUTANEOUS
Status: COMPLETED
Start: 2024-04-30 | End: 2024-04-30

## 2024-04-30 RX ORDER — ASPIRIN 81 MG/1
81 TABLET ORAL DAILY
Status: DISCONTINUED | OUTPATIENT
Start: 2024-04-30 | End: 2024-05-02

## 2024-04-30 RX ORDER — HEPARIN SODIUM 5000 [USP'U]/ML
5000 INJECTION, SOLUTION INTRAVENOUS; SUBCUTANEOUS ONCE
Status: DISCONTINUED | OUTPATIENT
Start: 2024-05-01 | End: 2024-05-01

## 2024-04-30 RX ORDER — VERAPAMIL HYDROCHLORIDE 2.5 MG/ML
INJECTION, SOLUTION INTRAVENOUS
Status: COMPLETED
Start: 2024-04-30 | End: 2024-04-30

## 2024-04-30 RX ORDER — VALSARTAN 320 MG/1
320 TABLET ORAL DAILY
Status: DISCONTINUED | OUTPATIENT
Start: 2024-04-30 | End: 2024-05-01

## 2024-04-30 RX ORDER — HEPARIN SODIUM 1000 [USP'U]/ML
3000 INJECTION, SOLUTION INTRAVENOUS; SUBCUTANEOUS ONCE
Status: DISCONTINUED | OUTPATIENT
Start: 2024-05-01 | End: 2024-05-01

## 2024-04-30 RX ORDER — HEPARIN SODIUM 1000 [USP'U]/ML
3000 INJECTION, SOLUTION INTRAVENOUS; SUBCUTANEOUS ONCE
Status: COMPLETED | OUTPATIENT
Start: 2024-04-30 | End: 2024-04-30

## 2024-04-30 RX ORDER — SODIUM CHLORIDE 9 MG/ML
INJECTION, SOLUTION INTRAVENOUS
Status: DISCONTINUED | OUTPATIENT
Start: 2024-05-01 | End: 2024-04-30 | Stop reason: HOSPADM

## 2024-04-30 RX ORDER — LIDOCAINE HYDROCHLORIDE 10 MG/ML
INJECTION, SOLUTION EPIDURAL; INFILTRATION; INTRACAUDAL; PERINEURAL
Status: COMPLETED
Start: 2024-04-30 | End: 2024-04-30

## 2024-04-30 RX ORDER — ATORVASTATIN CALCIUM 40 MG/1
40 TABLET, FILM COATED ORAL NIGHTLY
Status: DISCONTINUED | OUTPATIENT
Start: 2024-04-30 | End: 2024-04-30

## 2024-04-30 RX ORDER — MIDAZOLAM HYDROCHLORIDE 1 MG/ML
INJECTION INTRAMUSCULAR; INTRAVENOUS
Status: COMPLETED
Start: 2024-04-30 | End: 2024-04-30

## 2024-04-30 NOTE — PROGRESS NOTES
Cardiology Progress Note    Mitesh Flood Patient Status:  Inpatient    1965 MRN [id number]   [facility] 8NE-A Attending Du Kahn MD   Hosp Day # 2 PCP Shara Vance MD       Subjective:   Patient seen and examined this AM. No complaints. No acute overnight events.      Objective:   Temp: 97.6 °F (36.4 °C)  Pulse: 76  Resp: 19  BP: 141/112    Intake/Output:     Intake/Output Summary (Last 24 hours) at 2024 1041  Last data filed at 2024 0815  Gross per 24 hour   Intake 1388 ml   Output 1200 ml   Net 188 ml       Last 3 Weights   24 0442 (!) 321 lb 6.9 oz (145.8 kg)   24 0425 (!) 327 lb 6.1 oz (148.5 kg)   24 1822 (!) 328 lb 4.2 oz (148.9 kg)   24 1757 (!) 328 lb 4.2 oz (148.9 kg)   24 1432 (!) 319 lb (144.7 kg)   24 1330 (!) 319 lb (144.7 kg)   23 0812 (!) 312 lb (141.5 kg)       Physical Exam:     General: Alert and oriented x 3. No apparent distress. No respiratory or constitutional distress. Obese.  HEENT: Normocephalic, anicteric sclera, neck supple.  Neck: Supple.   Cardiac: no murmur. Irregularly irregular.   Lungs: Clear without wheezes, rales, rhonchi or dullness.  Normal excursions and effort.  Abdomen: Soft, non-tender.   Extremities: trace edema b/l LE   Neurologic: Alert and oriented, normal affect.  Skin: Warm and dry.     Laboratory/Data:    Labs:         Recent Labs   Lab 24  1452 24  0547   WBC 7.3 5.0   HGB 16.4 15.1   MCV 94.3 91.9   .0 146.0*   INR 1.13  --        Recent Labs   Lab 24  1452 24  0547 24  1613 24  0655   * 139  --  140   K 3.8 3.4*  3.4* 3.7 3.7  3.7    107  --  107   CO2 21.0 25.0  --  29.0   BUN 10 12  --  15   CREATSERUM 1.05 1.04  --  1.10   CA 9.1 8.5  --  9.2   MG  --  2.2  --   --    * 96  --  92       Recent Labs   Lab 24  1452   ALT 48   AST 33   ALB 4.0       No results for input(s): \"TROP\" in the last 168  hours.    Allergies:   Allergies   Allergen Reactions    Lisinopril Coughing       Medications:  Current Facility-Administered Medications   Medication Dose Route Frequency    valsartan (Diovan) tab 320 mg  320 mg Oral Daily    aspirin DR tab 81 mg  81 mg Oral Daily    metoprolol succinate ER (Toprol XL) 24 hr tab 100 mg  100 mg Oral 2x Daily(Beta Blocker)    spironolactone (Aldactone) tab 25 mg  25 mg Oral Daily    amLODIPine (Norvasc) tab 5 mg  5 mg Oral Daily    acetaminophen (Tylenol Extra Strength) tab 1,000 mg  1,000 mg Oral Q4H PRN    melatonin tab 3 mg  3 mg Oral Nightly PRN    glycerin-hypromellose- (Artificial Tears) 0.2-0.2-1 % ophthalmic solution 1 drop  1 drop Both Eyes QID PRN    sodium chloride (Saline Mist) 0.65 % nasal solution 1 spray  1 spray Each Nare Q3H PRN    ondansetron (Zofran) 4 MG/2ML injection 4 mg  4 mg Intravenous Q6H PRN    prochlorperazine (Compazine) 10 MG/2ML injection 5 mg  5 mg Intravenous Q8H PRN    polyethylene glycol (PEG 3350) (Miralax) 17 g oral packet 17 g  17 g Oral Daily PRN    sennosides (Senokot) tab 17.2 mg  17.2 mg Oral Nightly PRN    bisacodyl (Dulcolax) 10 MG rectal suppository 10 mg  10 mg Rectal Daily PRN    fleet enema (Fleet) 7-19 GM/118ML rectal enema 133 mL  1 enema Rectal Once PRN    heparin (Porcine) 08173 units/250mL infusion ACS/AFIB CONTINUOUS  200-3,000 Units/hr Intravenous Continuous    dilTIAZem 10 mg BOLUS FROM BAG infusion  10 mg Intravenous Q1H PRN    dilTIAZem (cardIZEM) 100 mg in sodium chloride 0.9% 100 mL IVPB-ADDV  2.5-20 mg/hr Intravenous Continuous    labetalol (Trandate) 5 mg/mL injection 10 mg  10 mg Intravenous Q4H PRN    diphenhydrAMINE (Benadryl) 50 mg/mL  injection 12.5 mg  12.5 mg Intravenous Q6H PRN    hydrOXYzine (Atarax) tab 10 mg  10 mg Oral TID PRN    lidocaine-menthol 4-1 % patch 1 patch  1 patch Transdermal Daily PRN    benzocaine-menthol (Cepacol) lozenge 1 lozenge  1 lozenge Oral PRN    benzonatate (Tessalon) cap 200 mg   200 mg Oral TID PRN    simethicone (Mylicon) chewable tab 80 mg  80 mg Oral QID PRN    calcium carbonate (Tums) chewable tab 500 mg  500 mg Oral TID PRN    amLODIPine (Norvasc) tab 2.5 mg  2.5 mg Oral Q6H PRN    furosemide (Lasix) 10 mg/mL injection 40 mg  40 mg Intravenous BID (Diuretic)         Assessment:  Afib, with RVR   Acute HFrEF, LVEF 35-40%   Newly noted LBBB  HTN  HLD      Plan:  Drop in LVEF may be related to tachy induced cardiomyopathy, however, given LBBB, would recommend Right and left heart catheterization for further eval   Cont lasix 40 IV bid with dose adjustment based on RHC  Cont heparin gtt with eventual transition to DOAC  Cont BB   BP elevated, agree with diovan. Will optimize GDMT post-cath.   Will cont to follow   Patient was consented for left and right heart cardiac catheterization. The risks and benefits of the procedure were explained to the patient. 1-2% risk of coronary angiography, possible angioplasty, include, but are not limited to stroke, death, heart attack, coronary dissection requiring emergent CABG, hematoma, bleeding, allergic reaction to medications, vascular damage requiring surgical repair, kidney failure requiring dialysis and others. Patient wishes to proceed.      Kapil Bowen DO  Cardiologist  [facility]  4/30/2024 10:41 AM        Note to the patient: The 21st Century Cures Act makes medical notes like these available to patients in the interest of transparency. However, be advised that this is a medical document. It is intended as peer to peer communication. It is written in medical language and may contain abbreviations or verbiage that are unfamiliar. It may appear blunt or direct. Medical documents are intended to carry relevant information, facts as evident, and clinical opinion of the practitioner.     Disclaimer: Components of this note were documented using voice recognition system and are subject to errors not corrected at proofreading.  Contact the author of this note for any clarifications.

## 2024-04-30 NOTE — PROGRESS NOTES
Notified by primary RN-Augustina, pt's last -114 after amlodipine 5 mg po. Additional amlodipine 5 mg po ordered.

## 2024-04-30 NOTE — PROGRESS NOTES
[facility]   part of [facility]     Hospitalist Progress Note     Mitesh Flood Patient Status:  Inpatient    1965 MRN [id number]   Location [facility] 8NE-A Attending Du Kahn MD   Hosp Day # 2 PCP Shara Vance MD     Chief Complaint: dyspnea    Subjective:     Patient feels well.  HR better.     Objective:    Review of Systems:   ROS completed; pertinent positive and negatives stated in subjective.    Vital signs:  Temp:  [97.5 °F (36.4 °C)-98.6 °F (37 °C)] 97.5 °F (36.4 °C)  Pulse:  [] 79  Resp:  [18-20] 20  BP: (150-169)/() 150/98  SpO2:  [91 %-98 %] 95 %    Physical Exam:    General: No acute distress  Respiratory: Clear to auscultation bilaterally  Cardiovascular: S1, S2. IR IR  Abdomen: Soft  Neuro: No new focal deficits      Diagnostic Data:    Labs:  Recent Labs   Lab 24  14524  0547   WBC 7.3 5.0   HGB 16.4 15.1   MCV 94.3 91.9   .0 146.0*   INR 1.13  --        Recent Labs   Lab 24  1452 24  0547 24  1613   * 96  --    BUN 10 12  --    CREATSERUM 1.05 1.04  --    CA 9.1 8.5  --    ALB 4.0  --   --    * 139  --    K 3.8 3.4*  3.4* 3.7    107  --    CO2 21.0 25.0  --    ALKPHO 96  --   --    AST 33  --   --    ALT 48  --   --    BILT 2.7*  --   --    TP 7.9  --   --        Estimated Creatinine Clearance: 93.9 mL/min (based on SCr of 1.04 mg/dL).     Recent Labs   Lab 24  1452   TROPHS 21       Recent Labs   Lab 24  145   PTP 14.5   INR 1.13                Imaging: Imaging data reviewed in Epic.    Medications:    valsartan  320 mg Oral Daily    metoprolol succinate  100 mg Oral 2x Daily(Beta Blocker)    spironolactone  25 mg Oral Daily    amLODIPine  5 mg Oral Daily    furosemide  40 mg Intravenous BID (Diuretic)       Assessment & Plan:     #Rapid a. Fib  Echo noted  TSH WNL  Off cardizem  Cont. Toprol 100 BID  Heparin drip, transition to OAC soon    #Acute HFrEF EF: 35-40%  Possibly rate  related  Echo noted  Diurese  ASA/BB  LDL noted, statin depending on cath. Dec EF possibly rate related  Will need cath     #HTN  BP remains elevated  Losartan -> diovan, will dec EF could consider entresto  On toprol, norvasc, aldactone  Send renin/matt and plasma metanephrines     #Hypokalemia  Replace and monitor    #morbid obesity Body mass index is 39.96 kg/m².    #LENARD  CPAP     Dispo: as above.  On heparin drip.  EF decreased, will need cath.  BP remains significantly elevated      Du Kahn MD    Supplementary Documentation:   P(1) + D(C1+C3) + R(heparin)  Quality:    DVT Mechanical Prophylaxis:   SCDs,    DVT Pharmacologic Prophylaxis   Medication    heparin (Porcine) 64813 units/250mL infusion ACS/AFIB CONTINUOUS                Code Status: Not on file  Hahn: No urinary catheter in place  Hahn Duration (in days):   Central line:    DESHAUN: 5/1/2024      Discharge is dependent on: clinical stability  At this point Mr. Flood is expected to be discharge to: home    The 21st Century Cures Act makes medical notes like these available to patients in the interest of transparency. Please be advised this is a medical document. Medical documents are intended to carry relevant information, facts as evident, and the clinical opinion of the practitioner. The medical note is intended as peer to peer communication and may appear blunt or direct. It is written in medical language and may contain abbreviations or verbiage that are unfamiliar.

## 2024-04-30 NOTE — PLAN OF CARE
Assumed care for pt. At 1930. A&Ox4. Denies pain or shortness of breath at rest. On room air while awake, attempted to use CPAP but declining to use for the rest of the night. Afib on tele, hr 80-90's. BP remains elevated, [employer] notified with orders received. Cardizem drip infusing at 5mg/hr. Heparin drip infusing per protocol. NPO for possible cath. Up to bathroom with steady gait, voiding freely.     Problem: Patient/Family Goals  Goal: Patient/Family Long Term Goal  Description: Patient's Long Term Goal: to go home    Interventions:  - labs, tele monitoring, echo, BP control, increase activity, HR control    - See additional Care Plan goals for specific interventions  Outcome: Progressing  Goal: Patient/Family Short Term Goal  Description: Patient's Short Term Goal: feel better    Interventions:   - - labs, tele monitoring, echo, BP control, increase activity, HR control    - See additional Care Plan goals for specific interventions  Outcome: Progressing     Problem: CARDIOVASCULAR - ADULT  Goal: Maintains optimal cardiac output and hemodynamic stability  Description: INTERVENTIONS:  - Monitor vital signs, rhythm, and trends  - Monitor for bleeding, hypotension and signs of decreased cardiac output  - Evaluate effectiveness of vasoactive medications to optimize hemodynamic stability  - Monitor arterial and/or venous puncture sites for bleeding and/or hematoma  - Assess quality of pulses, skin color and temperature  - Assess for signs of decreased coronary artery perfusion - ex. Angina  - Evaluate fluid balance, assess for edema, trend weights  Outcome: Progressing  Goal: Absence of cardiac arrhythmias or at baseline  Description: INTERVENTIONS:  - Continuous cardiac monitoring, monitor vital signs, obtain 12 lead EKG if indicated  - Evaluate effectiveness of antiarrhythmic and heart rate control medications as ordered  - Initiate emergency measures for life threatening arrhythmias  - Monitor electrolytes and  administer replacement therapy as ordered  Outcome: Progressing     Problem: METABOLIC/FLUID AND ELECTROLYTES - ADULT  Goal: Electrolytes maintained within normal limits  Description: INTERVENTIONS:  - Monitor labs and rhythm and assess patient for signs and symptoms of electrolyte imbalances  - Administer electrolyte replacement as ordered  - Monitor response to electrolyte replacements, including rhythm and repeat lab results as appropriate  - Fluid restriction as ordered  - Instruct patient on fluid and nutrition restrictions as appropriate  Outcome: Progressing

## 2024-04-30 NOTE — PROCEDURES
[facility]   part of [facility]    Cardiac Catheterization Note    Primary Proceduralist: Fiordaliza Jamison MD  Procedure Performed: LHC and RHC  Date of Procedure: 4/30/2024   Indication: HFrEF, CHF  Pre Operative Diagnosis: CHF  Post Operative Diagnosis: CHF  Estimated blood loss: 10cc  Specimens: None    Consent obtained from the patient and documented in the paper chart, unless verbally obtained in an emergency setting.  The benefits and risk of the procedure, including but not limited to infection, bleeding, myocardial infarction, stroke, vascular injury, emergency surgery, renal failure requiring dialysis and death were discussed with the patient. These complications occur in approximately 1-2% of elective procedures, but the risk may be significantly elevated in the setting of acute coronary syndrome, electrical or hemodynamic instability, multivessel disease, reduced LVEF or . The patient consented to any additional procedures performed emergently in order to address a complication or prevent medical deterioration. Viable alternatives were explained to the patient, including continuing medical therapy, with the risks incurred along that course.      Procedure/Technique:    Lidocaine 1% was administered locally. Access of right radial artery obtained using Seldinger technique. Ultrasound was not used.     6 Fr Sheath was inserted. J-wire advanced into the aorta under fluoroscopy.    Left coronary system engaged using 6 Fr TIG. Selective angiogram performed.     Right coronary system engaged using 6 Fr TIG.  Selective angiogram performed. 6 Fr pigtail Catheter advanced into the LV. Hemodynamics were obtained.    Coronary Angiogram Findings:  LM: Large caliber artery giving rise to LAD & LCX. No significant stenosis.  LAD: Large caliber artery giving rise to diagonal and septal branches. No significant stenosis..  LCX: Medium to large caliber artery giving rise to OM  branches. No significant stenosis..  RCA: Large caliber artery giving rise to acute marginal branches, and bifurcates into RPDA & RPL. No significant stenosis. Right dominant circulation    Hemodynamics:  LV .130/12, LVEDP 22  /90, mean 108  No significant gradient upon Ao-LV pullback  LVEF 40%    RHC:     Technique:  The micropuncture needle was used to access the right basilic vein. US used.     5 Fr sheath was inserted. The swan was advanced to RA, RV, PA and wedge position obtaining pressures as described above.    Hemodynamics:   RA 18  RV 46/10  PA 48/30 mean 36  PCW 24-26    CO 7.3 L/min / CI 2.7 L/min/m2 (per Louis)  PVR 1.8 wood units      Intervention:  None    Monitored sedation administered by the cath lab RN, and supervised throughout the procedure by myself. Total time 30 minutes.     Closure: Radial band.    No immediate complications.    A/P:  No significant CAD angiographically. Right dominant circulation  LVEF 40%, LVEDP 22  Mean PAP 36  Normal CO  Medical management      Fiordaliza Jamison MD  Interventional Cardiology  [facility]

## 2024-04-30 NOTE — PLAN OF CARE
Patient alert and oriented x 4. On RA. Up ad chintan. Afib on tele. Continent of bowel/bladder. No complaints of pain, shortness of breath, chest pain/discomfort. POC: R/LHC today, cardizem and heparin gtt. Call light within reach. Fall precautions in place.     Problem: Patient/Family Goals  Goal: Patient/Family Long Term Goal  Description: Patient's Long Term Goal: to go home    Interventions:  - labs, tele monitoring, echo, BP control, increase activity, HR control    - See additional Care Plan goals for specific interventions  Outcome: Progressing  Goal: Patient/Family Short Term Goal  Description: Patient's Short Term Goal: feel better    Interventions:   - - labs, tele monitoring, echo, BP control, increase activity, HR control    - See additional Care Plan goals for specific interventions  Outcome: Progressing     Problem: CARDIOVASCULAR - ADULT  Goal: Maintains optimal cardiac output and hemodynamic stability  Description: INTERVENTIONS:  - Monitor vital signs, rhythm, and trends  - Monitor for bleeding, hypotension and signs of decreased cardiac output  - Evaluate effectiveness of vasoactive medications to optimize hemodynamic stability  - Monitor arterial and/or venous puncture sites for bleeding and/or hematoma  - Assess quality of pulses, skin color and temperature  - Assess for signs of decreased coronary artery perfusion - ex. Angina  - Evaluate fluid balance, assess for edema, trend weights  Outcome: Progressing  Goal: Absence of cardiac arrhythmias or at baseline  Description: INTERVENTIONS:  - Continuous cardiac monitoring, monitor vital signs, obtain 12 lead EKG if indicated  - Evaluate effectiveness of antiarrhythmic and heart rate control medications as ordered  - Initiate emergency measures for life threatening arrhythmias  - Monitor electrolytes and administer replacement therapy as ordered  Outcome: Progressing     Problem: METABOLIC/FLUID AND ELECTROLYTES - ADULT  Goal: Electrolytes maintained  within normal limits  Description: INTERVENTIONS:  - Monitor labs and rhythm and assess patient for signs and symptoms of electrolyte imbalances  - Administer electrolyte replacement as ordered  - Monitor response to electrolyte replacements, including rhythm and repeat lab results as appropriate  - Fluid restriction as ordered  - Instruct patient on fluid and nutrition restrictions as appropriate  Outcome: Progressing

## 2024-05-01 LAB
ANION GAP SERPL CALC-SCNC: 4 MMOL/L (ref 0–18)
APTT PPP: 29.6 SECONDS (ref 23.3–35.6)
APTT PPP: 48.9 SECONDS (ref 23.3–35.6)
BUN BLD-MCNC: 14 MG/DL (ref 9–23)
CALCIUM BLD-MCNC: 9.9 MG/DL (ref 8.5–10.1)
CHLORIDE SERPL-SCNC: 103 MMOL/L (ref 98–112)
CO2 SERPL-SCNC: 26 MMOL/L (ref 21–32)
CREAT BLD-MCNC: 1.02 MG/DL
EGFRCR SERPLBLD CKD-EPI 2021: 85 ML/MIN/1.73M2 (ref 60–?)
GLUCOSE BLD-MCNC: 107 MG/DL (ref 70–99)
OSMOLALITY SERPL CALC.SUM OF ELEC: 277 MOSM/KG (ref 275–295)
POTASSIUM SERPL-SCNC: 4 MMOL/L (ref 3.5–5.1)
SODIUM SERPL-SCNC: 133 MMOL/L (ref 136–145)

## 2024-05-01 PROCEDURE — 99232 SBSQ HOSP IP/OBS MODERATE 35: CPT | Performed by: INTERNAL MEDICINE

## 2024-05-01 RX ORDER — SACUBITRIL AND VALSARTAN 24; 26 MG/1; MG/1
1 TABLET, FILM COATED ORAL 2 TIMES DAILY
Qty: 60 TABLET | Refills: 6 | Status: SHIPPED | OUTPATIENT
Start: 2024-05-01

## 2024-05-01 RX ORDER — HEPARIN SODIUM 1000 [USP'U]/ML
5000 INJECTION, SOLUTION INTRAVENOUS; SUBCUTANEOUS ONCE
Status: COMPLETED | OUTPATIENT
Start: 2024-05-01 | End: 2024-05-01

## 2024-05-01 NOTE — PROGRESS NOTES
[facility]   part of [facility]     Hospitalist Progress Note     Mitesh Flood Patient Status:  Inpatient    1965 MRN [id number]   Location [facility] 8NE-A Attending Meenu Pappas DO    Hosp Day # 3 PCP Shara Vance MD     Chief Complaint: dyspnea    Subjective:     Patient  no new issues tolerated procedures well- needing education on plan of care     Objective:    Review of Systems:   ROS completed; pertinent positive and negatives stated in subjective.    Vital signs:  Temp:  [97.6 °F (36.4 °C)-98.2 °F (36.8 °C)] 98.2 °F (36.8 °C)  Pulse:  [73-87] 82  Resp:  [19-22] 22  BP: (133-156)/() 140/106  SpO2:  [89 %-98 %] 96 %    Physical Exam:    General: No acute distress  AO   Respiratory: Clear to auscultation bilaterally  Cardiovascular: S1, S2. IR IR af   Abdomen: Soft nt + BS obese   Neuro: No new focal deficits  Extremities No edema  RUE w/ sl redness from IV site, r radial and brachial pulses + , sites d/I, +CMS     Diagnostic Data:    Labs:  Recent Labs   Lab 24  1452 24  0547   WBC 7.3 5.0   HGB 16.4 15.1   MCV 94.3 91.9   .0 146.0*   INR 1.13  --        Recent Labs   Lab 24  1452 24  0547 24  1613 24  0655   * 96  --  92   BUN 10 12  --  15   CREATSERUM 1.05 1.04  --  1.10   CA 9.1 8.5  --  9.2   ALB 4.0  --   --   --    * 139  --  140   K 3.8 3.4*  3.4* 3.7 3.7  3.7    107  --  107   CO2 21.0 25.0  --  29.0   ALKPHO 96  --   --   --    AST 33  --   --   --    ALT 48  --   --   --    BILT 2.7*  --   --   --    TP 7.9  --   --   --        Estimated Creatinine Clearance: 88.8 mL/min (based on SCr of 1.1 mg/dL).     Recent Labs   Lab 24  1452   TROPHS 21       Recent Labs   Lab 24  1452   PTP 14.5   INR 1.13          Imaging: Imaging data reviewed in Epic.    Medications:    sacubitril-valsartan  1 tablet Oral BID    aspirin  81 mg Oral Daily    metoprolol succinate  100 mg Oral 2x Daily(Beta Blocker)     spironolactone  25 mg Oral Daily       Assessment & Plan:     #Rapid a. Fib  Off  cardizem gtt  rates better   - heaprin gtt  mostly not therap. In dosing   Need eliquis 5 mg BID pricing  social work consult - will order med   - cards considering AKILAH/ DCCV   Cont. Toprol 100 BID  Heparin drip, transition to DOAC soon    #Acute HFrEF EF: 35-40% related to tachycardia  D/p R/LHC  No CAD, elevated PAP pressures, normal CO   Start bumex gtt  PA pressures elevated on RHC   Aldactone   ASA/BB  Stop valsarten/ start entresto   Fluid restriction, I&o's   BMP daily   Education   Fluid restriction , daily wts        #HTN  BP remains elevated   Starting  entresto  On toprol,  Send renin/matt and plasma metanephrines P      #Hypokalemia  Replace and monitor    #morbid obesity Body mass index is 39.96 kg/m².    #LENARD  CPAP     Dispo: as above.    Education   Warm packs r UE  AKILAH/ DCCV when more fluid removed   Check pricing for scott Corrales NP     Addendum:    Agree with above note except as documented below.      Gen: NAD  CVS: IRIR, tachycardic   Resp: CTA  Abd: soft    #Atrial fibrillation with rapid ventricular rates  #Acute HFrEF - tachycardia mediated    Bumex drip  Goal-directed medical therapy with hemodynamic monitoring initiated  Plan for AKILAH/cardioversion possibly tomorrow          Pt seen and examined independently. Chart reviewed. Labs and imaging over the last 24 hours have been personally reviewed.  I personally made/approved 100% of the management plan for this patient and take full responsibility for the patient management.   Note has been reviewed by me and modified as needed.  Exam and Impression/ Recs as noted above.  D/w staff.    Meenu Pappas DO          P(1) + D(C1+C3) + R(heparin)  Quality:    DVT Mechanical Prophylaxis:   SCDs,    DVT Pharmacologic Prophylaxis   Medication    heparin (Porcine) 35934 units/250mL infusion ACS/AFIB CONTINUOUS         DVT Pharmacologic prophylaxis: Aspirin  81 mg      Code Status: Not on file  Hahn: No urinary catheter in place  Hahn Duration (in days):   Central line:    DESHAUN: 5/2/2024      Discharge is dependent on: clinical stability  At this point Mr. Flood is expected to be discharge to: home    The 21st Century Cures Act makes medical notes like these available to patients in the interest of transparency. Please be advised this is a medical document. Medical documents are intended to carry relevant information, facts as evident, and the clinical opinion of the practitioner. The medical note is intended as peer to peer communication and may appear blunt or direct. It is written in medical language and may contain abbreviations or verbiage that are unfamiliar.

## 2024-05-01 NOTE — CM/SW NOTE
Elliquis and Entresto rx's priced - pt entitled to the free 30 day coupon for both and then can use the $10 copay savings cards for both. RN updated. [place] pharmacy can dispense the medications and provide coupons.    Barbara Valenzuela, MSW, LSW  Discharge Planner  [phone]

## 2024-05-01 NOTE — PROGRESS NOTES
[facility] Cardiology Progress Note    Mitesh Flood Patient Status:  Inpatient    1965 MRN [id number]   Location [facility] 8NE-A Attending Meenu Pappas,    Hosp Day # 3 PCP Shara Vance MD     Subjective:  No acute events overnight  \"I feel fine\"   Pt lying flat on his side, asleep in bed no issues    Objective:  BP (!) 140/106 (BP Location: Left arm)   Pulse 82   Temp 98.2 °F (36.8 °C) (Oral)   Resp 22   Ht 6' 4\" (1.93 m)   Wt (!) 320 lb 5.3 oz (145.3 kg)   SpO2 96%   BMI 38.99 kg/m²     Telemetry: Afib      Intake/Output:    Intake/Output Summary (Last 24 hours) at 2024 0724  Last data filed at 2024 0119  Gross per 24 hour   Intake 960 ml   Output 1100 ml   Net -140 ml       Last 3 Weights   24 0515 (!) 320 lb 5.3 oz (145.3 kg)   24 0442 (!) 321 lb 6.9 oz (145.8 kg)   24 0425 (!) 327 lb 6.1 oz (148.5 kg)   24 1822 (!) 328 lb 4.2 oz (148.9 kg)   24 1757 (!) 328 lb 4.2 oz (148.9 kg)   24 1432 (!) 319 lb (144.7 kg)   24 1330 (!) 319 lb (144.7 kg)   23 0812 (!) 312 lb (141.5 kg)       Labs:  Recent Labs   Lab 24  1452 24  0547 24  1613 24  0655   * 96  --  92   BUN 10 12  --  15   CREATSERUM 1.05 1.04  --  1.10   EGFRCR 82 83  --  77   CA 9.1 8.5  --  9.2   * 139  --  140   K 3.8 3.4*  3.4* 3.7 3.7  3.7    107  --  107   CO2 21.0 25.0  --  29.0     Recent Labs   Lab 24  1452 24  0547   RBC 5.05 4.54   HGB 16.4 15.1   HCT 47.6 41.7   MCV 94.3 91.9   MCH 32.5 33.3   MCHC 34.5 36.2   RDW 13.7 13.7   NEPRELIM 5.95 3.44   WBC 7.3 5.0   .0 146.0*         Recent Labs   Lab 24  1452   TROPHS 21       Diagnostics:             Physical Exam:    Physical Exam  Cardiovascular:      Rate and Rhythm: Normal rate. Rhythm irregular.   Pulmonary:      Effort: Pulmonary effort is normal.      Breath sounds: No wheezing, rhonchi or rales.   Abdominal:      Palpations:  Abdomen is soft.      Tenderness: There is no abdominal tenderness.   Musculoskeletal:      Right lower leg: No edema.      Left lower leg: No edema.   Neurological:      Mental Status: He is alert and oriented to person, place, and time.         Medications:   valsartan  320 mg Oral Daily    aspirin  81 mg Oral Daily    metoprolol succinate  100 mg Oral 2x Daily(Beta Blocker)    spironolactone  25 mg Oral Daily      bumetanide (Bumex) 12.5 mg in 50 mL infusion      continuous dose heparin 2,200 Units/hr (05/01/24 0119)    dilTIAZem Stopped (04/30/24 1430)     LVEF: 35-40  BB:x  ACE/ARB/ARNI: x  MRNA:x  SGLT2-inhibitor:  ICD/Device:  Discharge/dry weight:  Cardiomems candidate:  Heart Failure Clinic Referral Placed:   Inpatient HF orderset: x    Assessment:    Acute HFrEF- EF 35-40%, likely tachymediated, RA 18,Pcwp 25  New Afib- HR controlled on Toprol xl , chads2vasc=3 (chf, htn), heparin gtt  New LBBB- no sig cad on C 4/30  HTN-controlled  Morbid Obesity  LENARD-on cpap    Plan:    Stop lasix and start Bumex gtt 0.5mg/hr  Stop valsartan and start entresto 24/26mg bid  Will review with physician but would rec AIKLAH guided Cardioversion   Cost check Eliquis 5mg bid and transition to DOAC  Consult cardiac rehab and nutrition for CHF education.     Debbie Marques, APRN  5/1/2024  7:24 AM  Ph [phone] ([place])  Ph [phone] ([place])      I agree with above note and plan. I seen and examined the patient. The chart was reviewed and case was d/w rounding APN. I made clinical decisions independently.    The patient admitted with worsening shortness of breath, recurrent palpitations, bloating in the abdomen, worsening leg edema with fluid overload and tachycardia.  Responded to diuretic initially but needs better diuresis.  Still fluid overloaded.    Was off his BP medication due to lack of insurance.  Patient has sleep apnea and hypertension history prior to admission but no other cardiac  disease.    Uncontrolled hypertension and new onset atrial fibrillation with fluid overload and new systolic heart failure -all diagnoses on admission.  /120 mmHg and heart rate 126 bpm on EKG in ER.    EKG upon presentation -rapid atrial fibrillation 126 bpm with left bundle branch block.    Rate is controlled and he is off Cardizem drip initiated on beta-blocker.    RHC and C with Dr. Jamison yesterday -   - Normal coronary arteries angiographically reviewing angiogram.  LVEF 40% by LV gram.  - RHC Hemodynamics consistent with still fluid overload:   RA 18  RV 46/10  PA 48/30 mean 36  PCW 24-26mmHg  CO 7.3 L/min / CI 2.7 L/min/m2 (per [place])  PVR 1.8 wood units    Physical Exam:  /106 and heart rate 70s to 80s beats per minute.    Constitutional: Normal appearance. No apparent distress.  Morbid obesity with BMI of 39.  Head and Neck: Elevated JVD  Cardiovascular: Irregularly irregular, no murmur, rub or gallop.  Respiratory: Decreased air entry with few basal crackles.  Gastrointestinal: Soft, non-tender abdomen.  Morbidly obese.  Extremities: Mild leg puffiness, pitting edema.  Peripheral pulses are 2+.  Neurologic: Alert and oriented x3.  Cranial nerves intact. Normal sensation and motor function. No focal deficits.  Musculoskeletal: normal range of motion, normal muscle strength, no joint effusion.   Integumentary: Warm and dry skin. No rashes.  Varicose veins.  Psychiatric: Depression.    Laboratory data: Potassium 3.6 sodium 140 glucose 92 creatinine 1.1 BUN 15 normal liver enzymes with elevated bilirubin 2.7.  Troponin negative and proBNP 6451.  INR 1.13 and TSH 1.4.  CBC unremarkable.  COVID-negative.    I reviewed chest x-ray images myself -mildly congested with mild cardiomegaly.  No pleural effusion seen.    Echo Doppler on admission -LVEF 35 to 40% with mild LVH and reduced RV function with some dyssynchrony of the septum with left bundle branch block.  Moderately enlarged atria with no  significant valvular abnormalities.  Aortic root 3.9 and proximal ascending aorta 4.2.  2+ MR.  No pericardial effusion.  Pulmonary pressure could not be assessed.  TAPSE 1.24 cm.      Assessment: Morbid obesity with uncontrolled hypertension and newly diagnosed rapid atrial fibrillation causing tachycardia mediated cardiomyopathy with normal coronaries angiographically.  Right heart catheterization hemodynamics consistent with fluid overload despite furosemide initiated on admission.  Normal cardiac output.  Moderate pulm hypertension congestion related.  Most likely sleep apnea with morbid obesity.  LBBB.  Varicose veins.  May have venous insufficiency with morbid obesity.      Plan:  -Change intermittent furosemide to IV Bumex drip for better diuresis and follow kidney function daily while diuresing the patient -reassess Bumex drip tomorrow since no significant fluid overload  -IV heparin to decrease risk of stroke/anticoagulation for new onset A-fib  -CPAP therapy for sleep apnea as tolerated  -Plan for cardioversion and will decide on AKILAH cardioversion versus 1 month of anticoagulation and cardioversion in 1 month -he was already told by all that he is getting AKILAH cardioversion before discharge  -Focus on CHF tune up for now  -Transition from IV heparin to Eliquis  - check Eliquis 5 mg twice daily price to make sure it is affordable especially with insurance issues -coupon may be good for 30 days but long-term  -CHF teaching  -Ambulate as able  -Metoprolol succinate -off Cardizem drip  -APN already initiated Entresto and spironolactone agree i and ARB was discontinued  -Afterload reduction and rate control  -If this is truly tachycardia mediated cardiomyopathy -long-term treatment can be different than the classic 4 pillars for heart failure and expected recovery  -Weight loss clinic after discharge    Discussed with the patient in detail  Discussed with the nursing staff    Bertram Blanco M.D.  [facility]    5/1/2024  11:11 AM  F/u3

## 2024-05-01 NOTE — PLAN OF CARE
Patient A&Ox4. On RA. Afib with BBB on tele- denies any chest pain. Reports some SOB with exertion. Continent of bowel and bladder. Rt radial site C/D/I-Soft to touch no hematoma. Rt brachial site C/D/I-soft to touch no hematoma. On Heparin gtt. Ambulates independent-tolerates well. Patient updated on plan of care. Call light within reach.   POC: Heparin gtt, IV Lasix BID.    Problem: Patient/Family Goals  Goal: Patient/Family Long Term Goal  Description: Patient's Long Term Goal: to go home    Interventions:  - labs, tele monitoring, echo, BP control, increase activity, HR control    - See additional Care Plan goals for specific interventions  Outcome: Progressing  Goal: Patient/Family Short Term Goal  Description: Patient's Short Term Goal: feel better    Interventions:   - - labs, tele monitoring, echo, BP control, increase activity, HR control    - See additional Care Plan goals for specific interventions  Outcome: Progressing     Problem: CARDIOVASCULAR - ADULT  Goal: Maintains optimal cardiac output and hemodynamic stability  Description: INTERVENTIONS:  - Monitor vital signs, rhythm, and trends  - Monitor for bleeding, hypotension and signs of decreased cardiac output  - Evaluate effectiveness of vasoactive medications to optimize hemodynamic stability  - Monitor arterial and/or venous puncture sites for bleeding and/or hematoma  - Assess quality of pulses, skin color and temperature  - Assess for signs of decreased coronary artery perfusion - ex. Angina  - Evaluate fluid balance, assess for edema, trend weights  Outcome: Progressing  Goal: Absence of cardiac arrhythmias or at baseline  Description: INTERVENTIONS:  - Continuous cardiac monitoring, monitor vital signs, obtain 12 lead EKG if indicated  - Evaluate effectiveness of antiarrhythmic and heart rate control medications as ordered  - Initiate emergency measures for life threatening arrhythmias  - Monitor electrolytes and administer replacement therapy  as ordered  Outcome: Progressing     Problem: METABOLIC/FLUID AND ELECTROLYTES - ADULT  Goal: Electrolytes maintained within normal limits  Description: INTERVENTIONS:  - Monitor labs and rhythm and assess patient for signs and symptoms of electrolyte imbalances  - Administer electrolyte replacement as ordered  - Monitor response to electrolyte replacements, including rhythm and repeat lab results as appropriate  - Fluid restriction as ordered  - Instruct patient on fluid and nutrition restrictions as appropriate  Outcome: Progressing

## 2024-05-01 NOTE — PROGRESS NOTES
Notified by primary RN-Esther pt on heparin gtt for afib since 4/28. Per RN, PTT has not been therapeutic since heparin started. Last PTT 30.3. Advised RN to give 5000 unit IV bolus and then increase dose as per protocol. Nursing communication order for [facility] to be notified if PTT continues to be subtherapeutic.

## 2024-05-01 NOTE — PLAN OF CARE
Pt is admitted for SOB. Pt is AOX4. Afebrile and denies any pain. SpO2 maintained on RA. . Denies any SOB, cough. Tele- Afib/BBB. Heparin gtt DC-ed. PO Eliquis started. PO Entresto. Cardiac control diet with 2 gr Na restriction, 2000ml fluid restriction . Denies any n/v/d. Voids. Up with SBA.  IV Bumex gtt initiated.  DW. Strict I/O. Plan for cardioversion tomorrow . NPO after midnight. Pt is updated with plan of care.         Problem: Patient/Family Goals  Goal: Patient/Family Long Term Goal  Description: Patient's Long Term Goal: to go home    Interventions:  - labs, tele monitoring, echo, BP control, increase activity, HR control    - See additional Care Plan goals for specific interventions  Outcome: Progressing  Goal: Patient/Family Short Term Goal  Description: Patient's Short Term Goal: feel better  5/1: fell better and go home     Interventions:   Bumex gtt   - - labs, tele monitoring, echo, BP control, increase activity, HR control    - See additional Care Plan goals for specific interventions  Outcome: Progressing     Problem: CARDIOVASCULAR - ADULT  Goal: Maintains optimal cardiac output and hemodynamic stability  Description: INTERVENTIONS:  - Monitor vital signs, rhythm, and trends  - Monitor for bleeding, hypotension and signs of decreased cardiac output  - Evaluate effectiveness of vasoactive medications to optimize hemodynamic stability  - Monitor arterial and/or venous puncture sites for bleeding and/or hematoma  - Assess quality of pulses, skin color and temperature  - Assess for signs of decreased coronary artery perfusion - ex. Angina  - Evaluate fluid balance, assess for edema, trend weights  Outcome: Progressing  Goal: Absence of cardiac arrhythmias or at baseline  Description: INTERVENTIONS:  - Continuous cardiac monitoring, monitor vital signs, obtain 12 lead EKG if indicated  - Evaluate effectiveness of antiarrhythmic and heart rate control medications as ordered  - Initiate emergency  measures for life threatening arrhythmias  - Monitor electrolytes and administer replacement therapy as ordered  Outcome: Progressing     Problem: METABOLIC/FLUID AND ELECTROLYTES - ADULT  Goal: Electrolytes maintained within normal limits  Description: INTERVENTIONS:  - Monitor labs and rhythm and assess patient for signs and symptoms of electrolyte imbalances  - Administer electrolyte replacement as ordered  - Monitor response to electrolyte replacements, including rhythm and repeat lab results as appropriate  - Fluid restriction as ordered  - Instruct patient on fluid and nutrition restrictions as appropriate  Outcome: Progressing

## 2024-05-01 NOTE — PAYOR COMM NOTE
For Re-Review    --------------  ADMISSION REVIEW     Payor: [facility]  Subscriber #:  [id number]  Authorization Number: [id number]    Admit date: 5/1/24  Admit time: 10:56 AM   Admit Orders (From admission, onward)       Start     Ordered    05/01/24 1057  Admit to inpatient Once  Once        Ordering Provider: Meenu Pappas DO   Question:  Diagnosis  Answer:  Atrial fibrillation with rapid ventricular response (HCC)    05/01/24 1056    04/28/24 1742  Place in observation Once  Once        Ordering Provider: Du Kahn MD   Question:  Diagnosis  Answer:  Atrial fibrillation with rapid ventricular response (HCC)    04/28/24 1741      REVIEW DOCUMENTATION:  ED Provider Notes signed by Merline Martines MD at 4/28/2024  4:51 PM    Patient Seen in: [facility] Emergency Department  History     Chief Complaint   Patient presents with    Difficulty Breathing     Stated Complaint: SOB since Friday    Subjective:   58-year-old male presents for evaluation of shortness of breath.  Patient has had shortness of breath with exertion for the past 2 days.  No chest pain.  Occasionally feels his heart racing.  All symptoms usually get better with rest.  No history of heart or lung disease.  No swelling of his legs.  No history of arrhythmia.    Objective:   Past Medical History:    Essential hypertension     Positive for stated complaint: SOB since Friday  Other systems are as noted in HPI.  Constitutional and vital signs reviewed.      All other systems reviewed and negative except as noted above.    Physical Exam     ED Triage Vitals [04/28/24 1432]   BP (!) 183/129   Pulse (!) 126   Resp 19   Temp 97.8 °F (36.6 °C)   Temp src Temporal   SpO2 94 %   O2 Device None (Room air)     Current:BP (!) 176/114   Pulse 112   Temp 97.8 °F (36.6 °C) (Temporal)   Resp 18   Ht 193 cm (6' 4\")   Wt (!) 144.7 kg   SpO2 96%   BMI 38.83 kg/m²   General: Alert, oriented, no apparent distress  HEENT: Atraumatic,  normocephalic.  Pupils equal reactive.  Extraocular motions intact.   Neck: Supple  Lungs: Clear to auscultation bilaterally.  Heart: Irregularly irregular  Abdomen: Soft, nontender.   Skin: No rash.  No edema.  Neurologic: No focal neurologic deficits.  Normal speech pattern  Musculoskeletal: No tenderness or deformity noted.  Full range of motion throughout.  ED Course     Labs Reviewed   COMP METABOLIC PANEL (14) - Abnormal; Notable for the following components:       Result Value    Glucose 116 (*)     Sodium 135 (*)     Bilirubin, Total 2.7 (*)     All other components within normal limits   PRO BETA NATRIURETIC PEPTIDE - Abnormal; Notable for the following components:    Pro-Beta Natriuretic Peptide 6,451 (*)     All other components within normal limits   CBC W/ DIFFERENTIAL - Abnormal; Notable for the following components:    Lymphocyte Absolute 0.49 (*)     All other components within normal limits   TROPONIN I HIGH SENSITIVITY - Normal   PROTHROMBIN TIME (PT) - Normal   PTT, ACTIVATED - Normal   SARS-COV-2/FLU A AND B/RSV BY PCR (GENEXPERT) - Normal    Narrative:     This test is intended for the qualitative detection and differentiation of SARS-CoV-2, influenza A, influenza B, and respiratory syncytial virus (RSV) viral RNA in nasopharyngeal or nares swabs from individuals suspected of respiratory viral infection consistent with COVID-19 by their healthcare provider. Signs and symptoms of respiratory viral infection due to SARS-CoV-2, influenza, and RSV can be similar.    Test performed using the Xpert Xpress SARS-CoV-2/FLU/RSV (real time RT-PCR)  assay on the GeneXpert instrument, [employer], [place] [address].   This test is being used under the Food and Drug Administration's Emergency Use Authorization.    The authorized Fact Sheet for Healthcare Providers for this assay is available upon request from the laboratory.   CBC WITH DIFFERENTIAL WITH PLATELET    Narrative:     The following orders were  created for panel order CBC With Differential With Platelet.  Procedure                               Abnormality         Status                     ---------                               -----------         ------                     CBC W/ DIFFERENTIAL[[id number]]          Abnormal            Final result                 Please view results for these tests on the individual orders.   EKG    Rate, intervals and axes as noted on EKG Report.  Rate: 126  Rhythm: Atrial Fibrillation  Reading: Left bundle branch block pattern is new versus 28  MDM      Differential diagnosis includes arrhythmia, pulmonary edema, ACS, metabolic    Admission disposition: 4/28/2024  4:51 PM    Medications   dilTIAZem 10 mg BOLUS FROM BAG infusion (has no administration in time range)   dilTIAZem (cardIZEM) 100 mg in sodium chloride 0.9% 100 mL IVPB-ADDV (5 mg/hr Intravenous New Bag 4/28/24 1459)   heparin (Porcine) 1000 UNIT/ML injection - BOLUS IV 5,000 Units (has no administration in time range)   heparin (Porcine) 28183 units/250mL infusion ED INITIAL DOSE (has no administration in time range)   heparin (Porcine) 50379 units/250mL infusion ACS/AFIB CONTINUOUS (has no administration in time range)   furosemide (Lasix) 10 mg/mL injection 40 mg (40 mg Intravenous Given 4/28/24 1544)   losartan (Cozaar) tab 50 mg (50 mg Oral Given 4/28/24 1633)       Chemistry unremarkable, normal electrolytes.    BNP elevated.    Troponin normal      I have independently visualized the radiology images, my focus/limited interpretation: Pulmonary edema, no focal consolidation    Defer to radiologist for other/incidental findings       Discussed with Wilbert LAW.  He would also add half dose of losartan for blood pressure control.  Continue to titrate diltiazem     A total of 45 minutes of critical care time (exclusive of billable procedures) was administered to manage the patient's unstable vital signs due to his atrial fibrillation with rapid ventricular  response.  This involved direct patient intervention, complex decision making, and/or extensive discussions with the patient, family, and clinical staff.        Disposition and Plan     Clinical Impression:  1. Atrial fibrillation with rapid ventricular response (HCC)    2. Acute congestive heart failure, unspecified heart failure type (HCC)         Disposition:  Admit  4/28/2024  4:51 pm    Hospital Problems       Present on Admission  Date Reviewed: 4/28/2024            ICD-10-CM Noted POA    * (Principal) Atrial fibrillation with rapid ventricular response (HCC) I48.91 4/28/2024 Unknown               4/28 H&P  Chief Complaint   Patient presents with    Difficulty Breathing     Subjective:  History of Present Illness:      Mitesh Flood is a 58 year old male with a past medical history of hypertension.  He is noncompliant with his medications.  He states that he occasionally has palpitations.  He comes the emergency room now secondary to shortness of breath which has been ongoing the past 2 days.  In the emergency room he was noted to have rapid atrial fibrillation and elevated blood pressure.  He was started on Cardizem drip and heparin drip.  Assessment & Plan:  #Rapid a. Fib  Echo  Tsh  Cardizem drip, wean as able  Heparin drip, transition to OAC soon  Start toprol 50 BID     #? HF  Echo  Could be pulm edema from rapid a. Fib  Diurese     #HTN  Toprol  Will concentrate on rate control and start ARB when needed     #morbid obesity Body mass index is 39.96 kg/m².           4/28 Cardiology  Reason for Consultation:  afib     History of Present Illness:  Mitesh Flood is a a(n) 58 year old male. Presents with SERVIN the past 2 days - mild edema and sense of abdominal bloating     In atrial fibrillation with new LBBB     O prior history of heart disease - primarily HTN in the family - off his own antihypertensive medications secondary to lack of insurance     Uses CPAP for LENARD. Overweight      Pro-bnp  [id number]     Troponin normal      CHF secondary to atrial fibrillation - duration unknown.     Rate control/anticoagulation     Diuresis     Echocardiogram          4/29 Cardiology  Already feels a bit better with breathing     Heart rates still up but some improvement overnight     Afebrile  Hypertensive     Lungs clear  Ht irregular  Abd soft  Ext less edema  Neuro intact     Good urine output     K+ 3.4     Echo pending        A/P: Newly recognized symptomatic afib with LBBB and rapid rates - duration unknown - clinically recent     Rate control     Anticoagulation - heparin for now     Diuresis     If LV dysfunction identified by echo will require R/L heart cath          4/29 IM  Vital signs:  Temp:  [97.8 °F (36.6 °C)-98.5 °F (36.9 °C)] 98.5 °F (36.9 °C)  Pulse:  [] 92  Resp:  [16-20] 20  BP: (114-186)/() 152/95  SpO2:  [87 %-97 %] 94 %     Physical Exam:    General: No acute distress  Respiratory: Clear to auscultation bilaterally  Cardiovascular: S1, S2. IR IR  Abdomen: Soft  Neuro: No new focal deficits        Diagnostic Data:    Labs:       Recent Labs   Lab 04/28/24  1452 04/29/24  0547   WBC 7.3 5.0   HGB 16.4 15.1   MCV 94.3 91.9   .0 146.0*   INR 1.13  --               Recent Labs   Lab 04/28/24  1452 04/29/24  0547   * 96   BUN 10 12   CREATSERUM 1.05 1.04   CA 9.1 8.5   ALB 4.0  --    * 139   K 3.8 3.4*  3.4*    107   CO2 21.0 25.0   ALKPHO 96  --    AST 33  --    ALT 48  --    BILT 2.7*  --    TP 7.9  --      Medications:   Scheduled Medications    potassium chloride  40 mEq Oral Q4H    losartan  100 mg Oral Daily    metoprolol succinate  100 mg Oral 2x Daily(Beta Blocker)    digoxin  250 mcg Intravenous Once    spironolactone  25 mg Oral Daily    furosemide  40 mg Intravenous BID (Diuretic)                  Assessment & Plan:  #Rapid a. Fib  Echo pending  TSH WNL  Cardizem drip, wean as able  Heparin drip, transition to OAC soon  Cont.  toprol 50 BID      #CHF  Echo  Diurese  Started on aldactone     #HTN  Toprol  Add ARB     #Hypokalemia  Replace and monitor     #morbid obesity Body mass index is 39.96 kg/m².     Dispo: as above.  Discussed with cards. Await echo.  Likely will need cath.  Cont. To diurese.  On heparin drip, monitoring drug levels              4/30 IM  Chief Complaint: dyspnea        Subjective:  Patient feels well.  HR better.     Vital signs:  Temp:  [97.5 °F (36.4 °C)-98.6 °F (37 °C)] 97.5 °F (36.4 °C)  Pulse:  [] 79  Resp:  [18-20] 20  BP: (150-169)/() 150/98  SpO2:  [91 %-98 %] 95 %     Physical Exam:    General: No acute distress  Respiratory: Clear to auscultation bilaterally  Cardiovascular: S1, S2. IR IR  Abdomen: Soft  Neuro: No new focal deficits     Scheduled Medications    valsartan  320 mg Oral Daily    metoprolol succinate  100 mg Oral 2x Daily(Beta Blocker)    spironolactone  25 mg Oral Daily    amLODIPine  5 mg Oral Daily    furosemide  40 mg Intravenous BID (Diuretic)                  Assessment & Plan:  #Rapid a. Fib  Echo noted  TSH WNL  Off cardizem  Cont. Toprol 100 BID  Heparin drip, transition to OAC soon     #Acute HFrEF EF: 35-40%  Possibly rate related  Echo noted  Diurese  ASA/BB  LDL noted, statin depending on cath. Dec EF possibly rate related  Will need cath     #HTN  BP remains elevated  Losartan -> diovan, will dec EF could consider entresto  On toprol, norvasc, aldactone  Send renin/matt and plasma metanephrines     #Hypokalemia  Replace and monitor     #morbid obesity Body mass index is 39.96 kg/m².     #LENARD  CPAP     Dispo: as above.  On heparin drip.  EF decreased, will need cath.  BP remains significantly elevated           4/30 Cardiology  Assessment:  Afib, with RVR   Acute HFrEF, LVEF 35-40%   Newly noted LBBB  HTN  HLD        Plan:  Drop in LVEF may be related to tachy induced cardiomyopathy, however, given LBBB, would recommend Right and left heart catheterization for further eval   Cont lasix  40 IV bid with dose adjustment based on RHC  Cont heparin gtt with eventual transition to DOAC  Cont BB   BP elevated, agree with diovan. Will optimize GDMT post-cath.   Will cont to follow   Patient was consented for left and right heart cardiac catheterization. The risks and benefits of the procedure were explained to the patient. 1-2% risk of coronary angiography, possible angioplasty, include, but are not limited to stroke, death, heart attack, coronary dissection requiring emergent CABG, hematoma, bleeding, allergic reaction to medications, vascular damage requiring surgical repair, kidney failure requiring dialysis and others. Patient wishes to proceed.             4/30 Cardiac Catheterization Note     Procedure Performed: [facility]  Date of Procedure: 4/30/2024   Indication: HFrEF, CHF  Pre Operative Diagnosis: CHF  Post Operative Diagnosis: CHF  Estimated blood loss: 10cc  Specimens: None    A/P:  No significant CAD angiographically. Right dominant circulation  LVEF 40%, LVEDP 22  Mean PAP 36  Normal CO  Medical management          5/1 Cardiology  Assessment:     Acute HFrEF- EF 35-40%, likely tachymediated, RA 18,Pcwp 25  New Afib- HR controlled on Toprol xl , chads2vasc=3 (chf, htn), heparin gtt  New LBBB- no sig cad on C 4/30  HTN-controlled  Morbid Obesity  LENARD-on cpap     Plan:     Stop lasix and start Bumex gtt 0.5mg/hr  Stop valsartan and start entresto 24/26mg bid  Will review with physician but would rec AKILAH guided Cardioversion   Cost check Eliquis 5mg bid and transition to DOAC if affordable.   Consult cardiac rehab and nutrition for CHF education.         5/1 IM  Chief Complaint: dyspnea        Subjective:  Patient  no new issues tolerated procedures well- needing education on plan of care            Objective:  Review of Systems:   ROS completed; pertinent positive and negatives stated in subjective.     Vital signs:  Temp:  [97.6 °F (36.4 °C)-98.2 °F (36.8 °C)] 98.2 °F (36.8 °C)  Pulse:   [73-87] 82  Resp:  [19-22] 22  BP: (133-156)/() 140/106  SpO2:  [89 %-98 %] 96 %     Physical Exam:    General: No acute distress  AO   Respiratory: Clear to auscultation bilaterally  Cardiovascular: S1, S2. IR IR af   Abdomen: Soft nt + BS obese   Neuro: No new focal deficits  Extremities No edema  RUE w/ sl redness from IV site, r radial and brachial pulses + , sites d/I, +CMS      Diagnostic Data:    Labs:       Recent Labs   Lab 04/28/24  1452 04/29/24  0547   WBC 7.3 5.0   HGB 16.4 15.1   MCV 94.3 91.9   .0 146.0*   INR 1.13  --                 Recent Labs   Lab 04/28/24  1452 04/29/24  0547 04/29/24  1613 04/30/24  0655   * 96  --  92   BUN 10 12  --  15   CREATSERUM 1.05 1.04  --  1.10   CA 9.1 8.5  --  9.2   ALB 4.0  --   --   --    * 139  --  140   K 3.8 3.4*  3.4* 3.7 3.7  3.7    107  --  107   CO2 21.0 25.0  --  29.0   ALKPHO 96  --   --   --    AST 33  --   --   --    ALT 48  --   --   --    BILT 2.7*  --   --   --    TP 7.9  --   --   --          Estimated Creatinine Clearance: 88.8 mL/min (based on SCr of 1.1 mg/dL).          Recent Labs   Lab 04/28/24  1452   TROPHS 21             Recent Labs   Lab 04/28/24  1452   PTP 14.5   INR 1.13         Imaging: Imaging data reviewed in Epic.     Medications:   Scheduled Medications    sacubitril-valsartan  1 tablet Oral BID    aspirin  81 mg Oral Daily    metoprolol succinate  100 mg Oral 2x Daily(Beta Blocker)    spironolactone  25 mg Oral Daily                  Assessment & Plan:  #Rapid a. Fib  Off  cardizem gtt  rates better   - heaprin gtt  mostly not therap. In dosing   Need eliquis 5 mg BID pricing  social work consult - will order med   - cards considering AKILAH/ DCCV   Cont. Toprol 100 BID  Heparin drip, transition to DOAC soon     #Acute HFrEF EF: 35-40% related to tachycardia  D/p R/LHC  No CAD, elevated PAP pressures, normal CO   Start bumex gtt  PA pressures elevated on RHC   Aldactone   ASA/BB  Stop valsarten/ start  entresto   Fluid restriction, I&o's   BMP daily   Education   Fluid restriction , daily wts         #HTN  BP remains elevated   Starting  entresto  On toprol,  Send renin/matt and plasma metanephrines P      #Hypokalemia  Replace and monitor     #morbid obesity Body mass index is 39.96 kg/m².     #LENARD  CPAP     Dispo: as above.    Education   Warm packs r UE  AKILAH/ DCCV when more fluid removed   Check pricing for eliquis           MEDICATIONS ADMINISTERED IN LAST 1 DAY:  aspirin DR tab 81 mg       Date Action Dose Route User    5/1/2024 0856 Given 81 mg Oral Karl Pate RN          bumetanide (Bumex) 12.5 mg in 50 mL infusion       Date Action Dose Route User    5/1/2024 1030 New Bag 0.5 mg/hr Intravenous Karl Pate RN          heparin (Porcine) 26965 units/250mL infusion ACS/AFIB CONTINUOUS       Date Action Dose Route User    5/1/2024 0831 Hi-Risk Rate/Dose Change 2,400 Units/hr Intravenous Karl Pate RN    5/1/2024 0119 New Bag 2,200 Units/hr Intravenous Esther Payne RN    4/30/2024 1829 Hi-Risk Restarted 2,000 Units/hr Intravenous Kirsten Fuentes RN          furosemide (Lasix) 10 mg/mL injection 40 mg       Date Action Dose Route User    4/30/2024 1734 Given 40 mg Intravenous Kirsten Fuentes RN          heparin (Porcine) 1000 UNIT/ML injection 5,000 Units       Date Action Dose Route User    5/1/2024 0119 Given 5,000 Units Intravenous Esther Payne RN          iohexol (OMNIPAQUE) 350 MG/ML injection 100 mL       Date Action Dose Route User    4/30/2024 1554 Given 60 mL Injection Fiordaliza Jamison MD          metoprolol succinate ER (Toprol XL) 24 hr tab 100 mg       Date Action Dose Route User    5/1/2024 0515 Given 100 mg Oral Esther Payne RN    4/30/2024 1734 Given 100 mg Oral Kirsten Fuentes RN          sacubitril-valsartan (Entresto) 24-26 MG per tab 1 tablet       Date Action Dose Route User    5/1/2024 0857 Given 1 tablet Oral Karl Pate, MARK          spironolactone  (Aldactone) tab 25 mg       Date Action Dose Route User    5/1/2024 1029 Given 25 mg Oral Karl Pate RN            Vitals (last day)       Date/Time Temp Pulse Resp BP SpO2 Weight O2 Device O2 Flow Rate (L/min) [facility]    05/01/24 0806 97.6 °F (36.4 °C) 77 20 148/106 97 % -- None (Room air) -- NN    05/01/24 0515 98.2 °F (36.8 °C) 82 22 140/106 96 % 320 lb 5.3 oz Nasal cannula 2 L/min AM    04/30/24 2358 98 °F (36.7 °C) 77 20 146/107 96 % -- CPAP 3 L/min TB    04/30/24 1915 97.9 °F (36.6 °C) -- -- -- -- -- -- -- TB    04/30/24 1915 -- 87 -- 133/98 89 % -- None (Room air) -- GD    04/30/24 1815 -- 78 -- 138/99 95 % -- None (Room air) -- GD    04/30/24 1730 -- 74 -- 139/85 94 % -- None (Room air) -- GD    04/30/24 1700 -- 73 -- 156/114 94 % -- None (Room air) -- GD    04/30/24 1645 -- 78 -- 144/96 94 % -- None (Room air) -- GD    04/30/24 1630 -- 80 -- 147/112 92 % -- None (Room air) -- GD    04/30/24 1622 97.7 °F (36.5 °C) 79 20 134/108 92 % -- None (Room air) -- NN    04/30/24 1243 97.8 °F (36.6 °C) 86 20 149/113 96 % -- None (Room air) -- NN    04/30/24 1120 -- 86 -- -- 97 % -- -- -- NN    04/30/24 0815 97.6 °F (36.4 °C) 76 19 141/112 98 % -- None (Room air) -- NN    04/30/24 0442 97.5 °F (36.4 °C) 79 20 150/98 95 % 321 lb 6.9 oz None (Room air) -- AT    04/30/24 0152 -- 86 -- 155/112 92 % -- None (Room air) -- HM

## 2024-05-02 ENCOUNTER — APPOINTMENT (OUTPATIENT)
Dept: CV DIAGNOSTICS | Facility: HOSPITAL | Age: 59
End: 2024-05-02
Attending: NURSE PRACTITIONER
Payer: COMMERCIAL

## 2024-05-02 ENCOUNTER — APPOINTMENT (OUTPATIENT)
Dept: INTERVENTIONAL RADIOLOGY/VASCULAR | Facility: HOSPITAL | Age: 59
End: 2024-05-02
Attending: NURSE PRACTITIONER
Payer: COMMERCIAL

## 2024-05-02 VITALS
OXYGEN SATURATION: 94 % | WEIGHT: 307.13 LBS | SYSTOLIC BLOOD PRESSURE: 137 MMHG | HEIGHT: 76 IN | DIASTOLIC BLOOD PRESSURE: 98 MMHG | BODY MASS INDEX: 37.4 KG/M2 | TEMPERATURE: 98 F | RESPIRATION RATE: 18 BRPM | HEART RATE: 73 BPM

## 2024-05-02 LAB
ANION GAP SERPL CALC-SCNC: 9 MMOL/L (ref 0–18)
BUN BLD-MCNC: 16 MG/DL (ref 9–23)
CALCIUM BLD-MCNC: 9.6 MG/DL (ref 8.5–10.1)
CHLORIDE SERPL-SCNC: 100 MMOL/L (ref 98–112)
CO2 SERPL-SCNC: 26 MMOL/L (ref 21–32)
CREAT BLD-MCNC: 1.23 MG/DL
EGFRCR SERPLBLD CKD-EPI 2021: 68 ML/MIN/1.73M2 (ref 60–?)
GLUCOSE BLD-MCNC: 103 MG/DL (ref 70–99)
OSMOLALITY SERPL CALC.SUM OF ELEC: 281 MOSM/KG (ref 275–295)
POTASSIUM SERPL-SCNC: 3.1 MMOL/L (ref 3.5–5.1)
SODIUM SERPL-SCNC: 135 MMOL/L (ref 136–145)

## 2024-05-02 PROCEDURE — 93325 DOPPLER ECHO COLOR FLOW MAPG: CPT | Performed by: NURSE PRACTITIONER

## 2024-05-02 PROCEDURE — 99239 HOSP IP/OBS DSCHRG MGMT >30: CPT | Performed by: INTERNAL MEDICINE

## 2024-05-02 PROCEDURE — B246ZZ4 ULTRASONOGRAPHY OF RIGHT AND LEFT HEART, TRANSESOPHAGEAL: ICD-10-PCS | Performed by: INTERNAL MEDICINE

## 2024-05-02 PROCEDURE — 5A2204Z RESTORATION OF CARDIAC RHYTHM, SINGLE: ICD-10-PCS | Performed by: INTERNAL MEDICINE

## 2024-05-02 PROCEDURE — 93320 DOPPLER ECHO COMPLETE: CPT | Performed by: NURSE PRACTITIONER

## 2024-05-02 RX ORDER — TORSEMIDE 20 MG/1
20 TABLET ORAL DAILY
Status: DISCONTINUED | OUTPATIENT
Start: 2024-05-02 | End: 2024-05-02

## 2024-05-02 RX ORDER — TORSEMIDE 20 MG/1
20 TABLET ORAL DAILY
Qty: 30 TABLET | Refills: 6 | Status: SHIPPED | OUTPATIENT
Start: 2024-05-03

## 2024-05-02 RX ORDER — METOPROLOL SUCCINATE 100 MG/1
100 TABLET, EXTENDED RELEASE ORAL
Qty: 60 TABLET | Refills: 6 | Status: SHIPPED | OUTPATIENT
Start: 2024-05-02

## 2024-05-02 RX ORDER — TORSEMIDE 20 MG/1
20 TABLET ORAL
Status: DISCONTINUED | OUTPATIENT
Start: 2024-05-03 | End: 2024-05-02

## 2024-05-02 RX ORDER — SODIUM CHLORIDE 9 MG/ML
INJECTION, SOLUTION INTRAVENOUS CONTINUOUS
Status: DISCONTINUED | OUTPATIENT
Start: 2024-05-02 | End: 2024-05-02

## 2024-05-02 RX ORDER — POTASSIUM CHLORIDE 20 MEQ/1
40 TABLET, EXTENDED RELEASE ORAL ONCE
Status: DISCONTINUED | OUTPATIENT
Start: 2024-05-02 | End: 2024-05-02

## 2024-05-02 RX ORDER — METHOHEXITAL IN WATER/PF 100MG/10ML
100 SYRINGE (ML) INTRAVENOUS ONCE
Status: DISCONTINUED | OUTPATIENT
Start: 2024-05-02 | End: 2024-05-02 | Stop reason: HOSPADM

## 2024-05-02 RX ORDER — SODIUM CHLORIDE 9 MG/ML
INJECTION, SOLUTION INTRAVENOUS
Status: COMPLETED | OUTPATIENT
Start: 2024-05-02 | End: 2024-05-02

## 2024-05-02 RX ORDER — SPIRONOLACTONE 25 MG/1
25 TABLET ORAL DAILY
Qty: 30 TABLET | Refills: 6 | Status: SHIPPED | OUTPATIENT
Start: 2024-05-03

## 2024-05-02 RX ORDER — MIDAZOLAM HYDROCHLORIDE 1 MG/ML
INJECTION INTRAMUSCULAR; INTRAVENOUS
Status: COMPLETED
Start: 2024-05-02 | End: 2024-05-02

## 2024-05-02 RX ORDER — POTASSIUM CHLORIDE 20 MEQ/1
40 TABLET, EXTENDED RELEASE ORAL EVERY 4 HOURS
Status: COMPLETED | OUTPATIENT
Start: 2024-05-02 | End: 2024-05-02

## 2024-05-02 NOTE — CM/SW NOTE
Received duplicate SW order to price check medications. Medications were price checked yesterday and it is documented by SW. Order acknowledged.    DEANNA Dugan, LSW  Discharge Planner  [phone]

## 2024-05-02 NOTE — PROGRESS NOTES
AKILAH/CV at bedside with Dr Colón. Pt successfully cardioverted to NSR confirmed by EKG.Pt tolerated procedure well. See bedside sedation record.  Neuro intact. BEAVER well. Pt tolerated po intake well. Report called to Derrick FLORES. Pt transported back to room in stable condition without c/o.

## 2024-05-02 NOTE — PROCEDURES
Cardiology Transesophageal Echo Note    PRE-PROCEDURE DIAGNOSIS:  New onset a-fib    PROCEDURE: Transesophageal Echocardiogram.    SEDATION:   Topical spray x 1  Versed: 5 mg  Fentanyl: 125 mcg  I personally supervised the intravenous administration of conscious sedation.  This was performed with continuous respiratory, hemodynamic, and electrocardiographic monitoring.  Sedation was supervised from 12:40 PM - 1:05 PM, a total of 25 minutes.    DESCRIPTION:   Informed consent was obtained after risks and benefits of the procedure were explained. Oral hurricaine spray was placed in the oropharynx. Conscious sedation was given.  After adequate anesthesia, the AKILAH probe was placed in the oropharynx with the esophagus being successfully intubated. Standard transgastric and multiplane views were obtained and available findings are as follows:    COMPLICATIONS: none    FINDINGS:   Moderately reduced LV systolic function with estimated LVEF 35% with global hypokinesis and dyssynergy of the septal wall due to LBBB. Grossly RV normal size and systolic function.  Left atrial enlargement  Normal mitral, tricuspid and pulmonic valves were seen.   There was a trileaflet aortic valve without stenosis and trivial insufficiency.   Velocities of average 60 cm/s were seen in the TATA. There was no evidence for intracardiac mass or thrombus in the TATA.  There was no evidence for reversal of flow in the pulmonary veins.   A color Doppler flow interrogation of the intra-atrial septum was performed and there was no intracardiac shunting to suggest an ASD or PFO.  There was minimal atherosclerotic plaque in the visualized aorta without evidence for mobile atheromata or thrombus.  No pericardial effusion.    Arsen Colón MD  5/2/2024  1:38 PM

## 2024-05-02 NOTE — PROGRESS NOTES
Brief Note:    Patient seen and examined    #Afib Successful AKILAH/cardioversion earlier today and now in normal sinus rhythm  -Continued on Metoprolol/Eliquis  #Acute HfrEF Also while diuresed and appearing euvolemic without any lower extremity edema  -GDMT per cardiology, Entresto/Aldactone/Torsemide    Medically stable and okay for discharge home today  Discharge planning time spent 33 minutes  Further documentation to follow

## 2024-05-02 NOTE — PLAN OF CARE
Received patient at 0730. Alert and Oriented x4. Tele Rhythm A fib. Pt on RA. CPAP at night. Breath sounds clear. Bed is locked and in low position. Call light and personal items within reach. Pt voiding with no issue using urinal. Pt ambulated in room without issue. K replaced this am. Consent completed and in chart. Bumex gtt infusing. R wrist and upper arm cdi, soft. NPO. Reviewed plan of care and patient verbalizes understanding.     Plan:  AKILAH and cardioversion    Problem: Patient/Family Goals  Goal: Patient/Family Long Term Goal  Description: Patient's Long Term Goal: to go home    Interventions:  - labs, tele monitoring, echo, BP control, increase activity, HR control    - See additional Care Plan goals for specific interventions  Outcome: Progressing  Goal: Patient/Family Short Term Goal  Description: Patient's Short Term Goal: feel better  5/1: fell better and go home     Interventions:   Bumex gtt   - - labs, tele monitoring, echo, BP control, increase activity, HR control    - See additional Care Plan goals for specific interventions  Outcome: Progressing     Problem: CARDIOVASCULAR - ADULT  Goal: Maintains optimal cardiac output and hemodynamic stability  Description: INTERVENTIONS:  - Monitor vital signs, rhythm, and trends  - Monitor for bleeding, hypotension and signs of decreased cardiac output  - Evaluate effectiveness of vasoactive medications to optimize hemodynamic stability  - Monitor arterial and/or venous puncture sites for bleeding and/or hematoma  - Assess quality of pulses, skin color and temperature  - Assess for signs of decreased coronary artery perfusion - ex. Angina  - Evaluate fluid balance, assess for edema, trend weights  Outcome: Progressing  Goal: Absence of cardiac arrhythmias or at baseline  Description: INTERVENTIONS:  - Continuous cardiac monitoring, monitor vital signs, obtain 12 lead EKG if indicated  - Evaluate effectiveness of antiarrhythmic and heart rate control  medications as ordered  - Initiate emergency measures for life threatening arrhythmias  - Monitor electrolytes and administer replacement therapy as ordered  Outcome: Progressing

## 2024-05-02 NOTE — PLAN OF CARE
Patient A&Ox4. On RA during the day CPAP at night. Afib on tele-denies any chest pain Some SOB with exertion. Continent of bowel and bladder. Ambulates independently-tolerates well. On Bumex gtt-with good output. Patient updated on plan of care. Call light within reach.     Problem: Patient/Family Goals  Goal: Patient/Family Long Term Goal  Description: Patient's Long Term Goal: to go home    Interventions:  - labs, tele monitoring, echo, BP control, increase activity, HR control    - See additional Care Plan goals for specific interventions  Outcome: Progressing  Goal: Patient/Family Short Term Goal  Description: Patient's Short Term Goal: feel better  5/1: fell better and go home     Interventions:   Bumex gtt   - - labs, tele monitoring, echo, BP control, increase activity, HR control    - See additional Care Plan goals for specific interventions  Outcome: Progressing     Problem: CARDIOVASCULAR - ADULT  Goal: Maintains optimal cardiac output and hemodynamic stability  Description: INTERVENTIONS:  - Monitor vital signs, rhythm, and trends  - Monitor for bleeding, hypotension and signs of decreased cardiac output  - Evaluate effectiveness of vasoactive medications to optimize hemodynamic stability  - Monitor arterial and/or venous puncture sites for bleeding and/or hematoma  - Assess quality of pulses, skin color and temperature  - Assess for signs of decreased coronary artery perfusion - ex. Angina  - Evaluate fluid balance, assess for edema, trend weights  Outcome: Progressing  Goal: Absence of cardiac arrhythmias or at baseline  Description: INTERVENTIONS:  - Continuous cardiac monitoring, monitor vital signs, obtain 12 lead EKG if indicated  - Evaluate effectiveness of antiarrhythmic and heart rate control medications as ordered  - Initiate emergency measures for life threatening arrhythmias  - Monitor electrolytes and administer replacement therapy as ordered  Outcome: Progressing     Problem: METABOLIC/FLUID  AND ELECTROLYTES - ADULT  Goal: Electrolytes maintained within normal limits  Description: INTERVENTIONS:  - Monitor labs and rhythm and assess patient for signs and symptoms of electrolyte imbalances  - Administer electrolyte replacement as ordered  - Monitor response to electrolyte replacements, including rhythm and repeat lab results as appropriate  - Fluid restriction as ordered  - Instruct patient on fluid and nutrition restrictions as appropriate  Outcome: Progressing

## 2024-05-02 NOTE — PLAN OF CARE
Pt is Ok to discharge per primary and consults. Discharge instructions including medications and follow ups given and patient and wife verbalize understanding. IV removed, tele monitor discontinued. All belongings taken with pt/ Pt transported off unit via wheelchair.

## 2024-05-02 NOTE — PROGRESS NOTES
[facility] Cardiology Progress Note    Mitesh Flood Patient Status:  Inpatient    1965 MRN [id number]   Location [facility] 8NE-A Attending Meenu Pappas,    Hosp Day # 1 PCP Shara Vance MD     Subjective:  No acute events overnight  Notes improved abdominal bloating and SERVIN    Objective:  /82 (BP Location: Left arm)   Pulse 97   Temp 98.3 °F (36.8 °C) (Oral)   Resp 18   Ht 6' 4\" (1.93 m)   Wt (!) 307 lb 1.6 oz (139.3 kg)   SpO2 92%   BMI 37.38 kg/m²     Telemetry: Afib with LBBB      Intake/Output:    Intake/Output Summary (Last 24 hours) at 2024 0927  Last data filed at 2024 0800  Gross per 24 hour   Intake 840 ml   Output 7500 ml   Net -6660 ml       Last 3 Weights   24 0827 (!) 307 lb 1.6 oz (139.3 kg)   24 0515 (!) 320 lb 5.3 oz (145.3 kg)   24 0442 (!) 321 lb 6.9 oz (145.8 kg)   24 0425 (!) 327 lb 6.1 oz (148.5 kg)   24 1822 (!) 328 lb 4.2 oz (148.9 kg)   24 1757 (!) 328 lb 4.2 oz (148.9 kg)   24 1432 (!) 319 lb (144.7 kg)   24 1330 (!) 319 lb (144.7 kg)   23 0812 (!) 312 lb (141.5 kg)       Labs:  Recent Labs   Lab 24  0655 24  1326 24  0455   GLU 92 107* 103*   BUN 15 14 16   CREATSERUM 1.10 1.02 1.23   EGFRCR 77 85 68   CA 9.2 9.9 9.6    133* 135*   K 3.7  3.7 4.0 3.1*    103 100   CO2 29.0 26.0 26.0     Recent Labs   Lab 24  1452 24  0547   RBC 5.05 4.54   HGB 16.4 15.1   HCT 47.6 41.7   MCV 94.3 91.9   MCH 32.5 33.3   MCHC 34.5 36.2   RDW 13.7 13.7   NEPRELIM 5.95 3.44   WBC 7.3 5.0   .0 146.0*         Recent Labs   Lab 24  1452   TROPHS 21       Diagnostics:             Physical Exam:    Physical Exam  Constitutional:       Appearance: He is obese.   Cardiovascular:      Rate and Rhythm: Normal rate. Rhythm irregular.   Pulmonary:      Effort: Pulmonary effort is normal.      Breath sounds: No rales.   Abdominal:      Palpations: Abdomen is  soft.   Musculoskeletal:      Right lower leg: No edema.      Left lower leg: No edema.   Skin:     General: Skin is warm and dry.   Neurological:      Mental Status: He is alert and oriented to person, place, and time.         Medications:   benzocaine  1 spray Mouth/Throat See Admin Instructions    potassium chloride  40 mEq Oral Q4H    [START ON 5/3/2024] torsemide  20 mg Oral BID (Diuretic)    sacubitril-valsartan  1 tablet Oral BID    apixaban  5 mg Oral BID    aspirin  81 mg Oral Daily    metoprolol succinate  100 mg Oral 2x Daily(Beta Blocker)    spironolactone  25 mg Oral Daily         LVEF:35-40  BB:x  ACE/ARB/ARNI: x  MRNA:x  SGLT2-inhibitor:  ICD/Device:  Discharge/dry weight: 307lb  Cardiomems candidate:   Heart Failure Clinic Referral Placed: no  Inpatient HF orderset: x    Assessment:    Acute HFrEF- EF 35-40%, likely tachymediated, down 8L with bumex gtt ( RA 18 and PCWP 25 4/30)  New Afib- HR controlled on Toprol xl , chads2vasc=3 (chf, htn),new on Eliquis, AKILAH guided DCCV today   Hyponatremia- hypervolemic, with diuretics  New LBBB- no sig cad on C 4/30  HTN-controlled  Morbid Obesity-    A1c 4.9  LENARD-compliant with cpap    Plan:    Good diuresis with bumex, was sluggish with IV laisx 40mg bid.  Will dc on Torsemide 20mg daily  Continue torpol, entresto, and aldactone.  Will hold off on initiatin of SGLT-2 inhibitor given suspected tachy mediated cardiomyoapthy and the fact that he is not Diabetic.  If Fluid mgmt remains an issue despite rhythm control can consider outpatient initiation  May be able to dc after AKILAH guided DCCV today, will follow   Office follow up AFIB clinic for chf assessment and rhythm management.     Debbie Marques, APRN  5/2/2024  6:58 AM  Ph [phone] ([place])  Ph [phone] ([place])        =======================================================  Patient seen and examined independently.  Note reviewed and labs reviewed.  Agree with above assessment and  plan.    Physical exam:  GEN: Alert and orient, no acute distress  CV: Irregular, irregular, S1S2, no m/g/r  LUNGS: CTA b/l with no obvious wheezing, rales or rhonchi  EXT: no b/l LE edema  ABD: soft, NTND    I have personally performed the medical decision making in its entirety. My additions include:  Will proceed with AKILAH/DCCV later today.    D/w SANDIN Jerald and patient.    Arsen Colón MD

## 2024-05-02 NOTE — PROCEDURES
[facility]  Cardioversion Note    Mitesh VILLALTA [facility] Location:Cath Lab Suites   [facility] [id number] MRN [id number]   Admission Date 4/28/2024 Procedure Date 5/2/2024   Attending Physician Meenu Pappas DO Procedure Physician Arsen Colón MD     Pre-Operative Diagnosis: New onset a-fib    Post-Operative Diagnosis: Same as above    Procedure(s) Performed:    1.    Cardioversion.  2.    Sedation     Indication:  New onset a-fib    Anesthesia: IV Sedation with Brevital    Complications: None    Summary of Case: The patient was brought to the cardiac interventional suites in a fasting and non-sedated state after providing informed consent.  IV sedation was administered during continuous ECG, pulse oximeter and non-invasive hemodynamic monitoring from 1:07 PM to 1:15 PM.  After administering a total of 30 mg of IV Brevital in intermittent boluses, the desired level of sedation was achieved.  A single 360-joule synchronized biphasic shock was delivered with successful conversion to sinus rhythm. The patient remained on telemetry until fully recovered.  There were no complications.     Conclusion:  1.    Successful cardioversion.    Arsen Colón MD  5/2/2024  1:49 PM

## 2024-05-03 ENCOUNTER — PATIENT OUTREACH (OUTPATIENT)
Dept: CASE MANAGEMENT | Age: 59
End: 2024-05-03

## 2024-05-03 DIAGNOSIS — I50.9 ACUTE CONGESTIVE HEART FAILURE, UNSPECIFIED HEART FAILURE TYPE (HCC): Primary | ICD-10-CM

## 2024-05-03 DIAGNOSIS — Z02.9 ENCOUNTERS FOR ADMINISTRATIVE PURPOSE: ICD-10-CM

## 2024-05-03 LAB
ATRIAL RATE: 68 BPM
P AXIS: 31 DEGREES
P-R INTERVAL: 214 MS
Q-T INTERVAL: 480 MS
QRS DURATION: 160 MS
QTC CALCULATION (BEZET): 510 MS
R AXIS: -18 DEGREES
T AXIS: 112 DEGREES
VENTRICULAR RATE: 68 BPM

## 2024-05-03 NOTE — DISCHARGE SUMMARY
[facility]     Mitesh Flood Patient Status:  Inpatient    1965 MRN [id number]   Location [facility] 8NE-A Attending No att. providers found   Hosp Day # 1 PCP Shara Vance MD     Date of Admission: 2024  Date of Discharge:  2024     Discharge Disposition: Home or Self Care    Discharge Diagnosis:  Acute heart failure reduced EF 35-40% likely tachycardia cardia mediated  New A-fib with RVR  Hyponatremia due to hypervolemia  New left bundle branch block no significant CAD on left heart cath  Essential hypertension  Morbid obesity BMI 37.8 after diuresis  LEANRD compliant with CPAP  Hypokalemia    History of Present Illness:     Mitesh Flood is a 58 year old male with a past medical history of hypertension.  He is noncompliant with his medications.  He states that he occasionally has palpitations.  He comes the emergency room now secondary to shortness of breath which has been ongoing the past 2 days.  In the emergency room he was noted to have rapid atrial fibrillation and elevated blood pressure.  He was started on Cardizem drip and heparin drip.     Brief Synopsis:   58-year-old presented with palpitations increasing shortness of breath for the last several days.  Patient with history of hypertension.  Patient was found to be in A-fib with RVR started on heparin and Cardizem drips cardiology was consulted.  Echocardiogram showed reduced EF 35%.  Patient underwent ischemic workup after being diuresed CAD was not found but found to have elevated pulmonary pressures.  Patient was started on Bumex drip for 24 hours and had greater than 7 L urine output.  Overall patient had 20 pound weight loss.  Patient underwent AKILAH and cardioversion resulting in normal sinus rhythm on day of discharge.  He had been converted from heparin to Eliquis and will need to remain on Eliquis twice daily as ordered.  Patient's electrolytes were monitored and replaced as needed renal  function was stable weight is down 20 pounds.  Patient has been started on appropriate heart failure medications with education provided.  Failure has been felt to be due to his tachycardia from his A-fib of unknown duration.  Will need follow-up with cardiology and PCP.  Patient is feeling much better without shortness of breath discharged home.    Lace+ Score: 59  59-90 High Risk  29-58 Medium Risk  0-28   Low Risk  Patient was referred to the [facility].    [facility] Follow-Up Recommendation:  LACE > 58: High Risk of readmission after discharge from the hospital.      Procedures during hospitalization:   Echocardiogram  1. Left ventricle: The cavity size was normal. Wall thickness was mildly      increased. Systolic function was reduced. The estimated ejection fraction      was 35-40%, by visual assessment. Unable to assess LV diastolic function      due to heart rhythm.   2. Right ventricle: Systolic function was reduced.   3. Ventricular septum: These changes may be related to a left bundle branch      block.   4. Left atrium: The left atrial volume was moderately increased.   5. Aortic root: The aortic root was 3.9cm diameter.   6. Ascending aorta: The ascending aorta was 4.2cm diameter.   7. Mitral valve: There was mild to moderate regurgitation.   8. Pericardium, extracardiac: There was no pericardial effusion.   Left heart catheterization LM: Large caliber artery giving rise to LAD & LCX. No significant stenosis.  LAD: Large caliber artery giving rise to diagonal and septal branches. No significant stenosis..  LCX: Medium to large caliber artery giving rise to OM branches. No significant stenosis..  RCA: Large caliber artery giving rise to acute marginal branches, and bifurcates into RPDA & RPL. No significant stenosis. Right dominant circulation     Hemodynamics:  LV .130/12, LVEDP 22  /90, mean 108  No significant gradient upon Ao-LV pullback  LVEF 40%     AKILAH and DCCV resulting in  normal sinus rhythm no emboli seen on AKILAH  Moderately reduced LV systolic function with estimated LVEF 35% with global hypokinesis and dyssynergy of the septal wall due to LBBB. Grossly RV normal size and systolic function.  Left atrial enlargement  Normal mitral, tricuspid and pulmonic valves were seen.   There was a trileaflet aortic valve without stenosis and trivial insufficiency.   Velocities of average 60 cm/s were seen in the TATA. There was no evidence for intracardiac mass or thrombus in the TATA.  There was no evidence for reversal of flow in the pulmonary veins.   A color Doppler flow interrogation of the intra-atrial septum was performed and there was no intracardiac shunting to suggest an ASD or PFO.  There was minimal atherosclerotic plaque in the visualized aorta without evidence for mobile atheromata or thrombus.  No pericardial effusion.    Incidental or significant findings and recommendations (brief descriptions):  Started on Eliquis to 5 mg twice daily  Started on Entresto 1 tablet twice daily  Started on metoprolol  mg daily  Started on Aldactone  Started on torsemide  Needs follow-up cardiology 1 to 2 weeks  Continue with CPAP  Follow-up with PCP  Discharge weight 307 pounds down 20 pounds of diuresis  Low-salt diet fluid restriction to approximately 1500 to 2 L daily  Aware of need for follow-up  Lipid profile showed LDL of 84  Normal TSH    Lab/Test results pending at Discharge:   Mendez ephinephrine and aldosterone renin pending    Consultants:  Cardiology, social work    Discharge Medication List:     Discharge Medications        START taking these medications        Instructions Prescription details   apixaban 5 MG Tabs  Commonly known as: Eliquis      Take 1 tablet (5 mg total) by mouth 2 (two) times daily.   Quantity: 60 tablet  Refills: 6     Entresto 24-26 MG Tabs  Generic drug: sacubitril-valsartan      Take 1 tablet by mouth 2 (two) times daily.   Quantity: 60 tablet  Refills: 6      metoprolol succinate  MG Tb24  Commonly known as: Toprol XL      Take 1 tablet (100 mg total) by mouth 2x Daily(Beta Blocker).   Quantity: 60 tablet  Refills: 6     spironolactone 25 MG Tabs  Commonly known as: Aldactone  Start taking on: May 3, 2024      Take 1 tablet (25 mg total) by mouth daily.   Quantity: 30 tablet  Refills: 6     torsemide 20 MG Tabs  Commonly known as: Demadex  Start taking on: May 3, 2024      Take 1 tablet (20 mg total) by mouth daily.   Quantity: 30 tablet  Refills: 6            CONTINUE taking these medications        Instructions Prescription details   THERA/BETA-CAROTENE Tabs      Take 1 tablet by mouth.   Refills: 0            STOP taking these medications      amLODIPine 5 MG Tabs  Commonly known as: Norvasc        hydroCHLOROthiazide 25 MG Tabs        losartan 100 MG Tabs  Commonly known as: Cozaar        terbinafine 250 MG Tabs  Commonly known as: Lamisil                  Where to Get Your Medications        These medications were sent to [facility] [address], [address] [phone], [phone]  [address], [address], [facility] [address]      Phone: [phone]   apixaban 5 MG Tabs  Entresto 24-26 MG Tabs       These medications were sent to [id number] - [place] - [address] [facility] [address] [phone], [phone]  [address] [place] [address]      Hours: 24-hours Phone: [phone]   metoprolol succinate  MG Tb24  spironolactone 25 MG Tabs  torsemide 20 MG Tabs         [place] reviewed[place]ed    Follow-up appointment:   Shara Vance MD  [address] [facility]  [address]  [place] [address] [phone]    Follow up in 1 week(s)      Appointments for Next 30 Days 5/2/2024 - 6/1/2024        Date Arrival Time Visit Type Length Department Provider     5/8/2024  9:00 AM  [facility] HOSPITAL FOLLOW UP [5060] 60 min [facility] Gillian Baltazar APRN     Patient Instructions:         Location Instructions:     Masks are optional for all patients and visitors, unless otherwise indicated.                      Vital signs:  Temp:  [97.5 °F (36.4 °C)-98.3 °F (36.8 °C)] 98 °F (36.7 °C)  Pulse:  [] 73  Resp:  [9-32] 18  BP: ()/() 137/98  SpO2:  [91 %-100 %] 94 %    Physical Exam:    General: No acute distress awake alert  Lungs: clear to auscultation room air  Cardiovascular: S1, S2 regular rate rhythm normal sinus rhythm  Abdomen: Soft soft nontender  No edema    -----------------------------------------------------------------------------------------------  PATIENT DISCHARGE INSTRUCTIONS: See electronic chart    Leah Corrales NP    Total time spent on discharge planning:  x minutes     The 21st Century Cures Act makes medical notes like these available to patients in the interest of transparency. Please be advised this is a medical document. Medical documents are intended to carry relevant information, facts as evident, and the clinical opinion of the practitioner. The medical note is intended as peer to peer communication and may appear blunt or direct. It is written in medical language and may contain abbreviations or verbiage that are unfamiliar.

## 2024-05-03 NOTE — PROGRESS NOTES
Initial Post Discharge Follow Up   Discharge Date: 5/2/24  Contact Date: 5/3/2024    Consent Verification:  Assessment Completed With: Patient  HIPAA Verified?  Yes    Discharge Dx:   Atrial fibrillation with rapid ventricular response (HCC)        General:   How have you been since your discharge from the hospital? I'm doing pretty well. No more shortness of breath.   Do you have any pain since discharge?  No    When you were leaving the hospital were your discharge instructions reviewed with you? Yes  How well were your discharge instructions explained to you?   On a scale of 1-5   1- Very Poor and 5- Very well   Very Well  Do you have any questions about your discharge instructions?  No  Before leaving the hospital was your diagnoses explained to you? Yes  Do you have any questions about your diagnoses? No  Are you able to perform normal daily activities of living as you have prior to your hospital stay (dressing, bathing, ambulating to the bathroom, etc)? yes  (NCM) Was patient given a different diet per AVS? yes  If so, which diet?  Sodium and fluid restriction  Are there any barriers to following this diet? no    Medications:   Current Outpatient Medications   Medication Sig Dispense Refill    metoprolol succinate  MG Oral Tablet 24 Hr Take 1 tablet (100 mg total) by mouth 2x Daily(Beta Blocker). 60 tablet 6    spironolactone 25 MG Oral Tab Take 1 tablet (25 mg total) by mouth daily. 30 tablet 6    torsemide 20 MG Oral Tab Take 1 tablet (20 mg total) by mouth daily. 30 tablet 6    apixaban 5 MG Oral Tab Take 1 tablet (5 mg total) by mouth 2 (two) times daily. 60 tablet 6    sacubitril-valsartan (ENTRESTO) 24-26 MG Oral Tab Take 1 tablet by mouth 2 (two) times daily. 60 tablet 6    Multiple Vitamin (THERA/BETA-CAROTENE) Oral Tab Take 1 tablet by mouth.       Were there any changes to your current medication(s) noted on the AVS? Yes  If so, were these medication changes discussed with you prior to leaving  the hospital? Yes  If a new medication was prescribed:    Was the new medication's purpose & side effects reviewed? Yes  Do you have any questions about your new medication?  no  Did you  your discharge medications when you left the hospital? Yes  Let's go over your medications together to make sure we are not missing anything. Medications Reviewed  Are there any reasons that keep you from taking your medication as prescribed?  Pt states that he does not have the thera/beta carotene-NCM explained that this was a historical medication that was added in 2018 and not a new medication; he v/u and will discuss at [facility] appt  Are you having any concerns with constipation? No      Discharge medications reviewed/discussed/and reconciled against outpatient medications with patient.  Any changes or updates to medications sent to PCP.  Patient Acknowledged     Referrals/orders at D/C:  Referrals/orders placed at D/C? no    DME ordered at D/C? No      Discharge orders, AVS reviewed and discussed with patient. Any changes or updates to orders sent to PCP.  Patient Acknowledged      SDOH:   Transportation Needs: No Transportation Needs (4/28/2024)    Transportation Needs     Lack of Transportation: No     Financial Resource Strain: Low Risk  (5/3/2024)    Financial Resource Strain     Difficulty of Paying Living Expenses: Not hard at all     Med Affordability: No           Diagnosis specifics: CHF:   With your CHF diagnosis weighing yourself is very important  How often are you weighing yourself? Everyday now  Is there any reason you are unable to weigh yourself daily?  No  What was your weight yesterday? 307   Today? 307  Were you told about any fluid restrictions? Yes  Have you noticed any shortness of breath or waking up short of breath? no    Do you notice any pain or swelling in your abdomen?   no      Ankles or Legs?   no  Questions/Concerns: none      Follow up appointments:      Your appointments       Date & Time  Appointment Department (Center)    May 08, 2024 9:00 AM [facility] Follow Up with Gillian Baltazar APRN [facility] ([facility])              [facility]  [address] [place] [address]  [phone]            TCC  Was TCC ordered: Yes  Was TCC scheduled: Yes  If yes: [x]  Advised patient to bring all medications and blood glucose meter/supplies if applicable.      Specialist    Does the patient have any other follow up appointment(s) needing to be scheduled? No      Is there any reason as to why you cannot make your appointment(s)?  No     Needs post D/C:   Now that you are home, are there any needs or concerns you need addressed before your next visit with your PCP?  (DME, meds, questions, etc.): No    Interventions by NCM:   NCM reviewed discharge instructions and when to seek medical attention with the patient. He states that he is feeling perfectly fine. NCM discussed proper daily weights, keeping log and when to report weight gain; he v/u and states that he did start a weight log. He states that he will also start a bp log but has not checked his bp yet; he states that he will today. NCM instructed on s/s of infection; he v/u and states that right wrist is healed. He denied having any fever, n/v/c/d, sob, pain, lightheadedness, HA or any other symptoms. Med review completed. He questioned thera-beta carotene and NCM explained that this was a historical supplement that was reported in 2018. He states that he does not have it and will discuss at [facility] appt. He denied having any further questions or concerns.       CCM referral placed:    No    BOOK BY DATE: 5/16/24

## 2024-05-05 LAB
METANEPHRINE: <25 PG/ML
NORMETANEPHRINE: 87.5 PG/ML

## 2024-05-08 ENCOUNTER — OFFICE VISIT (OUTPATIENT)
Dept: INTERNAL MEDICINE CLINIC | Facility: CLINIC | Age: 59
End: 2024-05-08

## 2024-05-08 VITALS
DIASTOLIC BLOOD PRESSURE: 98 MMHG | RESPIRATION RATE: 18 BRPM | BODY MASS INDEX: 38.54 KG/M2 | TEMPERATURE: 97 F | HEIGHT: 75 IN | HEART RATE: 78 BPM | OXYGEN SATURATION: 99 % | WEIGHT: 310 LBS | SYSTOLIC BLOOD PRESSURE: 142 MMHG

## 2024-05-08 DIAGNOSIS — E87.6 HYPOKALEMIA: ICD-10-CM

## 2024-05-08 DIAGNOSIS — R06.02 SOB (SHORTNESS OF BREATH): Primary | ICD-10-CM

## 2024-05-08 DIAGNOSIS — I48.91 ATRIAL FIBRILLATION WITH RAPID VENTRICULAR RESPONSE (HCC): ICD-10-CM

## 2024-05-08 DIAGNOSIS — I10 ESSENTIAL HYPERTENSION: ICD-10-CM

## 2024-05-08 DIAGNOSIS — E87.1 HYPONATREMIA: ICD-10-CM

## 2024-05-08 DIAGNOSIS — I50.9 ACUTE CONGESTIVE HEART FAILURE, UNSPECIFIED HEART FAILURE TYPE (HCC): ICD-10-CM

## 2024-05-08 DIAGNOSIS — E78.2 MIXED HYPERLIPIDEMIA: ICD-10-CM

## 2024-05-08 LAB
ALDOST/RENIN RATIO: 29.6
ALDOSTERONE: 5.9 NG/DL
ANION GAP SERPL CALC-SCNC: 9 MMOL/L (ref 0–18)
BUN BLD-MCNC: 18 MG/DL (ref 9–23)
CALCIUM BLD-MCNC: 9.3 MG/DL (ref 8.5–10.1)
CHLORIDE SERPL-SCNC: 107 MMOL/L (ref 98–112)
CO2 SERPL-SCNC: 24 MMOL/L (ref 21–32)
CREAT BLD-MCNC: 1.04 MG/DL
EGFRCR SERPLBLD CKD-EPI 2021: 83 ML/MIN/1.73M2 (ref 60–?)
FASTING STATUS PATIENT QL REPORTED: NO
GLUCOSE BLD-MCNC: 103 MG/DL (ref 70–99)
OSMOLALITY SERPL CALC.SUM OF ELEC: 292 MOSM/KG (ref 275–295)
POTASSIUM SERPL-SCNC: 3.9 MMOL/L (ref 3.5–5.1)
RENIN ACTIVITY: 0.2 NG/ML/HR
SODIUM SERPL-SCNC: 140 MMOL/L (ref 136–145)

## 2024-05-08 PROCEDURE — 99495 TRANSJ CARE MGMT MOD F2F 14D: CPT | Performed by: NURSE PRACTITIONER

## 2024-05-08 PROCEDURE — 3077F SYST BP >= 140 MM HG: CPT | Performed by: NURSE PRACTITIONER

## 2024-05-08 PROCEDURE — 80048 BASIC METABOLIC PNL TOTAL CA: CPT | Performed by: NURSE PRACTITIONER

## 2024-05-08 PROCEDURE — 3008F BODY MASS INDEX DOCD: CPT | Performed by: NURSE PRACTITIONER

## 2024-05-08 PROCEDURE — 3080F DIAST BP >= 90 MM HG: CPT | Performed by: NURSE PRACTITIONER

## 2024-05-08 RX ORDER — MULTIVIT-MIN/IRON FUM/FOLIC AC 7.5 MG-4
1 TABLET ORAL DAILY
COMMUNITY

## 2024-05-08 NOTE — PATIENT INSTRUCTIONS
PATIENT INSTRUCTIONS:    Follow up with Cardiology APN on May 17th, at 9:30am  [address] [place] [address], [phone]

## 2024-05-08 NOTE — PROGRESS NOTES
TCM VISIT  [facility] TRANSITIONAL CARE CLINIC      HISTORY   CHIEF COMPLAINT: HFU-shortness of breath, A-fib with RVR, acute congestive heart failure, HTN, hyperlipidemia, hypokalemia, hyponatremia.     HPI: Mitesh Flood is a 58 year old male here today for hospital follow up for shortness of breath, A-fib with RVR, acute congestive heart failure, HTN, hyperlipidemia, hypokalemia, hyponatremia.  Mitesh Flood was discharged from [facility]  to Home or Self Care.  Admit Date: 4/28/24. Discharge Date: 5/2/24.     Hospital Discharge Diagnosis:      Acute heart failure reduced EF 35-40% likely tachycardia cardia mediated  New A-fib with RVR  Hyponatremia due to hypervolemia  New left bundle branch block no significant CAD on left heart cath  Essential hypertension  Morbid obesity BMI 37.8 after diuresis  LENARD compliant with CPAP  Hypokalemia    Hospital stay was uncomplicated.  Mitesh Flood was discharge with Eliquis, Entresto, metoprolol, spironolactone, torsemide, CHF restrictions, staff amlodipine, HCTZ, losartan, terbinafine and a referral to the [facility] for continued follow up.      Today, patient is being seen for hospital follow-up.  He reports doing good since discharge.  He is accompanied by his spouse at time of exam.    He presented to ED with shortness of breath which had been ongoing x 2 days.  He has history of noncompliance with his medications.  He states that he occasionally gets palpitations.  But now presented secondary to shortness of breath x 2 days.  In ED he was noted to have rapid atrial fibrillation and elevated BP.  He was started on Cardizem and heparin drips.  Cardiology was consulted.  Echocardiogram showed reduced ejection fraction of 35%.  Underwent ischemic workup after being diuresed.  CAD was not found but was found to have elevated pulmonary pressures.  He was started on Bumex drip x 24 hours and had greater than 7 L of urine output.  Overall patient had 20  pound weight loss.  He underwent AKILAH and cardioversion resulting in normal sinus rhythm on day of discharge.  He was transition from heparin to Eliquis and will need to remain on Eliquis twice daily as ordered.  His electrolytes were monitored and replaced as needed.  Renal function was stable, weight was down 20 pounds.  He was started on appropriate heart failure medications with education provided.  Heart failure was felt to be due to to his tachycardia from A-fib of unknown duration.  He will need follow-up with cardiology and PCP.  He is feeling much better without shortness of breath and was cleared by all consultants and was discharged home in stable condition.    Interactive contact within 2 business days post discharge first initiated on Date: 5/3/2024      Allergies:  Allergies   Allergen Reactions    Lisinopril Coughing      Current Meds:  Current Outpatient Medications   Medication Sig Dispense Refill    metoprolol succinate  MG Oral Tablet 24 Hr Take 1 tablet (100 mg total) by mouth 2x Daily(Beta Blocker). 60 tablet 6    spironolactone 25 MG Oral Tab Take 1 tablet (25 mg total) by mouth daily. 30 tablet 6    torsemide 20 MG Oral Tab Take 1 tablet (20 mg total) by mouth daily. 30 tablet 6    apixaban 5 MG Oral Tab Take 1 tablet (5 mg total) by mouth 2 (two) times daily. 60 tablet 6    sacubitril-valsartan (ENTRESTO) 24-26 MG Oral Tab Take 1 tablet by mouth 2 (two) times daily. 60 tablet 6    Multiple Vitamin (THERA/BETA-CAROTENE) Oral Tab Take 1 tablet by mouth. (Patient not taking: Reported on 5/3/2024)       No current facility-administered medications for this visit.       HISTORY: reconciled and reviewed with patient  Past Medical History:    Essential hypertension    High blood pressure    Sleep apnea      Past Surgical History:   Procedure Laterality Date    Wrist fracture surgery        Family History   Problem Relation Age of Onset    Heart Disorder Father     Dementia Father     Skin  cancer Sister     Hypertension Sister     Hypertension Brother     Other (Arn Cancer) Brother     No Known Problems Brother     Blindness Brother     Hypertension Brother       Social History     Socioeconomic History    Marital status:    Tobacco Use    Smoking status: Never    Smokeless tobacco: Never   Vaping Use    Vaping status: Never Used   Substance and Sexual Activity    Alcohol use: Yes     Alcohol/week: 15.0 standard drinks of alcohol     Types: 15 Standard drinks or equivalent per week     Comment: 3 times a week    Drug use: No   Other Topics Concern    Caffeine Concern No    Exercise Yes    Seat Belt Yes    Special Diet No    Stress Concern Yes    Weight Concern No     Social Determinants of Health     Financial Resource Strain: Low Risk  (5/3/2024)    Financial Resource Strain     Difficulty of Paying Living Expenses: Not hard at all     Med Affordability: No   Food Insecurity: No Food Insecurity (4/28/2024)    Food Insecurity     Food Insecurity: Never true   Transportation Needs: No Transportation Needs (4/28/2024)    Transportation Needs     Lack of Transportation: No   Housing Stability: Low Risk  (4/28/2024)    Housing Stability     Housing Instability: No        Imaging & Consults:        Lab Results   Component Value Date/Time    WBC 5.0 04/29/2024 05:47 AM    HGB 15.1 04/29/2024 05:47 AM    HCT 41.7 04/29/2024 05:47 AM    .0 (L) 04/29/2024 05:47 AM     (H) 05/08/2024 10:23 AM     05/08/2024 10:23 AM    K 3.9 05/08/2024 10:23 AM     05/08/2024 10:23 AM    CO2 24.0 05/08/2024 10:23 AM    BUN 18 05/08/2024 10:23 AM    CREATSERUM 1.04 05/08/2024 10:23 AM    CA 9.3 05/08/2024 10:23 AM    MG 2.2 04/29/2024 05:47 AM    ALB 4.0 04/28/2024 02:52 PM    ALT 48 04/28/2024 02:52 PM    AST 33 04/28/2024 02:52 PM    INR 1.13 04/28/2024 02:52 PM    A1C 4.9 11/21/2019 01:54 PM       Immunization History   Administered Date(s) Administered    Covid-19 Vaccine Moderna 100 mcg/0.5  ml 04/26/2021, 05/26/2021    Covid-19 Vaccine Moderna 50 Mcg/0.25 Ml 01/11/2022    FLULAVAL 6 months & older 0.5 ml Prefilled syringe ([id number]) 11/11/2019    FLUZONE 6 months and older PFS 0.5 ml ([id number]) 11/11/2019    TDAP 04/21/2010, 03/25/2018, 06/08/2020    Zoster Vaccine Recombinant Adjuvanted (Shingrix) 03/07/2023       ROS:  GENERAL: energy fair/stable, denies fever, weakness  SKIN: denies any skin changes, + right radial puncture site with mild bruising D/I  EYES: denies blurred vision, eye pain  HEENT: denies ear pain, loss of hearing, sore throat  RESPIRATORY: + occasional productive cough, denies dyspnea with exertion  CARDIOVASCULAR: denies syncope, chest pain, fatigue, palpitations   GI: denies abdominal pain, melena, constipation, diarrhea, nausea, vomiting   MUSCULOSKELETAL: denies pain, states normal range of motion of extremities  NEUROLOGIC: + occasonal confusion, + balance difficulty  PSYCHIATRIC: denies depression or anxiety  HEMATOLOGIC: denies history of anemia, + bruising, denies bleeding    PHYSICAL EXAM:  Vitals:    05/08/24 0959   BP: (!) 142/98   Pulse:    Resp:    Temp:        GENERAL: well developed, well nourished, in no apparent distress, wife is good historian  INTEGUMENTARY: warm, dry, no rashes, + right radial puncture site with mild bruising D/I  HEENT: atraumatic, normocephalic, sclera white, conjunctivae pink  NECK: supple  CHEST: no chest tenderness  LUNGS: Diminished but clear to auscultation bilaterally, no wheezes, rhonchi or rales, normal respiratory effort  CARDIO: S1, S2, RRR without audible murmur  GI: + BS to all quadrants, no tenderness  MUSCULOSKELETAL: + ROM in all joints, no evidence of swelling, pain   EXTREMITIES: no cyanosis, or edema  NEURO: Oriented x3  PSYCHIATRIC: appropriate affect    ASSESSMENT/ PLAN:   1. SOB (shortness of breath)/A-fib with RVR  Started on Cardizem and heparin drip  Underwent AKILAH and cardioversion resulting in NSR  Transition to oral DOAC and  metoprolol at discharge  Denies any palpitations since discharge  Noted with regular rhythm today  Tolerating an oral diet  Appetite is good/drinking well  Moving bowels/passing gas  Started on:  Apixaban 5 mg every 12 hours  Denies any S/S of bleeding at time of exam  Continue to monitor for any S/S of bleeding and report immediately  Metoprolol  mg 2 times daily  Follow-up with cardiology APN on 5/17 at 9:30 AM  Follow-up with neurology Dr. Nava on 7/3 at 9:40 AM-further workup of intermittent can fusion  Follow-up with PCP Xena Mendez APN on 6/6 at 8 AM  All hospital records, labs, radiology reviewed viewed    2. Acute congestive heart failure, unspecified heart failure type (HCC)  Acute onset likely secondary to tachycardia from A-fib of unknown duration  BNP at admission was 6451  Echocardiogram showed EF of 35%  Underwent ischemic workup after being diuresed-CAD was not found but was found to have elevated pulmonary pressures  Right radial puncture site present with mild bruising/high  Continue 2gr Na diet  Continue 2 L FR  Continue daily weights  Call cardiology if weight gain of 2 lbs overnight or 3-4 lbs in 3-5 days  LS are diminished but clear  Denies any shortness of breath  Reports occasional productive cough  Denies orthopnea-PND  Denies abdominal bloating  No LE swelling present at today's exam  Weight at discharge was 307 pounds-weight today was 310 pounds= 3 pound weight gain since discharge  Started on:  Entresto 24-26 mg 1 tab 2 times daily  Metoprolol  mg 2 times daily  Spironolactone 25 mg daily  Torsemide 20 mg daily  Appears euvolemic at time of exam  Patient was noted to be taking OTC potassium supplement-pharmacist counseled on stopping and potassium will be further monitored via labs while on diuretics and will replace potassium as needed  Continue to monitor for S/S of fluid overload and report immediately    3. Essential hypertension  BP was noted to be slightly elevated  at today's exam at 142/98 and was rechecked after 30 minutes and was 140/96  Patient was recommended to check BP at least daily and keep log of BP and HR and bring with to all follow-up  Reviewed proper technique for checking BP  Reports BP is running 109-135/74-83  Reports did have high BP this at 150/101   Continue:  Entresto 24-26 mg 1 tab 2 times daily  Metoprolol  mg 2 times daily  Continue to monitor for S/S of hyper hypotension    4. Mixed hyperlipidemia  Stable  Lipid panel checked in the hospital neck   Total cholesterol 136  HDL 51  Triglycerides 115  LDL 80  Not currently on lipid-lowering medication  Next goal LDL is less than 100 and ideally less than to prevent cardiovascular and cerebral vascular disease  Would likely benefit from statin addition in the future  Discussed with cardiology at    5. Hypokalemia  Noted with hypokalemia on 4/29 with potassium of 3.4  Replaced per electrolyte protocol  Potassium found to be low again on 5/2 at 3.1  Oral potassium given prior to discharge  Ordered basic Metabolic Panel-today  Potassium stable at 3.9  RN notified patient's wife of results  Continue to monitor labs as directed    6. Hyponatremia  Noted with mild hyponatremia with sodium of 135 at admission  Did improve during hospitalization  Sodium prior to discharge on 5/2 was stable at 135  Ordered basic Metabolic Panel-today  Repeat sodium was stable at 140  RN noted hide patient's wife of results  Continue to monitor labs as directed        Orders Placed This Encounter    Basic Metabolic Panel (8) [E]     Standing Status:   Future     Number of Occurrences:   1     Standing Expiration Date:   5/8/2025     Order Specific Question:   Release to patient     Answer:   Immediate    Multiple Vitamins-Minerals (MULTI-VITAMIN/MINERALS) Oral Tab     Sig: Take 1 tablet by mouth daily.           Medications & Refills for this Visit:   Multiple Vitamins-Minerals (MULTI-VITAMIN/MINERALS) Oral Tab Take 1 tablet by  mouth daily.      metoprolol succinate  MG Oral Tablet 24 Hr Take 1 tablet (100 mg total) by mouth 2x Daily(Beta Blocker). 60 tablet 6    spironolactone 25 MG Oral Tab Take 1 tablet (25 mg total) by mouth daily. 30 tablet 6    torsemide 20 MG Oral Tab Take 1 tablet (20 mg total) by mouth daily. 30 tablet 6    apixaban 5 MG Oral Tab Take 1 tablet (5 mg total) by mouth 2 (two) times daily. 60 tablet 6    sacubitril-valsartan (ENTRESTO) 24-26 MG Oral Tab Take 1 tablet by mouth 2 (two) times daily. 60 tablet 6     Requested Prescriptions      No prescriptions requested or ordered in this encounter         Health Maintenance:  Health Maintenance   Topic Date Due    Pneumococcal Vaccine: Birth to 64yrs (1 of 2 - PCV) Never done    Zoster Vaccines (2 of 2) 05/02/2023    COVID-19 Vaccine (4 - 2023-24 season) 09/01/2023    Annual Physical  03/07/2024    PSA  03/14/2024    HTN: BP Follow-Up  06/08/2024    Influenza Vaccine (Season Ended) 10/01/2024    Colorectal Cancer Screening  07/19/2027    DTaP,Tdap,and Td Vaccines (4 - Td or Tdap) 06/08/2030    Annual Depression Screening  Completed       Chronic Care Management Referral: N/A      Transitional Care Management Certification:  During the visit, the following was completed:  Obtained and reviewed discharge summary, continuity of care documents, Hospitalist notes, Cardiology notes, and discharge information   Reviewed Labs (CBC, CMP), Labs (Cardiac markers), Labs (Microbiology), X-Ray radiology results, EKG, Echocardiogram, Advanced Cardiac assessments/imaging, Operative reports: Cardiac, AKILAH, DCCV, BNP, PT, PTT, TSH, lipid panel, magnesium, aldosterone/renin, metanephrines, need for follow-up on pending tests, need for follow-up on diagnostic tests, and need for follow-up on treatments    Medication Reconciliation:  I am aware of an inpatient discharge within the last 30 days.  The discharge medication list has been reconciled with the patient's current medication  list and reviewed by me.  See medication list for additions of new medication, and changes to current doses of medications and discontinued medications.        Discharge Disposition/Plan:     Future Appointments   Date Time Provider Department Center   6/6/2024  8:00 AM Ritu Mendez APRN [facility]   7/3/2024  9:40 AM Ana Nava MD [facility]      1.  Transitional Care Clinic Visit: Next visit Discharged  2.  PCP Visit: 6/6/2024  3.  Chronic Care Management: N/A     DHIRAJ Arenas, 5/8/2024  [facility]  [phone]

## 2024-05-08 NOTE — PROGRESS NOTES
TRANSITIONAL CARE CLINIC PHARMACIST MEDICATION RECONCILIATION        Mitesh Flood MRN [id number]    1965 PCP Shara Vance MD       Comments: Medication history completed by the Transitional Care Clinic Pharmacist with the patient and spouse in the office.     The following medication changes occurred while patient was hospitalized:  Medications Started:  Apixaban 5mg tabs - 1 tablet two times daily  Entresto 24-26mg tabs - 1 tablet two times daily  Metoprolol Succinate ER 100mg tabs - 1 tablet two times daily  Spironolactone 25mg tabs - 1 tablet daily  Torsemide 20mg tabs - 1 tablet daily    Medications Stopped:  Amlodipine 5mg tabs  Hydrochlorothiazide 25mg tabs  Losartan 100mg tabs  Terbinafine 250mg tabs    Outpatient Encounter Medications as of 2024   Medication Sig    Multiple Vitamins-Minerals (MULTI-VITAMIN/MINERALS) Oral Tab Take 1 tablet by mouth daily.    metoprolol succinate  MG Oral Tablet 24 Hr Take 1 tablet (100 mg total) by mouth 2x Daily(Beta Blocker).    spironolactone 25 MG Oral Tab Take 1 tablet (25 mg total) by mouth daily.    torsemide 20 MG Oral Tab Take 1 tablet (20 mg total) by mouth daily.    apixaban 5 MG Oral Tab Take 1 tablet (5 mg total) by mouth 2 (two) times daily.    sacubitril-valsartan (ENTRESTO) 24-26 MG Oral Tab Take 1 tablet by mouth 2 (two) times daily.     Medication Adherence Assessment:   Patient reports taking all new medications as prescribed.  Denies any lightheadedness, dizziness, or signs of bleeding.  Checking blood pressure at home in the morning and it has been ranging from 109-150/. Blood pressure in the office was 140/96 and 142/98 upon recheck.  APRN to assess further.  Patient complaining of feeling very tired.  Discussed that the Metoprolol could cause some fatigue, and the hospitalization can lead to fatigue, so recommended giving it some time to improve.   APRN to assess further.    Reviewed the signs of bleeding with the  Apixaban.  Counseled on avoiding NSAID's while taking the Apixaban, but can take Acetaminophen as needed for fever/pain.  Patient reports taking some Potassium OTC since discharge.  Recommended the patient stop the Potassium supplement since both the Entresto and Spironolactone will help keep potassium in the body.  Answered all questions.    Reviewed all medications in detail with patient including dose, indication, timing of administration, monitoring parameters, and potential side effects of medications.   Patient confirmed understanding.     Thank you,    Mandie Armas, PharmD, 5/8/2024, 10:03 AM  [facility]

## 2024-06-06 ENCOUNTER — OFFICE VISIT (OUTPATIENT)
Dept: INTERNAL MEDICINE CLINIC | Facility: CLINIC | Age: 59
End: 2024-06-06
Payer: COMMERCIAL

## 2024-06-06 VITALS
OXYGEN SATURATION: 93 % | TEMPERATURE: 97 F | BODY MASS INDEX: 38.37 KG/M2 | SYSTOLIC BLOOD PRESSURE: 144 MMHG | DIASTOLIC BLOOD PRESSURE: 94 MMHG | HEIGHT: 75 IN | HEART RATE: 73 BPM | WEIGHT: 308.63 LBS

## 2024-06-06 DIAGNOSIS — Z00.00 PHYSICAL EXAM: ICD-10-CM

## 2024-06-06 DIAGNOSIS — R53.1 GENERAL WEAKNESS: ICD-10-CM

## 2024-06-06 DIAGNOSIS — I44.7 BUNDLE BRANCH BLOCK, LEFT: ICD-10-CM

## 2024-06-06 DIAGNOSIS — E66.01 CLASS 2 SEVERE OBESITY DUE TO EXCESS CALORIES WITH SERIOUS COMORBIDITY AND BODY MASS INDEX (BMI) OF 38.0 TO 38.9 IN ADULT (HCC): ICD-10-CM

## 2024-06-06 DIAGNOSIS — G47.33 OSA (OBSTRUCTIVE SLEEP APNEA): ICD-10-CM

## 2024-06-06 DIAGNOSIS — I10 ESSENTIAL HYPERTENSION: ICD-10-CM

## 2024-06-06 DIAGNOSIS — Z13.220 SCREENING FOR CHOLESTEROL LEVEL: ICD-10-CM

## 2024-06-06 DIAGNOSIS — R06.02 SOB (SHORTNESS OF BREATH): ICD-10-CM

## 2024-06-06 DIAGNOSIS — I50.9 ACUTE CONGESTIVE HEART FAILURE, UNSPECIFIED HEART FAILURE TYPE (HCC): ICD-10-CM

## 2024-06-06 DIAGNOSIS — Z09 HOSPITAL DISCHARGE FOLLOW-UP: Primary | ICD-10-CM

## 2024-06-06 DIAGNOSIS — Z12.5 SCREENING FOR PROSTATE CANCER: ICD-10-CM

## 2024-06-06 DIAGNOSIS — I48.91 ATRIAL FIBRILLATION WITH RAPID VENTRICULAR RESPONSE (HCC): ICD-10-CM

## 2024-06-06 DIAGNOSIS — Z13.29 SCREENING FOR THYROID DISORDER: ICD-10-CM

## 2024-06-06 DIAGNOSIS — E78.2 MIXED HYPERLIPIDEMIA: ICD-10-CM

## 2024-06-06 PROCEDURE — 3080F DIAST BP >= 90 MM HG: CPT | Performed by: NURSE PRACTITIONER

## 2024-06-06 PROCEDURE — 3077F SYST BP >= 140 MM HG: CPT | Performed by: NURSE PRACTITIONER

## 2024-06-06 PROCEDURE — 3008F BODY MASS INDEX DOCD: CPT | Performed by: NURSE PRACTITIONER

## 2024-06-06 PROCEDURE — 99215 OFFICE O/P EST HI 40 MIN: CPT | Performed by: NURSE PRACTITIONER

## 2024-06-06 RX ORDER — SEMAGLUTIDE 0.68 MG/ML
0.25 INJECTION, SOLUTION SUBCUTANEOUS WEEKLY
Qty: 1 EACH | Refills: 0 | Status: SHIPPED | OUTPATIENT
Start: 2024-06-06

## 2024-06-06 NOTE — PATIENT INSTRUCTIONS
Most insurance providers require a diagnosis of type 2 diabetes for medications such as Mounjaro or Ozempic. We ask that you please contact your insurance provider to see which medications are covered for weight loss and for what specific diagnosis. Some medications to ask about that may be helpful are: Zepbound, Wegovy, Mounjaro, Ozempic and Trulicity. Please note, sometimes insurance will say a medication is covered with a prior-authorization, this does not always mean that the prior authorization will be approved. Please reach back out or schedule an appointment once you have spoken to insurance.      If Ozempic is covered start it and monitor closely for side effects, effectiveness, allergy.    Use my fitness pal for nutrition    Continue to see cardiology and continue your heart medications per their management.    If you have any emergent symptoms go to the emergency room as needed.  If you cannot get there safely call 911.    Get your labs done. You should be fasting for at least 10 hours. If you take a multivitamin with Biotin or any biotin product it should be held for 3 days prior to getting your labs done.     Start physical therapy for your weakness    Follow-up in 1 month for your annual physical or sooner as needed    Increase your hydration, be active, eat fruits and veggies, drink hot decaf liquids. If no improvement start metamucil daily. If no improvement start mirilax daily as needed. If still no improvement do prune punch (4oz of orange juice or apple if you have acid reflux, 4 oz of prune juice heated in the microwave together until hot and add a dose of milk of magnesia from over the counter) daily as needed.   Constipation (Adult)  Constipation means that you have bowel movements that are less frequent than usual. Stools often become very hard and difficult to pass.  Constipation is very common. At some point in life, it affects almost everyone. Since everyone's bowel habits are different,  what is constipation to one person may not be to another. Your healthcare provider may do tests to diagnose constipation. It depends on what he or she finds when evaluating you.    Symptoms of constipation include:  Abdominal pain  Bloating  Vomiting  Painful bowel movements  Itching, swelling, bleeding, or pain around the anus  Causes  Constipation can have many causes. These include:  Diet low in fiber  Too much dairy  Not drinking enough liquids  Lack of exercise or physical activity (especially true for older adults)  Changes in lifestyle or daily routine, including pregnancy, aging, work, and travel  Frequent use or misuse of laxatives  Ignoring the urge to have a bowel movement or delaying it until later  Medicines, such as certain prescription pain medicines, iron supplements, antacids, certain antidepressants, and calcium supplements  Diseases like irritable bowel syndrome, bowel obstructions, stroke, diabetes, thyroid disease, Parkinson disease, hemorrhoids, and colon cancer  Complications  Potential complications of constipation can include:  Hemorrhoids  Rectal bleeding from hemorrhoids or anal fissures (skin tears)  Hernias  Dependency on laxatives  Chronic constipation  Fecal impaction, a severe form of constipation in which a large amount of hard stool is in your rectum that you can't pass  Bowel obstruction or perforation  Home care  All treatment should be done after talking with your healthcare provider. This is especially true if you have another medical problems, are taking prescription medicines, or are an older adult. Treatment most often involves lifestyle changes. You may also need medicines. Your healthcare provider will tell you which will work best for you. Follow the advice below to help avoid this problem in the future.  Lifestyle changes  These lifestyle changes can help prevent constipation:  Diet. Eat a high-fiber diet, with fresh fruit and vegetables, and reduce dairy intake, meats,  and processed foods  Fluids. It's important to get enough fluids each day. Drink plenty of water when you eat more fiber. If you are on diet that limits the amount of fluid you can have, talk about this with your healthcare provider.  Regular exercise. Check with your healthcare provider first.  Medicines  Take any medicines as directed. Some laxatives are safe to use only every now and then. Others can be taken on a regular basis. While laxatives don't cause bowel dependence, they are treating the symptoms. So your constipation may return if you don't make other changes. Talk with your healthcare provider or pharmacist if you have questions.  Prescription pain medicines can cause constipation. If you are taking this kind of medicine, ask your healthcare provider if you should also take a stool softener.  Medicines you may take to treat constipation include:  Fiber supplements  Stool softeners  Laxatives  Enemas  Rectal suppositories  Follow-up care  Follow up with your healthcare provider if symptoms don't get better in the next few days. You may need to have more tests or see a specialist.  Call 911  Call 911 if any of these occur:  Trouble breathing  Stiff, rigid abdomen that is severely painful to touch  Confusion  Fainting or loss of consciousness  Rapid heart rate  Chest pain  When to seek medical advice  Call your healthcare provider right away if any of these occur:  Fever of 100.4°F (38°C) or higher, or as directed by your healthcare provider  Failure to resume normal bowel movements  Pain in your abdomen or back gets worse  Nausea or vomiting  Swelling in your abdomen  Blood in the stool  Black, tarry stool  Involuntary weight loss  Weakness  [employer] last reviewed this educational content on 6/1/2018 © 2000-2021 [employer]. All rights reserved. This information is not intended as a substitute for professional medical care. Always follow your healthcare professional's instructions.

## 2024-06-06 NOTE — PROGRESS NOTES
Subjective:   Mitesh Flood is a 58 year old male who presents for hospital follow up.   He was discharged from Inpatient hospital, [facility] to Home   Admit Date: 4/28/24  Discharge Date: 5/2/24  Hospital Discharge Diagnosis: Acute CHF, new A-fib with RVR, hyponatremia, left bundle branch block, hypertension, obesity    Interactive contact within 2 business days post discharge first initiated on Date: NA    During the visit, the following was completed:  Obtained and reviewed discharge summary, continuity of care documents, and Hospitalist notes  Reviewed Labs (CBC, CMP)    HPI: The patient was hospitalized for CHF, new onset A-fib RVR, hyponatremia, left bundle branch block, hypertension, obesity, sleep apnea, low potassium.  Since February he reports he was having some general weakness more than usual and fatigue.  It progressed to shortness of breath with activity that would resolve once he rested.  About 4 days prior to his admission he was very winded even just walking across the room would make him short of breath.  He went to the emergency room for further evaluation.  He was started on Cardizem drip and heparin drip after being found to be in A-fib RVR.  Cardiology was consulted his echo showed EF of 35%.  After being diuresed he had an heart cath completed which did not show obstruction but did show elevated pulmonary pressures.  He had 20 pound weight loss during his hospital stay and underwent AKILAH with cardioversion that was successful.  He was discharged home with current medications including anticoagulation.    He reports he is feeling so much better.  No more shortness of breath at all with activity.  He does have some general weakness mainly to his legs since he was not as active for so many months prior to his hospital stay.  He does not have any constipation, symptoms of pneumonia, or symptoms of blood clots to his legs.  He does not have any side effects to his medications.  His  blood pressure at home has been trending anywhere from 120s to 140s/70 to upper 80s.  He is following up with cardiology closely.  He has another upcoming appointment with them.  He is also scheduled for another repeat echocardiogram.  He does not have any chest pain, shortness of breath, palpitations, dizziness.    He did mention that cardiology was to put him on Ozempic but that has not been ordered yet.  They do want him to lose weight and get down to a normal BMI of 25.    He is seeing pulmonology for a updated sleep study and management of his sleep apnea.      History/Other:   Current Medications:  Medication Reconciliation:  I am aware of an inpatient discharge within the last 30 days.  The discharge medication list has been reconciled with the patient's current medication list and reviewed by me.  See medication list for additions of new medication, and changes to current doses of medications and discontinued medications.  Outpatient Medications Marked as Taking for the 6/6/24 encounter (Office Visit) with Ritu Mendez APRN   Medication Sig    Multiple Vitamins-Minerals (MULTI-VITAMIN/MINERALS) Oral Tab Take 1 tablet by mouth daily.    metoprolol succinate  MG Oral Tablet 24 Hr Take 1 tablet (100 mg total) by mouth 2x Daily(Beta Blocker).    spironolactone 25 MG Oral Tab Take 1 tablet (25 mg total) by mouth daily.    torsemide 20 MG Oral Tab Take 1 tablet (20 mg total) by mouth daily.    apixaban 5 MG Oral Tab Take 1 tablet (5 mg total) by mouth 2 (two) times daily.    sacubitril-valsartan (ENTRESTO) 24-26 MG Oral Tab Take 1 tablet by mouth 2 (two) times daily.       Review of Systems:  GENERAL: weight stable, energy stable, no sweating  SKIN: denies any unusual skin lesions  EYES: denies blurred vision or double vision  HEENT: denies nasal congestion, sinus pain or ST  LUNGS: denies shortness of breath with exertion  CARDIOVASCULAR: denies chest pain on exertion or palpitations  GI: denies  abdominal pain, denies heartburn, denies diarrhea  MUSCULOSKELETAL: denies pain, normal range of motion of extremities  NEURO: denies headaches, denies dizziness   PSYCHE: denies depression or anxiety  HEMATOLOGIC: denies hx of anemia, or bruising, denies bleeding  ENDOCRINE: denies thyroid history  ALL/ASTHMA: denies hx of allergy or asthma    Objective:   No LMP for male patient.  Estimated body mass index is 38.57 kg/m² as calculated from the following:    Height as of this encounter: 6' 3\" (1.905 m).    Weight as of this encounter: 308 lb 9.6 oz (140 kg).   BP (!) 144/94 (BP Location: Right arm, Patient Position: Sitting, Cuff Size: large)   Pulse 73   Temp 97.1 °F (36.2 °C) (Temporal)   Ht 6' 3\" (1.905 m)   Wt (!) 308 lb 9.6 oz (140 kg)   SpO2 93%   BMI 38.57 kg/m²    GENERAL: well developed, well nourished, in no apparent distress  SKIN: no rashes, no suspicious lesions  HEENT: atraumatic, normocephalic  LUNGS: clear to auscultation  CARDIO: RRR without murmur  GI: no tenderness  MUSCULOSKELETAL: back is not tender, FROM of the extremities  EXTREMITIES: no edema  NEURO: Oriented times three     Assessment & Plan:   1. Hospital discharge follow-up (Primary)  2. SOB (shortness of breath)  3. Atrial fibrillation with rapid ventricular response (HCC)  4. Acute congestive heart failure, unspecified heart failure type (HCC)  5. Bundle branch block, left  6. Essential hypertension  7. Mixed hyperlipidemia  -Continue management of all cardiac conditions per cardiology/EP.  -Was advised to monitor closely for symptoms and communicate with cardiology as needed  -To ER for any emergent symptoms.  -Was advised to take his medications as directed by cardiology and not to skip doses.    8. General weakness  -     OP REFERRAL TO [facility]  - Patient is generally weak after his hospital stay and months of not exercising due to active  -Start PT    9. Class 2 severe obesity due to excess calories  with serious comorbidity and body mass index (BMI) of 38.0 to 38.9 in adult (HCC)  -     Ozempic (0.25 or 0.5 MG/DOSE); Inject 0.25 mg into the skin once a week.  Dispense: 1 each; Refill: 0  -Patient has obesity.  Cardiology wants him to get under about 25 for his BMI.  They suggested Ozempic.  -Ozempic prescription sent to pharmacy.  Patient advised on side effects, risk, the benefits.  -He was also provided other names of GLP-1 medications in case Ozempic is not covered so he can check with his insurance on other options.    10. LENARD (obstructive sleep apnea)  -Continue management per pulmonology    11. Screening for cholesterol level  -     Lipid Panel; Future; Expected date: 06/06/2024    12. Screening for thyroid disorder  -     TSH W Reflex To Free T4; Future; Expected date: 06/06/2024    13. Screening for prostate cancer  -     PSA Total, Screen; Future; Expected date: 06/06/2024    14. Physical exam  -     CBC With Differential With Platelet; Future; Expected date: 06/06/2024  -     Comp Metabolic Panel (14); Future; Expected date: 06/06/2024        No follow-ups on file.     Follow up in 1 month for PE or sooner as needed     Total face to face time was 60 mins, more than 50% of the time was spent in counseling and/or coordination of care related to HFU.

## 2024-07-14 DIAGNOSIS — I10 ESSENTIAL HYPERTENSION: ICD-10-CM

## 2024-07-16 RX ORDER — AMLODIPINE BESYLATE 5 MG/1
5 TABLET ORAL DAILY
Qty: 90 TABLET | Refills: 0 | OUTPATIENT
Start: 2024-07-16

## 2024-08-14 ENCOUNTER — TELEPHONE (OUTPATIENT)
Dept: INTERNAL MEDICINE CLINIC | Facility: CLINIC | Age: 59
End: 2024-08-14

## 2024-08-19 NOTE — TELEPHONE ENCOUNTER
Date of pre-op appt:  8/28/24  Provider pre-op scheduled with: Sho Mendez  Surgeon's name:  Dr. Cardona   Phone #:  [phone]   Fax #:  [phone]  Surgery date:  10/4/24  Procedure/diagnosis:  Left Total Knee Arthroplasty    Pre op form given to: Jaclyn

## 2024-08-28 ENCOUNTER — OFFICE VISIT (OUTPATIENT)
Dept: INTERNAL MEDICINE CLINIC | Facility: CLINIC | Age: 59
End: 2024-08-28
Payer: COMMERCIAL

## 2024-08-28 VITALS
HEIGHT: 75 IN | SYSTOLIC BLOOD PRESSURE: 140 MMHG | TEMPERATURE: 97 F | OXYGEN SATURATION: 93 % | DIASTOLIC BLOOD PRESSURE: 92 MMHG | BODY MASS INDEX: 39.17 KG/M2 | RESPIRATION RATE: 16 BRPM | WEIGHT: 315 LBS | HEART RATE: 73 BPM

## 2024-08-28 DIAGNOSIS — E78.2 MIXED HYPERLIPIDEMIA: ICD-10-CM

## 2024-08-28 DIAGNOSIS — Z01.818 PREOP EXAM FOR INTERNAL MEDICINE: Primary | ICD-10-CM

## 2024-08-28 DIAGNOSIS — G47.33 OSA (OBSTRUCTIVE SLEEP APNEA): ICD-10-CM

## 2024-08-28 DIAGNOSIS — E66.01 CLASS 2 SEVERE OBESITY DUE TO EXCESS CALORIES WITH SERIOUS COMORBIDITY AND BODY MASS INDEX (BMI) OF 39.0 TO 39.9 IN ADULT (HCC): ICD-10-CM

## 2024-08-28 DIAGNOSIS — I10 ESSENTIAL HYPERTENSION: ICD-10-CM

## 2024-08-28 DIAGNOSIS — I50.9 CHRONIC HEART FAILURE, UNSPECIFIED HEART FAILURE TYPE (HCC): ICD-10-CM

## 2024-08-28 DIAGNOSIS — M25.562 CHRONIC PAIN OF LEFT KNEE: ICD-10-CM

## 2024-08-28 DIAGNOSIS — I48.91 ATRIAL FIBRILLATION WITH RAPID VENTRICULAR RESPONSE (HCC): ICD-10-CM

## 2024-08-28 DIAGNOSIS — G89.29 CHRONIC PAIN OF LEFT KNEE: ICD-10-CM

## 2024-08-28 PROCEDURE — 3008F BODY MASS INDEX DOCD: CPT | Performed by: NURSE PRACTITIONER

## 2024-08-28 PROCEDURE — 3077F SYST BP >= 140 MM HG: CPT | Performed by: NURSE PRACTITIONER

## 2024-08-28 PROCEDURE — 3080F DIAST BP >= 90 MM HG: CPT | Performed by: NURSE PRACTITIONER

## 2024-08-28 PROCEDURE — 99244 OFF/OP CNSLTJ NEW/EST MOD 40: CPT | Performed by: NURSE PRACTITIONER

## 2024-08-28 NOTE — PATIENT INSTRUCTIONS
Get your labs done. You should be fasting for at least 10 hours. If you take a multivitamin with Biotin or any biotin product it should be held for 3 days prior to getting your labs done.     Talk to cardiology about clearance    Stop NSAIDs one week before surgery    Montior for signs of infection (redness, warmth, swelling, drainage, pain) after sugery    Monitor for signs of pneumonia after surgery (shortness of breath, productive cough, fever, chills)    Monitor for signs of blood clot after surgery ( leg pain, swelling, warmth, redness)    Monitor for constipation and use stool softeners/laxatives as needed. You may do prune punch daily as needed (4oz of prune juice, 4 oz of orange juice heated in the microwave and add one dose of milk of magnesia over the counter).      Most insurance providers require a diagnosis of type 2 diabetes for medications such as Mounjaro or Ozempic. We ask that you please contact your insurance provider to see which medications are covered for weight loss and for what specific diagnosis. Some medications to ask about that may be helpful are: Zepbound, Wegovy, Mounjaro, Ozempic and Trulicity. Please note, sometimes insurance will say a medication is covered with a prior-authorization, this does not always mean that the prior authorization will be approved. Please reach back out or schedule an appointment once you have spoken to insurance.      Follow up as needed or in 2 months for your annual physical

## 2024-08-28 NOTE — PROGRESS NOTES
[facility]  PRE OP RISK ASSESSMENT    REASON FOR CONSULT: Pre-op risk assessment for surgical procedure left total knee arthroplasty planned for 10/4/24 with general anesthesia.    REQUESTING PHYSICIAN: Dr. Cardona    CHIEF COMPLAINT:   Chief Complaint   Patient presents with    Pre-Op Exam     Left Total Knee Arthroplasty with Dr. Coombs on 10/4/2024       HISTORY OF PRESENT ILLNESS:   The patient is a 58 year old  male  presents with severe left knee pain scheduled for the above procedure. He has a PMH of afib, CHF, HTN, HLD, obesity and sleep apnea. He is doing well with no chest pain or sob at rest or with exertion. He is completely independent with ADLs. He is seeing cardiology.     Past Medical History:    Past Medical History:    Essential hypertension    High blood pressure    Sleep apnea        Past Surgical History:    Past Surgical History:   Procedure Laterality Date    Wrist fracture surgery         Current Medications:    Current Outpatient Medications   Medication Sig Dispense Refill    Multiple Vitamins-Minerals (MULTI-VITAMIN/MINERALS) Oral Tab Take 1 tablet by mouth daily.      metoprolol succinate  MG Oral Tablet 24 Hr Take 1 tablet (100 mg total) by mouth 2x Daily(Beta Blocker). 60 tablet 6    spironolactone 25 MG Oral Tab Take 1 tablet (25 mg total) by mouth daily. 30 tablet 6    torsemide 20 MG Oral Tab Take 1 tablet (20 mg total) by mouth daily. 30 tablet 6    apixaban 5 MG Oral Tab Take 1 tablet (5 mg total) by mouth 2 (two) times daily. 60 tablet 6    sacubitril-valsartan (ENTRESTO) 24-26 MG Oral Tab Take 1 tablet by mouth 2 (two) times daily. 60 tablet 6         Allergies:    Allergies as of 08/28/2024 - Review Complete 08/28/2024   Allergen Reaction Noted    Lisinopril Coughing 09/19/2018       SOCIAL HISTORY:   Social History     Socioeconomic History    Marital status:      Spouse name: Not on file    Number of children: Not on file    Years of education: Not on file     Highest education level: Not on file   Occupational History    Not on file   Tobacco Use    Smoking status: Never    Smokeless tobacco: Never   Vaping Use    Vaping status: Never Used   Substance and Sexual Activity    Alcohol use: Yes     Alcohol/week: 15.0 standard drinks of alcohol     Types: 15 Standard drinks or equivalent per week     Comment: 3 times a week    Drug use: No    Sexual activity: Not on file   Other Topics Concern    Caffeine Concern No    Exercise Yes    Seat Belt Yes    Special Diet No    Stress Concern Yes    Weight Concern No   Social History Narrative    Not on file     Social Determinants of Health     Financial Resource Strain: Low Risk  (5/3/2024)    Financial Resource Strain     Difficulty of Paying Living Expenses: Not hard at all     Med Affordability: No   Food Insecurity: No Food Insecurity (4/28/2024)    Food Insecurity     Food Insecurity: Never true   Transportation Needs: No Transportation Needs (4/28/2024)    Transportation Needs     Lack of Transportation: No   Physical Activity: Not on file   Stress: Not on file   Social Connections: Not on file   Housing Stability: Low Risk  (4/28/2024)    Housing Stability     Housing Instability: No     Housing Instability Emergency: Not on file     Crib or Bassinette: Not on file        FAMILY HISTORY:   Family History   Problem Relation Age of Onset    Heart Disorder Father     Dementia Father     Skin cancer Sister     Hypertension Sister     Hypertension Brother     Other (Arn Cancer) Brother     No Known Problems Brother     Blindness Brother     Hypertension Brother         REVIEW OF SYSTEMS:   PRE-OP ROS  NSAIDS/PLATELET INH: ibuprofen.   DIABETIC MEDICATIONS: None  LENARD; CPAP   Hx of VTE: None for your, family hx of them   BLEEDING DISORDER: None  LOOSE DENTITION OR DENTAL APPLIANCES: none  URI, COUGH, CP, FEVER: None  CERVICAL SPINE RESTRICTION: none    PHYSICAL EXAM:  /86 (BP Location: Left arm, Patient Position: Sitting,  Cuff Size: large)   Pulse 73   Temp 97 °F (36.1 °C) (Temporal)   Resp 16   Ht 6' 3\" (1.905 m)   Wt (!) 316 lb 12.8 oz (143.7 kg)   SpO2 93%   BMI 39.60 kg/m²   Body mass index is 39.6 kg/m².   GENERAL: well developed, well nourished,in no apparent distress  SKIN: no rashes,no suspicious lesions  HEENT: atraumatic, normocephalic  LUNGS: clear to auscultation; no rhonchi, rales, or wheezing  CARDIO: RRR without murmur  GI:no masses, organomegaly or tenderness  MUSCULOSKELETAL: No obvious joint deformity or swelling.  Normal gait.  EXTREMITIES: no edema or calve tenderness   NEURO: Oriented times three,cranial nerves are grossly intact,motor and sensory are grossly intact    LABS:  Lab Results   Component Value Date    WBC 5.0 04/29/2024    RBC 4.54 04/29/2024    HGB 15.1 04/29/2024    HCT 41.7 04/29/2024    MCV 91.9 04/29/2024    MCH 33.3 04/29/2024    MCHC 36.2 04/29/2024    RDW 13.7 04/29/2024    .0 (L) 04/29/2024      Lab Results   Component Value Date     (H) 05/08/2024    BUN 18 05/08/2024    BUNCREA 14.7 11/21/2019    CREATSERUM 1.04 05/08/2024    ANIONGAP 9 05/08/2024    GFRNAA 85 03/14/2022    GFRAA 99 03/14/2022    CA 9.3 05/08/2024    OSMOCALC 292 05/08/2024    ALKPHO 96 04/28/2024    AST 33 04/28/2024    ALT 48 04/28/2024    BILT 2.7 (H) 04/28/2024    TP 7.9 04/28/2024    ALB 4.0 04/28/2024    GLOBULIN 3.9 04/28/2024     05/08/2024    K 3.9 05/08/2024     05/08/2024    CO2 24.0 05/08/2024      Lab Results   Component Value Date    CHOLEST 156 04/28/2024    TRIG 115 04/28/2024    HDL 51 04/28/2024    LDL 84 04/28/2024    VLDL 18 04/28/2024    NONHDLC 105 04/28/2024      Lab Results   Component Value Date    TSH 1.400 04/28/2024      Lab Results   Component Value Date    EAG 94 11/21/2019    A1C 4.9 11/21/2019          ASSESSMENT AND PLAN:    1. Preop exam for internal medicine  2. Chronic pain of left knee  -  Left knee pain scheduled for a left total knee arthroplasty  planned for 10/4/24 with general anesthesia.    The pt has a hx of afib and CHF followed by cardiology. He therefore can proceed with the surgery with acceptable cardiac risk once he is cleared by cardiology as well.       3. Atrial fibrillation with rapid ventricular response (HCC)  - continue management per cardiology. Medications managed by cardiology should be held and restarted by them.     4. Chronic heart failure, unspecified heart failure type (HCC)  - - continue management per cardiology     5. Essential hypertension  - - continue management per cardiology     6. Mixed hyperlipidemia  - continue management per cardiology     7. Class 2 severe obesity due to excess calories with serious comorbidity and body mass index (BMI) of 39.0 to 39.9 in adult (HCC)  - lifestyle changes discussed     8. LENARD (obstructive sleep apnea)  - is on cpap. 02 should be monitored during and after surgery closely.           This consult will be sent back to the referring physician once labs are available.          DHIRAJ Magallanes

## 2024-09-04 ENCOUNTER — LAB ENCOUNTER (OUTPATIENT)
Dept: LAB | Age: 59
End: 2024-09-04
Attending: INTERNAL MEDICINE
Payer: COMMERCIAL

## 2024-09-04 DIAGNOSIS — Z13.220 SCREENING FOR CHOLESTEROL LEVEL: ICD-10-CM

## 2024-09-04 DIAGNOSIS — Z12.5 SCREENING FOR PROSTATE CANCER: ICD-10-CM

## 2024-09-04 DIAGNOSIS — Z13.29 SCREENING FOR THYROID DISORDER: ICD-10-CM

## 2024-09-04 DIAGNOSIS — Z00.00 PHYSICAL EXAM: ICD-10-CM

## 2024-09-04 LAB
ALBUMIN SERPL-MCNC: 4.7 G/DL (ref 3.2–4.8)
ALBUMIN/GLOB SERPL: 1.4 {RATIO} (ref 1–2)
ALP LIVER SERPL-CCNC: 98 U/L
ALT SERPL-CCNC: 28 U/L
ANION GAP SERPL CALC-SCNC: 9 MMOL/L (ref 0–18)
AST SERPL-CCNC: 44 U/L (ref ?–34)
BASOPHILS # BLD AUTO: 0.04 X10(3) UL (ref 0–0.2)
BASOPHILS NFR BLD AUTO: 0.8 %
BILIRUB SERPL-MCNC: 1.3 MG/DL (ref 0.3–1.2)
BUN BLD-MCNC: 13 MG/DL (ref 9–23)
BUN/CREAT SERPL: 13.1 (ref 10–20)
CALCIUM BLD-MCNC: 9.2 MG/DL (ref 8.7–10.4)
CHLORIDE SERPL-SCNC: 105 MMOL/L (ref 98–112)
CHOLEST SERPL-MCNC: 213 MG/DL (ref ?–200)
CO2 SERPL-SCNC: 30 MMOL/L (ref 21–32)
COMPLEXED PSA SERPL-MCNC: 0.72 NG/ML (ref ?–4)
CREAT BLD-MCNC: 0.99 MG/DL
DEPRECATED RDW RBC AUTO: 45.6 FL (ref 35.1–46.3)
EGFRCR SERPLBLD CKD-EPI 2021: 88 ML/MIN/1.73M2 (ref 60–?)
EOSINOPHIL # BLD AUTO: 0.13 X10(3) UL (ref 0–0.7)
EOSINOPHIL NFR BLD AUTO: 2.5 %
ERYTHROCYTE [DISTWIDTH] IN BLOOD BY AUTOMATED COUNT: 12.7 % (ref 11–15)
FASTING PATIENT LIPID ANSWER: YES
FASTING STATUS PATIENT QL REPORTED: YES
GLOBULIN PLAS-MCNC: 3.4 G/DL (ref 2–3.5)
GLUCOSE BLD-MCNC: 85 MG/DL (ref 70–99)
HCT VFR BLD AUTO: 44.9 %
HDLC SERPL-MCNC: 49 MG/DL (ref 40–59)
HGB BLD-MCNC: 16.1 G/DL
IMM GRANULOCYTES # BLD AUTO: 0.02 X10(3) UL (ref 0–1)
IMM GRANULOCYTES NFR BLD: 0.4 %
LDLC SERPL CALC-MCNC: 135 MG/DL (ref ?–100)
LYMPHOCYTES # BLD AUTO: 0.85 X10(3) UL (ref 1–4)
LYMPHOCYTES NFR BLD AUTO: 16.3 %
MCH RBC QN AUTO: 35.2 PG (ref 26–34)
MCHC RBC AUTO-ENTMCNC: 35.9 G/DL (ref 31–37)
MCV RBC AUTO: 98 FL
MONOCYTES # BLD AUTO: 0.45 X10(3) UL (ref 0.1–1)
MONOCYTES NFR BLD AUTO: 8.6 %
NEUTROPHILS # BLD AUTO: 3.74 X10 (3) UL (ref 1.5–7.7)
NEUTROPHILS # BLD AUTO: 3.74 X10(3) UL (ref 1.5–7.7)
NEUTROPHILS NFR BLD AUTO: 71.4 %
NONHDLC SERPL-MCNC: 164 MG/DL (ref ?–130)
OSMOLALITY SERPL CALC.SUM OF ELEC: 297 MOSM/KG (ref 275–295)
PLATELET # BLD AUTO: 144 10(3)UL (ref 150–450)
POTASSIUM SERPL-SCNC: 4.2 MMOL/L (ref 3.5–5.1)
PROT SERPL-MCNC: 8.1 G/DL (ref 5.7–8.2)
RBC # BLD AUTO: 4.58 X10(6)UL
SODIUM SERPL-SCNC: 144 MMOL/L (ref 136–145)
TRIGL SERPL-MCNC: 162 MG/DL (ref 30–149)
TSI SER-ACNC: 2.05 MIU/ML (ref 0.55–4.78)
VLDLC SERPL CALC-MCNC: 30 MG/DL (ref 0–30)
WBC # BLD AUTO: 5.2 X10(3) UL (ref 4–11)

## 2024-09-04 PROCEDURE — 84153 ASSAY OF PSA TOTAL: CPT | Performed by: NURSE PRACTITIONER

## 2024-09-04 PROCEDURE — 80050 GENERAL HEALTH PANEL: CPT | Performed by: NURSE PRACTITIONER

## 2024-09-04 PROCEDURE — 80061 LIPID PANEL: CPT | Performed by: NURSE PRACTITIONER

## 2024-10-04 ENCOUNTER — LAB ENCOUNTER (OUTPATIENT)
Dept: LAB | Age: 59
End: 2024-10-04
Attending: STUDENT IN AN ORGANIZED HEALTH CARE EDUCATION/TRAINING PROGRAM
Payer: COMMERCIAL

## 2024-10-04 DIAGNOSIS — Z01.818 PRE-OP TESTING: ICD-10-CM

## 2024-10-04 LAB — MRSA DNA SPEC QL NAA+PROBE: NEGATIVE

## 2024-10-04 PROCEDURE — 87641 MR-STAPH DNA AMP PROBE: CPT

## 2024-10-04 NOTE — DISCHARGE INSTRUCTIONS
HOME INSTRUCTIONS  [facility]G HOME CARE INSTRUCTIONS: POST-OP ANESTHESIA  The medication that you received for sedation or general anesthesia can last up to 24 hours. Your judgment and reflexes may be altered, even if you feel like your normal self.      We Recommend:   Do not drive any motor vehicle or bicycle   Avoid mowing the lawn, playing sports, or working with power tools/applicances (power saws, electric knives or mixers)   That you have someone stay with you on your first night home   Do not drink alcohol or take sleeping pills or tranquilizers   Do not sign legal documents within 24 hours of your procedure   If you had a nerve block for your surgery, take extra care not to put any pressure on your arm or hand for 24 hours    It is normal:  For you to have a sore throat if you had a breathing tube during surgery (while you were asleep!). The sore throat should get better within 48 hours. You can gargle with warm salt water (1/2 tsp in 4 oz warm water) or use a throat lozenge for comfort  To feel muscle aches or soreness especially in the abdomen, chest or neck. The achy feeling should go away in the next 24 hours  To feel weak, sleepy or \"wiped out\". Your should start feeling better in the next 24 hours.   To experience mild discomforts such as sore lip or tongue, headache, cramps, gas pains or a bloated feeling in your abdomen.   To experience mild back pain or soreness for a day or two if you had spinal or epidural anesthesia.   If you had laparoscopic surgery, to feel shoulder pain or discomfort on the day of surgery.   For some patients to have nausea after surgery/anesthesia    If you feel nausea or experience vomiting:   Try to move around less.   Eat less than usual or drink only liquids until the next morning   Nausea should resolve in about 24 hours    If you have a problem when you are at home:    Call your surgeons office   Discharge Instructions: After Your Surgery  You’ve just had surgery. During  surgery, you were given medicine called anesthesia to keep you relaxed and free of pain. After surgery, you may have some pain or nausea. This is common. Here are some tips for feeling better and getting well after surgery.   Going home  Your healthcare provider will show you how to take care of yourself when you go home. They'll also answer your questions. Have an adult family member or friend drive you home. For the first 24 hours after your surgery:   Don't drive or use heavy equipment.  Don't make important decisions or sign legal papers.  Take medicines as directed.  Don't drink alcohol.  Have someone stay with you, if needed. They can watch for problems and help keep you safe.  Be sure to go to all follow-up visits with your healthcare provider. And rest after your surgery for as long as your provider tells you to.   Coping with pain  If you have pain after surgery, pain medicine will help you feel better. Take it as directed, before pain becomes severe. Also, ask your healthcare provider or pharmacist about other ways to control pain. This might be with heat, ice, or relaxation. And follow any other instructions your surgeon or nurse gives you.      Stay on schedule with your medicine.     Tips for taking pain medicine  To get the best relief possible, remember these points:   Pain medicines can upset your stomach. Taking them with a little food may help.  Most pain relievers taken by mouth need at least 20 to 30 minutes to start to work.  Don't wait till your pain becomes severe before you take your medicine. Try to time your medicine so that you can take it before starting an activity. This might be before you get dressed, go for a walk, or sit down for dinner.  Constipation is a common side effect of some pain medicines. Call your healthcare provider before taking any medicines such as laxatives or stool softeners to help ease constipation. Also ask if you should skip any foods. Drinking lots of fluids and  eating foods such as fruits and vegetables that are high in fiber can also help. Remember, don't take laxatives unless your surgeon has prescribed them.  Drinking alcohol and taking pain medicine can cause dizziness and slow your breathing. It can even be deadly. Don't drink alcohol while taking pain medicine.  Pain medicine can make you react more slowly to things. Don't drive or run machinery while taking pain medicine.  Your healthcare provider may tell you to take acetaminophen to help ease your pain. Ask them how much you're supposed to take each day. Acetaminophen or other pain relievers may interact with your prescription medicines or other over-the-counter (OTC) medicines. Some prescription medicines have acetaminophen and other ingredients in them. Using both prescription and OTC acetaminophen for pain can cause you to accidentally overdose. Read the labels on your OTC medicines with care. This will help you to clearly know the list of ingredients, how much to take, and any warnings. It may also help you not take too much acetaminophen. If you have questions or don't understand the information, ask your pharmacist or healthcare provider to explain it to you before you take the OTC medicine.   Managing nausea  Some people have an upset stomach (nausea) after surgery. This is often because of anesthesia, pain, or pain medicine, less movement of food in the stomach, or the stress of surgery. These tips will help you handle nausea and eat healthy foods as you get better. If you were on a special food plan before surgery, ask your healthcare provider if you should follow it while you get better. Check with your provider on how your eating should progress. It may depend on the surgery you had. These general tips may help:   Don't push yourself to eat. Your body will tell you when to eat and how much.  Start off with clear liquids and soup. They're easier to digest.  Next try semi-solid foods as you feel ready.  These include mashed potatoes, applesauce, and gelatin.  Slowly move to solid foods. Don’t eat fatty, rich, or spicy foods at first.  Don't force yourself to have 3 large meals a day. Instead eat smaller amounts more often.  Take pain medicines with a small amount of solid food, such as crackers or toast. This helps prevent nausea.  When to call your healthcare provider  Call your healthcare provider right away if you have any of these:   You still have too much pain, or the pain gets worse, after taking the medicine. The medicine may not be strong enough. Or there may be a complication from the surgery.  You feel too sleepy, dizzy, or groggy. The medicine may be too strong.  Side effects such as nausea or vomiting. Your healthcare provider may advise taking other medicines to .  Skin changes such as rash, itching, or hives. This may mean you have an allergic reaction. Your provider may advise taking other medicines.  The incision looks different (for instance, part of it opens up).  Bleeding or fluid leaking from the incision site, and weren't told to expect that.  Fever of 100.4°F (38°C) or higher, or as directed by your provider.  Call 911  Call 911 right away if you have:   Trouble breathing  Facial swelling    If you have obstructive sleep apnea   You were given anesthesia medicine during surgery to keep you comfortable and free of pain. After surgery, you may have more apnea spells because of this medicine and other medicines you were given. The spells may last longer than normal.    At home:  Keep using the continuous positive airway pressure (CPAP) device when you sleep. Unless your healthcare provider tells you not to, use it when you sleep, day or night. CPAP is a common device used to treat obstructive sleep apnea.  Talk with your provider before taking any pain medicine, muscle relaxants, or sedatives. Your provider will tell you about the possible dangers of taking these medicines.  Contact your  provider if your sleeping changes a lot even when taking medicines as directed.  [employer] last reviewed this educational content on 10/1/2021  © 9207-3330 [employer]. All rights reserved. This information is not intended as a substitute for professional medical care. Always follow your healthcare professional's instructions.      DISCHARGE INSTRUCTIONS:  PLACE ICE TO SURGICAL AREA 20-30 MINUTES ON/ 20-30 MINUTES OFF. THIS IS TO HELP WITH PAIN & SWELLING.    TAKE YOUR MEDICATIONS BELOW AS PRESCRIBED. THESE HAVE BEEN SENT TO YOUR PHARMACY.    IT IS OK TO BEAR WEIGHT AS TOLERATED ON THE OPERATIVE EXTREMITY.         MEDICATIONS:    POST OP MEDICATION REGIMEN              MULTI-MODAL   PAIN REGIMEN    *Take 1st dose upon arrival home.    DRUG   FOR   FREQUENCY & DURATION   QTY   NOTES     WEANING  TIPS       Tylenol 500mg     Mild pain   Take 2 tabs every 6 hours     126   Can purchase over-the-counter    Stop using medication if no longer having pain.      Tramadol 50mg     Moderate pain   Take 1-2 tabs every 6 hours only as needed   30 May prescribe a refill, but ideally, this should be tapered off after surgery Stop using this medication 2nd   As pain decreases over time, increase the interval between doses (6 hours to 8, then 12, then 24) to taper off the medication.      Celebrex 200 mg     Inflammation   Take 1 tab daily for 30 days     30   May refill if needed beyond 30 days   Recommend completing the 30 day supply.       BREAKTHROUGH PAIN         Oxycodone 5mg       Severe pain     Take 1-2 tabs every 4-6 hours only as needed for severe pain       40   Take at least 1 hr apart from Tramadol.    Ideally, no refill. The goal is to taper off this medication as soon as possible, as it can be addictive and have side effects.    Stop using this medication 1st if no longer having severe pain.     BLOOD THINNER  *1st dose after surgery at 6pm*   Aspirin 81mg   Blood clot prevention   Take 1 tab twice  daily for 42 days     84     No refills   Complete the entire course of your blood thinner.     STOOL SOFTENER     Colace 100 mg   Constipation   Take 1 tab twice daily  while on opioids     60 Refer to discharge instructions if constipation persists even after taking this medication   Stop taking if having diarrhea or loose stools.         DRESSING:    YOU MAY REMOVE ACE BANDAGE AND COTTON WRAP 2  DAYS AFTER SURGERY    YOU HAVE A SPECIAL DRESSING CALLED AN AQUACEL DRESSING OVER YOUR INCISION.    THE DRESSING STAYS FOR 12 DAYS FROM SURGERY.    YOU MAY SHOWER AS SOON AS YOU RETURN HOME WITH THE AQUACEL DRESSING INTACT OVER YOUR INCISION AREA.    IF THE DRESSING LEAKS OR COMES LOOSE BEFORE THE 7 DAY PERIOD CONTACT YOUR DOCTOR / SURGEON.    AFTER   12 DAYS THE DRESSING IS REMOVED BY PULLING ON THE EDGE OF THE DRESSING AND GENTLY STRETCHING IT, THEN PEEL IT OFF SLOWLY LIKE A BANDAID. IF YOU HAVE ANY QUESTIONS OR CONCERNS ABOUT THE DRESSING CONTACT YOUR DOCTOR.    IF DRAINAGE IS PRESENT AT ANYTIME FROM YOUR DRESSING OR FROM YOUR INCISION CALL YOUR DOCTOR / SURGEON AS SOON AS POSSIBLE.      REASONS TO CALL YOUR DOCTOR:  TEMPERATURE .0 OR MORE  PERSISTANT NAUSEA OR VOMITTING - ESPECIALLY IF YOU ARE UNABLE TO EAT OR DRINK.  INCREASED LEVEL OF PAIN OR PAIN WHICH IS NOT CONTROLLED BY YOUR PAIN MEDICINE.  PERSISTENT DRAINAGE AT ANYTIME FROM YOUR SURGICAL AREA / INCISION.  PAIN, TENDERNESS OR SUDDEN SWELLING IN YOUR CALF REGION OF THE LOWER EXTREMITIES - THIS IS A POSSIBLE SIGN OF A BLOOD CLOT.  ANY CHANGES IN SENSATION IN YOUR BODY IN THE AREA OF YOUR SURGERY = NUMBNESS OR TINGLING.  ANY CHANGES IN CIRCULATION IN YOUR BODY IN THE AREA OF YOUR SURGERY = CHANGES  IN COLOR EITHER DARKER OR VERY PALE; OR  IF AREA FEELS COLD TO THE TOUCH AS COMPARED TO OTHER AREAS.  ANY CHANGES IN FUNCTION IN YOUR BODY IN THE AREA OF YOUR SURGERY = CHANGE IN MOVEMENT - UNABLE TO MOVE OR WIGGLE FINGERS, TOES OR FOOT OR ANY OTHER CHANGES IN  NORMAL BODY FUNCTIONING.  FOR ANY OF THE ABOVE OR ANY OTHER QUESTIONS OR CONCERNS CALL YOUR DOCTOR/SURGEON AS SOON AS POSSIBLE.

## 2024-10-06 ENCOUNTER — ANESTHESIA EVENT (OUTPATIENT)
Dept: SURGERY | Facility: HOSPITAL | Age: 59
End: 2024-10-06
Payer: COMMERCIAL

## 2024-10-07 ENCOUNTER — ANESTHESIA (OUTPATIENT)
Dept: SURGERY | Facility: HOSPITAL | Age: 59
End: 2024-10-07
Payer: COMMERCIAL

## 2024-10-07 ENCOUNTER — APPOINTMENT (OUTPATIENT)
Dept: GENERAL RADIOLOGY | Facility: HOSPITAL | Age: 59
End: 2024-10-07
Attending: STUDENT IN AN ORGANIZED HEALTH CARE EDUCATION/TRAINING PROGRAM
Payer: COMMERCIAL

## 2024-10-07 ENCOUNTER — HOSPITAL ENCOUNTER (OUTPATIENT)
Facility: HOSPITAL | Age: 59
Discharge: HOME | End: 2024-10-08
Attending: STUDENT IN AN ORGANIZED HEALTH CARE EDUCATION/TRAINING PROGRAM | Admitting: STUDENT IN AN ORGANIZED HEALTH CARE EDUCATION/TRAINING PROGRAM
Payer: COMMERCIAL

## 2024-10-07 DIAGNOSIS — Z09 POSTOP CHECK: ICD-10-CM

## 2024-10-07 DIAGNOSIS — Z01.818 PRE-OP TESTING: Primary | ICD-10-CM

## 2024-10-07 PROBLEM — M17.12 PRIMARY OSTEOARTHRITIS OF LEFT KNEE: Status: ACTIVE | Noted: 2024-10-07

## 2024-10-07 PROBLEM — E66.01 MORBID OBESITY WITH BMI OF 40.0-44.9, ADULT (HCC): Status: ACTIVE | Noted: 2024-10-07

## 2024-10-07 PROBLEM — I42.8 NON-ISCHEMIC CARDIOMYOPATHY (HCC): Status: ACTIVE | Noted: 2024-10-07

## 2024-10-07 PROBLEM — E66.01 MORBID OBESITY WITH BMI OF 40.0-44.9, ADULT (HCC): Status: RESOLVED | Noted: 2024-10-07 | Resolved: 2024-10-07

## 2024-10-07 PROBLEM — E66.01 MORBID OBESITY (HCC): Status: ACTIVE | Noted: 2024-10-07

## 2024-10-07 PROCEDURE — 73560 X-RAY EXAM OF KNEE 1 OR 2: CPT | Performed by: STUDENT IN AN ORGANIZED HEALTH CARE EDUCATION/TRAINING PROGRAM

## 2024-10-07 PROCEDURE — 0SRD0J9 REPLACEMENT OF LEFT KNEE JOINT WITH SYNTHETIC SUBSTITUTE, CEMENTED, OPEN APPROACH: ICD-10-PCS | Performed by: STUDENT IN AN ORGANIZED HEALTH CARE EDUCATION/TRAINING PROGRAM

## 2024-10-07 PROCEDURE — 99204 OFFICE O/P NEW MOD 45 MIN: CPT | Performed by: HOSPITALIST

## 2024-10-07 DEVICE — IMPLANTABLE DEVICE
Type: IMPLANTABLE DEVICE | Site: KNEE | Status: FUNCTIONAL
Brand: PERSONA®

## 2024-10-07 DEVICE — IMPLANTABLE DEVICE
Type: IMPLANTABLE DEVICE | Site: KNEE | Status: FUNCTIONAL
Brand: PERSONA® VIVACIT-E®

## 2024-10-07 DEVICE — IMPLANTABLE DEVICE
Type: IMPLANTABLE DEVICE | Site: KNEE | Status: FUNCTIONAL
Brand: PERSONA® PPS®

## 2024-10-07 DEVICE — IMPLANTABLE DEVICE
Type: IMPLANTABLE DEVICE | Site: KNEE | Status: FUNCTIONAL
Brand: PERSONA® THE PERSONALIZED KNEE® OSSEOTI®

## 2024-10-07 DEVICE — IMPLANTABLE DEVICE
Type: IMPLANTABLE DEVICE | Site: KNEE | Status: FUNCTIONAL
Brand: BIOMET® BONE CEMENT R

## 2024-10-07 RX ORDER — SPIRONOLACTONE 25 MG/1
25 TABLET ORAL DAILY
Status: DISCONTINUED | OUTPATIENT
Start: 2024-10-07 | End: 2024-10-08

## 2024-10-07 RX ORDER — ONDANSETRON 2 MG/ML
4 INJECTION INTRAMUSCULAR; INTRAVENOUS EVERY 6 HOURS PRN
Status: DISCONTINUED | OUTPATIENT
Start: 2024-10-07 | End: 2024-10-07 | Stop reason: HOSPADM

## 2024-10-07 RX ORDER — HYDROMORPHONE HYDROCHLORIDE 1 MG/ML
0.4 INJECTION, SOLUTION INTRAMUSCULAR; INTRAVENOUS; SUBCUTANEOUS EVERY 5 MIN PRN
Status: DISCONTINUED | OUTPATIENT
Start: 2024-10-07 | End: 2024-10-07 | Stop reason: HOSPADM

## 2024-10-07 RX ORDER — ACETAMINOPHEN 500 MG
1000 TABLET ORAL ONCE
Status: COMPLETED | OUTPATIENT
Start: 2024-10-07 | End: 2024-10-07

## 2024-10-07 RX ORDER — SODIUM CHLORIDE, SODIUM LACTATE, POTASSIUM CHLORIDE, CALCIUM CHLORIDE 600; 310; 30; 20 MG/100ML; MG/100ML; MG/100ML; MG/100ML
INJECTION, SOLUTION INTRAVENOUS CONTINUOUS
Status: DISCONTINUED | OUTPATIENT
Start: 2024-10-07 | End: 2024-10-08

## 2024-10-07 RX ORDER — OXYCODONE HYDROCHLORIDE 5 MG/1
10 TABLET ORAL EVERY 4 HOURS PRN
Status: DISCONTINUED | OUTPATIENT
Start: 2024-10-07 | End: 2024-10-08

## 2024-10-07 RX ORDER — SODIUM CHLORIDE, SODIUM LACTATE, POTASSIUM CHLORIDE, CALCIUM CHLORIDE 600; 310; 30; 20 MG/100ML; MG/100ML; MG/100ML; MG/100ML
INJECTION, SOLUTION INTRAVENOUS CONTINUOUS
Status: DISCONTINUED | OUTPATIENT
Start: 2024-10-07 | End: 2024-10-07 | Stop reason: HOSPADM

## 2024-10-07 RX ORDER — HYDROMORPHONE HYDROCHLORIDE 1 MG/ML
0.2 INJECTION, SOLUTION INTRAMUSCULAR; INTRAVENOUS; SUBCUTANEOUS EVERY 5 MIN PRN
Status: DISCONTINUED | OUTPATIENT
Start: 2024-10-07 | End: 2024-10-07 | Stop reason: HOSPADM

## 2024-10-07 RX ORDER — ACETAMINOPHEN 10 MG/ML
1000 INJECTION, SOLUTION INTRAVENOUS ONCE
Status: COMPLETED | OUTPATIENT
Start: 2024-10-07 | End: 2024-10-08

## 2024-10-07 RX ORDER — KETOROLAC TROMETHAMINE 15 MG/ML
15 INJECTION, SOLUTION INTRAMUSCULAR; INTRAVENOUS EVERY 6 HOURS
Status: DISCONTINUED | OUTPATIENT
Start: 2024-10-07 | End: 2024-10-08

## 2024-10-07 RX ORDER — METHOCARBAMOL 100 MG/ML
1000 INJECTION, SOLUTION INTRAMUSCULAR; INTRAVENOUS ONCE
Status: COMPLETED | OUTPATIENT
Start: 2024-10-07 | End: 2024-10-07

## 2024-10-07 RX ORDER — OXYCODONE HYDROCHLORIDE 5 MG/1
5 TABLET ORAL EVERY 4 HOURS PRN
Status: DISCONTINUED | OUTPATIENT
Start: 2024-10-07 | End: 2024-10-08

## 2024-10-07 RX ORDER — METOPROLOL TARTRATE 25 MG/1
25 TABLET, FILM COATED ORAL ONCE AS NEEDED
Status: DISCONTINUED | OUTPATIENT
Start: 2024-10-07 | End: 2024-10-07 | Stop reason: HOSPADM

## 2024-10-07 RX ORDER — ONDANSETRON 2 MG/ML
INJECTION INTRAMUSCULAR; INTRAVENOUS AS NEEDED
Status: DISCONTINUED | OUTPATIENT
Start: 2024-10-07 | End: 2024-10-07 | Stop reason: SURG

## 2024-10-07 RX ORDER — DEXAMETHASONE SODIUM PHOSPHATE 10 MG/ML
INJECTION, SOLUTION INTRAMUSCULAR; INTRAVENOUS
Status: COMPLETED | OUTPATIENT
Start: 2024-10-07 | End: 2024-10-07

## 2024-10-07 RX ORDER — LIDOCAINE HYDROCHLORIDE 10 MG/ML
INJECTION, SOLUTION INFILTRATION; PERINEURAL
Status: COMPLETED | OUTPATIENT
Start: 2024-10-07 | End: 2024-10-07

## 2024-10-07 RX ORDER — HYDROMORPHONE HYDROCHLORIDE 1 MG/ML
0.8 INJECTION, SOLUTION INTRAMUSCULAR; INTRAVENOUS; SUBCUTANEOUS EVERY 2 HOUR PRN
Status: DISCONTINUED | OUTPATIENT
Start: 2024-10-07 | End: 2024-10-08

## 2024-10-07 RX ORDER — ROPIVACAINE HYDROCHLORIDE 5 MG/ML
INJECTION, SOLUTION EPIDURAL; INFILTRATION; PERINEURAL
Status: COMPLETED | OUTPATIENT
Start: 2024-10-07 | End: 2024-10-07

## 2024-10-07 RX ORDER — DOCUSATE SODIUM 100 MG/1
100 CAPSULE, LIQUID FILLED ORAL 2 TIMES DAILY
Status: DISCONTINUED | OUTPATIENT
Start: 2024-10-07 | End: 2024-10-08

## 2024-10-07 RX ORDER — HYDROMORPHONE HYDROCHLORIDE 1 MG/ML
0.4 INJECTION, SOLUTION INTRAMUSCULAR; INTRAVENOUS; SUBCUTANEOUS EVERY 2 HOUR PRN
Status: DISCONTINUED | OUTPATIENT
Start: 2024-10-07 | End: 2024-10-08

## 2024-10-07 RX ORDER — ROCURONIUM BROMIDE 10 MG/ML
INJECTION, SOLUTION INTRAVENOUS AS NEEDED
Status: DISCONTINUED | OUTPATIENT
Start: 2024-10-07 | End: 2024-10-07 | Stop reason: SURG

## 2024-10-07 RX ORDER — BISACODYL 10 MG
10 SUPPOSITORY, RECTAL RECTAL
Status: DISCONTINUED | OUTPATIENT
Start: 2024-10-07 | End: 2024-10-08

## 2024-10-07 RX ORDER — SENNOSIDES 8.6 MG
17.2 TABLET ORAL NIGHTLY
Status: DISCONTINUED | OUTPATIENT
Start: 2024-10-07 | End: 2024-10-08

## 2024-10-07 RX ORDER — DOXEPIN HYDROCHLORIDE 50 MG/1
1 CAPSULE ORAL DAILY
Status: DISCONTINUED | OUTPATIENT
Start: 2024-10-08 | End: 2024-10-08

## 2024-10-07 RX ORDER — PROCHLORPERAZINE EDISYLATE 5 MG/ML
5 INJECTION INTRAMUSCULAR; INTRAVENOUS EVERY 8 HOURS PRN
Status: DISCONTINUED | OUTPATIENT
Start: 2024-10-07 | End: 2024-10-08

## 2024-10-07 RX ORDER — FAMOTIDINE 10 MG/ML
20 INJECTION, SOLUTION INTRAVENOUS ONCE
Status: COMPLETED | OUTPATIENT
Start: 2024-10-07 | End: 2024-10-07

## 2024-10-07 RX ORDER — METOCLOPRAMIDE HYDROCHLORIDE 5 MG/ML
10 INJECTION INTRAMUSCULAR; INTRAVENOUS ONCE
Status: COMPLETED | OUTPATIENT
Start: 2024-10-07 | End: 2024-10-07

## 2024-10-07 RX ORDER — ONDANSETRON 2 MG/ML
4 INJECTION INTRAMUSCULAR; INTRAVENOUS EVERY 6 HOURS PRN
Status: DISCONTINUED | OUTPATIENT
Start: 2024-10-07 | End: 2024-10-07

## 2024-10-07 RX ORDER — CEFAZOLIN SODIUM IN 0.9 % NACL 3 G/100 ML
3 INTRAVENOUS SOLUTION, PIGGYBACK (ML) INTRAVENOUS EVERY 8 HOURS
Status: COMPLETED | OUTPATIENT
Start: 2024-10-07 | End: 2024-10-07

## 2024-10-07 RX ORDER — CEFAZOLIN SODIUM IN 0.9 % NACL 3 G/100 ML
INTRAVENOUS SOLUTION, PIGGYBACK (ML) INTRAVENOUS AS NEEDED
Status: DISCONTINUED | OUTPATIENT
Start: 2024-10-07 | End: 2024-10-07 | Stop reason: SURG

## 2024-10-07 RX ORDER — TRANEXAMIC ACID 10 MG/ML
INJECTION, SOLUTION INTRAVENOUS AS NEEDED
Status: DISCONTINUED | OUTPATIENT
Start: 2024-10-07 | End: 2024-10-07 | Stop reason: SURG

## 2024-10-07 RX ORDER — ONDANSETRON 2 MG/ML
4 INJECTION INTRAMUSCULAR; INTRAVENOUS EVERY 6 HOURS PRN
Status: DISCONTINUED | OUTPATIENT
Start: 2024-10-07 | End: 2024-10-08

## 2024-10-07 RX ORDER — LABETALOL HYDROCHLORIDE 5 MG/ML
10 INJECTION, SOLUTION INTRAVENOUS ONCE
Status: COMPLETED | OUTPATIENT
Start: 2024-10-07 | End: 2024-10-07

## 2024-10-07 RX ORDER — ACETAMINOPHEN 500 MG
1000 TABLET ORAL EVERY 8 HOURS SCHEDULED
Status: DISCONTINUED | OUTPATIENT
Start: 2024-10-07 | End: 2024-10-08

## 2024-10-07 RX ORDER — KETOROLAC TROMETHAMINE 15 MG/ML
INJECTION, SOLUTION INTRAMUSCULAR; INTRAVENOUS AS NEEDED
Status: DISCONTINUED | OUTPATIENT
Start: 2024-10-07 | End: 2024-10-07 | Stop reason: SURG

## 2024-10-07 RX ORDER — NALOXONE HYDROCHLORIDE 0.4 MG/ML
0.08 INJECTION, SOLUTION INTRAMUSCULAR; INTRAVENOUS; SUBCUTANEOUS AS NEEDED
Status: DISCONTINUED | OUTPATIENT
Start: 2024-10-07 | End: 2024-10-07 | Stop reason: HOSPADM

## 2024-10-07 RX ORDER — SODIUM PHOSPHATE, DIBASIC AND SODIUM PHOSPHATE, MONOBASIC 7; 19 G/230ML; G/230ML
1 ENEMA RECTAL ONCE AS NEEDED
Status: DISCONTINUED | OUTPATIENT
Start: 2024-10-07 | End: 2024-10-08

## 2024-10-07 RX ORDER — MIDAZOLAM HYDROCHLORIDE 1 MG/ML
INJECTION INTRAMUSCULAR; INTRAVENOUS
Status: COMPLETED | OUTPATIENT
Start: 2024-10-07 | End: 2024-10-07

## 2024-10-07 RX ORDER — FAMOTIDINE 20 MG/1
20 TABLET, FILM COATED ORAL ONCE
Status: COMPLETED | OUTPATIENT
Start: 2024-10-07 | End: 2024-10-07

## 2024-10-07 RX ORDER — TORSEMIDE 20 MG/1
20 TABLET ORAL DAILY
Status: DISCONTINUED | OUTPATIENT
Start: 2024-10-07 | End: 2024-10-08

## 2024-10-07 RX ORDER — LIDOCAINE HYDROCHLORIDE 10 MG/ML
INJECTION, SOLUTION EPIDURAL; INFILTRATION; INTRACAUDAL; PERINEURAL AS NEEDED
Status: DISCONTINUED | OUTPATIENT
Start: 2024-10-07 | End: 2024-10-07 | Stop reason: SURG

## 2024-10-07 RX ORDER — DEXAMETHASONE SODIUM PHOSPHATE 4 MG/ML
VIAL (ML) INJECTION AS NEEDED
Status: DISCONTINUED | OUTPATIENT
Start: 2024-10-07 | End: 2024-10-07 | Stop reason: SURG

## 2024-10-07 RX ORDER — METOCLOPRAMIDE 10 MG/1
10 TABLET ORAL ONCE
Status: COMPLETED | OUTPATIENT
Start: 2024-10-07 | End: 2024-10-07

## 2024-10-07 RX ORDER — PHENYLEPHRINE HCL 10 MG/ML
VIAL (ML) INJECTION AS NEEDED
Status: DISCONTINUED | OUTPATIENT
Start: 2024-10-07 | End: 2024-10-07 | Stop reason: SURG

## 2024-10-07 RX ORDER — POLYETHYLENE GLYCOL 3350 17 G/17G
17 POWDER, FOR SOLUTION ORAL DAILY PRN
Status: DISCONTINUED | OUTPATIENT
Start: 2024-10-07 | End: 2024-10-08

## 2024-10-07 RX ORDER — VANCOMYCIN HYDROCHLORIDE 1 G/20ML
INJECTION, POWDER, LYOPHILIZED, FOR SOLUTION INTRAVENOUS AS NEEDED
Status: DISCONTINUED | OUTPATIENT
Start: 2024-10-07 | End: 2024-10-07 | Stop reason: HOSPADM

## 2024-10-07 RX ORDER — METOPROLOL SUCCINATE 100 MG/1
100 TABLET, EXTENDED RELEASE ORAL
Status: DISCONTINUED | OUTPATIENT
Start: 2024-10-07 | End: 2024-10-08

## 2024-10-07 RX ORDER — CEFAZOLIN SODIUM IN 0.9 % NACL 3 G/100 ML
3 INTRAVENOUS SOLUTION, PIGGYBACK (ML) INTRAVENOUS ONCE
Status: DISCONTINUED | OUTPATIENT
Start: 2024-10-07 | End: 2024-10-07 | Stop reason: HOSPADM

## 2024-10-07 RX ADMIN — ONDANSETRON 4 MG: 2 INJECTION INTRAMUSCULAR; INTRAVENOUS at 09:18:00

## 2024-10-07 RX ADMIN — DEXAMETHASONE SODIUM PHOSPHATE 8 MG: 4 MG/ML VIAL (ML) INJECTION at 07:50:00

## 2024-10-07 RX ADMIN — PHENYLEPHRINE HCL 100 MCG: 10 MG/ML VIAL (ML) INJECTION at 08:05:00

## 2024-10-07 RX ADMIN — ROPIVACAINE HYDROCHLORIDE 30 ML: 5 INJECTION, SOLUTION EPIDURAL; INFILTRATION; PERINEURAL at 07:12:00

## 2024-10-07 RX ADMIN — LIDOCAINE HYDROCHLORIDE 5 ML: 10 INJECTION, SOLUTION INFILTRATION; PERINEURAL at 07:05:00

## 2024-10-07 RX ADMIN — TRANEXAMIC ACID 1000 MG: 10 INJECTION, SOLUTION INTRAVENOUS at 07:48:00

## 2024-10-07 RX ADMIN — MIDAZOLAM HYDROCHLORIDE 2 MG: 1 INJECTION INTRAMUSCULAR; INTRAVENOUS at 07:05:00

## 2024-10-07 RX ADMIN — CEFAZOLIN SODIUM IN 0.9 % NACL 3 G: 3 G/100 ML INTRAVENOUS SOLUTION, PIGGYBACK (ML) INTRAVENOUS at 07:45:00

## 2024-10-07 RX ADMIN — PHENYLEPHRINE HCL 100 MCG: 10 MG/ML VIAL (ML) INJECTION at 08:31:00

## 2024-10-07 RX ADMIN — ROCURONIUM BROMIDE 20 MG: 10 INJECTION, SOLUTION INTRAVENOUS at 07:42:00

## 2024-10-07 RX ADMIN — DEXAMETHASONE SODIUM PHOSPHATE 5 MG: 10 INJECTION, SOLUTION INTRAMUSCULAR; INTRAVENOUS at 07:12:00

## 2024-10-07 RX ADMIN — PHENYLEPHRINE HCL 100 MCG: 10 MG/ML VIAL (ML) INJECTION at 07:45:00

## 2024-10-07 RX ADMIN — SODIUM CHLORIDE, SODIUM LACTATE, POTASSIUM CHLORIDE, CALCIUM CHLORIDE: 600; 310; 30; 20 INJECTION, SOLUTION INTRAVENOUS at 08:40:00

## 2024-10-07 RX ADMIN — LIDOCAINE HYDROCHLORIDE 5 ML: 10 INJECTION, SOLUTION EPIDURAL; INFILTRATION; INTRACAUDAL; PERINEURAL at 07:36:00

## 2024-10-07 RX ADMIN — TRANEXAMIC ACID 1000 MG: 10 INJECTION, SOLUTION INTRAVENOUS at 09:08:00

## 2024-10-07 RX ADMIN — PHENYLEPHRINE HCL 100 MCG: 10 MG/ML VIAL (ML) INJECTION at 08:56:00

## 2024-10-07 RX ADMIN — SODIUM CHLORIDE, SODIUM LACTATE, POTASSIUM CHLORIDE, CALCIUM CHLORIDE: 600; 310; 30; 20 INJECTION, SOLUTION INTRAVENOUS at 07:27:00

## 2024-10-07 RX ADMIN — KETOROLAC TROMETHAMINE 15 MG: 15 INJECTION, SOLUTION INTRAMUSCULAR; INTRAVENOUS at 09:18:00

## 2024-10-07 NOTE — PLAN OF CARE
Patient is alert and oriented times 4. Arrived from PACU and admitted to room 415. Family at bedside. Patient educated on using call light. Call light with in reach to the patient.   Problem: PAIN - ADULT  Goal: Verbalizes/displays adequate comfort level or patient's stated pain goal  Description: INTERVENTIONS:  - Encourage pt to monitor pain and request assistance  - Assess pain using appropriate pain scale  - Administer analgesics based on type and severity of pain and evaluate response  - Implement non-pharmacological measures as appropriate and evaluate response  - Consider cultural and social influences on pain and pain management  - Manage/alleviate anxiety  - Utilize distraction and/or relaxation techniques  - Monitor for opioid side effects  - Notify MD/LIP if interventions unsuccessful or patient reports new pain  - Anticipate increased pain with activity and pre-medicate as appropriate  Outcome: Progressing     Problem: SAFETY ADULT - FALL  Goal: Free from fall injury  Description: INTERVENTIONS:  - Assess pt frequently for physical needs  - Identify cognitive and physical deficits and behaviors that affect risk of falls.  - [place] fall precautions as indicated by assessment.  - Educate pt/family on patient safety including physical limitations  - Instruct pt to call for assistance with activity based on assessment  - Modify environment to reduce risk of injury  - Provide assistive devices as appropriate  - Consider OT/PT consult to assist with strengthening/mobility  - Encourage toileting schedule  Outcome: Progressing

## 2024-10-07 NOTE — PHYSICAL THERAPY NOTE
PHYSICAL THERAPY KNEE EVALUATION - INPATIENT      Room Number: 415/415-A  Evaluation Date: 10/7/2024  Type of Evaluation: Initial  Physician Order: PT Eval and Treat    Presenting Problem: primary osteoarthritis of left knee, s/p L TKA     Reason for Therapy: Mobility Dysfunction and Discharge Planning    PHYSICAL THERAPY ASSESSMENT   Patient is a 59 year old male admitted 10/7/2024 for primary osteoarthritis of left knee, s/p L TKA. Prior to admission, patient's baseline is Independent with ADLs/iADLs/functional mobility. Patient is currently functioning below baseline with bed mobility, transfers, gait, stair negotiation, standing prolonged periods, and performing household tasks.  Patient is requiring stand-by assist and contact guard assist as a result of the following impairments: decreased functional strength, decreased endurance/aerobic capacity, pain, impaired standing balance, decreased muscular endurance, and limited L knee ROM.  Physical Therapy will continue to follow for duration of hospitalization.    Patient will benefit from continued skilled PT Services at discharge to promote prior level of function and safety with additional support and return home with home health PT.    PLAN DURING HOSPITALIZATION  Nursing Mobility Recommendation : 1 Assist  PT Device Recommendation: Rolling walker  PT Treatment Plan: Bed mobility;Body mechanics;Endurance;Energy conservation;Patient education;Gait training;Range of motion;Strengthening;Stair training;Transfer training;Balance training  Rehab Potential : Good  Frequency (Obs): BID     PHYSICAL THERAPY MEDICAL/SOCIAL HISTORY     Problem List  Principal Problem:    Primary osteoarthritis of left knee  Active Problems:    Essential hypertension    LENARD (obstructive sleep apnea)    Mixed hyperlipidemia    Atrial fibrillation with rapid ventricular response (HCC)    Non-ischemic cardiomyopathy (HCC)    Morbid obesity (HCC)      HOME SITUATION  Type of Home: House  Home  Layout: One level  Stairs to Enter : 2   Railing: Yes    Lives With: Spouse (mother-in-law)    Drives: Yes   Patient Regularly Uses: None      SUBJECTIVE  Agreeable     PHYSICAL THERAPY EXAMINATION   OBJECTIVE  Precautions: Limb alert - left  Fall Risk: Standard fall risk    WEIGHT BEARING RESTRICTION  L Lower Extremity: Weight Bearing as Tolerated    PAIN ASSESSMENT  Rating: 3  Location: left knee  Management Techniques: Activity promotion;Body mechanics;Repositioning    COGNITION  Overall Cognitive Status:  WFL - within functional limits    RANGE OF MOTION AND STRENGTH ASSESSMENT  Upper extremity ROM and strength are within functional limits   Lower extremity ROM is within functional limits; L knee limited, approx 0-90 degrees   Lower extremity strength is within functional limits; LLE not formally assessed due to pain and recent procedure, approx 3/5     BALANCE  Static Sitting: Good  Dynamic Sitting: Fair +  Static Standing: Fair  Dynamic Standing: Fair -                                                                       ACTIVITY TOLERANCE  Pulse: 87  Heart Rate Source: Monitor     BP: (!) 178/99 (RN aware)  BP Location: Right arm  BP Method: Automatic  Patient Position: Sitting    O2 WALK  Oxygen Therapy  SPO2% on Room Air at Rest: 96    AM-PAC '6-Clicks' INPATIENT SHORT FORM - BASIC MOBILITY  How much difficulty does the patient currently have...  Patient Difficulty: Turning over in bed (including adjusting bedclothes, sheets and blankets)?: A Little   Patient Difficulty: Sitting down on and standing up from a chair with arms (e.g., wheelchair, bedside commode, etc.): A Little   Patient Difficulty: Moving from lying on back to sitting on the side of the bed?: A Little   How much help from another person does the patient currently need...   Help from Another: Moving to and from a bed to a chair (including a wheelchair)?: A Little   Help from Another: Need to walk in hospital room?: A Little   Help from  Another: Climbing 3-5 steps with a railing?: A Little     AM-PAC Score:  Raw Score: 18   Approx Degree of Impairment: 46.58%   Standardized Score (AM-PAC Scale): 43.63   CMS Modifier (G-Code): CK     FUNCTIONAL ABILITY STATUS  Functional Mobility/Gait Assessment  Gait Assistance: Contact guard assist  Distance (ft): 25 ft x 2  Assistive Device: Rolling walker (bariatric)  Pattern: L Decreased stance time;Shuffle (decreased carlos speed, decreased step length, antalgic. Cues to keep RW on ground at all times.)  Supine to Sit: stand-by assist  Sit to Stand: contact guard assist progressing to stand-by assist with increased trials from EOB and bedside chair. Patient tolerated alternated marching in place with BUE support and CGA prior to mobilization; no knee buckling or LOB noted.    Exercise/Education Provided:  Bed mobility  Body mechanics  Energy conservation  Functional activity tolerated  Gait training  Posture  ROM  Strengthening  Lower therapeutic exercise:  Alternating marching  Ankle pumps  LAQ  Quad sets  Transfer training    Skilled Therapy Provided: Patient is LLE WBAT. RN approved activity. Patient received supine in bed; agreeable to participate in therapy on this date. Patient educated on POC. Education on physiological benefits of mobility post operatively. Discussed TKA protocol, weight bearing status, precautions and education on therapeutic exercises - patient provided with handout to summarize. Patient reporting left knee pain, rated 3 out of 10 per the pain scale. Assisted patient to/from bathroom, declining further ambulation trial at this time.    The patient's Approx Degree of Impairment: 46.58% has been calculated based on documentation in the [facility] '6 clicks' Inpatient Basic Mobility Short Form.  Research supports that patients with this level of impairment may benefit from HHPT.  Final disposition will be made by interdisciplinary medical team.    Patient End of Session: Up in chair;Needs  met;Call light within reach;RN aware of session/findings;All patient questions and concerns addressed;Hospital anti-slip socks    CURRENT GOALS  Goals to be met by: 10/14/24  Patient Goal Patient's self-stated goal is: go home   Goal #1 Patient is able to demonstrate supine - sit EOB @ level: supervision   Goal #1   Current Status    Goal #2 Patient is able to demonstrate transfers Sit to/from Stand at assistance level: supervision     Goal #2  Current Status    Goal #3 Patient is able to ambulate 200 feet with assistive device at assistance level: supervision   Goal #3   Current Status    Goal #4 Patient will negotiate 2 stairs/one curb w/ assistive device and supervision   Goal #4   Current Status    Goal #5  AROM 0 degrees extension to 95 degrees flexion L knee   Goal #5   Current Status    Goal #6 Patient independently performs home exercise program for ROM/strengthening per the instructions provided in preparation for discharge.   Goal #6  Current Status      Patient Evaluation Complexity Level:  History Low - no personal factors and/or co-morbidities   Examination of body systems Low -  addressing 1-2 elements   Clinical Presentation Low- Stable   Clinical Decision Making  Low Complexity     Gait Training: 10 minutes  Therapeutic Activity:  14 minutes

## 2024-10-07 NOTE — CONSULTS
[facility]    PATIENT'S NAME: ZOILA MODI   ATTENDING PHYSICIAN: Collin Cardona MD   CONSULTING PHYSICIAN: Sami Woodall MD   PATIENT ACCOUNT#:   [id number]    LOCATION:  [address] [place] [address]  MEDICAL RECORD #:   [id number]       YOB: 1965  ADMISSION DATE:       10/07/2024      CONSULT DATE:  10/07/2024    REPORT OF CONSULTATION      REASON FOR ADMISSION:  Post left total knee arthroplasty.    HISTORY OF PRESENT ILLNESS:  Patient is a 59-year-old  male with chronic left knee pain and underlying severe primary osteoarthritis, failed outpatient conservative medical management options.  Scheduled today for above-mentioned procedure by his orthopedic surgeon, Dr. Collin Cardona.  Preoperatively, he had saphenous nerve block.  Postoperatively, transferred to PACU for further monitoring.      PAST MEDICAL HISTORY:  Paroxysmal atrial fibrillation, anticoagulated with Eliquis; morbid obesity; obstructive sleep apnea; hypertension; hyperlipidemia; osteoarthritis; nonischemic cardiomyopathy, ejection fraction 35%.    PAST SURGICAL HISTORY:  Left wrist open reduction and internal fixation.    MEDICATIONS:  Please see medication reconciliation list.     ALLERGIES:  Side effects to lisinopril, cough.  No known drug allergies.    SOCIAL HISTORY:  No tobacco or drug use.  Social alcohol.  Lives with his family.  Independent in his basic activities of daily living.     FAMILY HISTORY:  Father had heart disease and dementia.    REVIEW OF SYSTEMS:  Currently resting in bed.  No left knee pain.  No chest pain.  No shortness of breath.  Other 12-point review of systems is negative.       PHYSICAL EXAMINATION:    GENERAL:  Alert and oriented to time, place and person.  No acute distress.   VITAL SIGNS:  Temperature 98.5, pulse 79, respiratory rate 19, blood pressure 164/96, pulse ox 97% on room air.  HEENT:  Atraumatic.  Oropharynx clear.  Moist mucous membranes.  Ears and nose normal.   Eyes:  Anicteric sclerae.   NECK:  Supple.  No lymphadenopathy.  Trachea midline.  Full range of motion.   LUNGS:  Clear to auscultation bilaterally.  Normal respiratory effort.    HEART:  Regular rate and rhythm.  S1 and S2 auscultated.  No murmur.    ABDOMEN:  Soft, nondistended.  No tenderness.  Positive bowel sounds.   EXTREMITIES:  Left knee dressing.  No leg edema.  No clubbing or cyanosis.   NEUROLOGIC:  Decreased sensation and muscle movement in left lower extremity, post saphenous nerve block.  No other focal findings.    ASSESSMENT AND PLAN:    1.   Left knee primary osteoarthritis, status post left total knee arthroplasty.  Saphenous nerve block.  Pain control.  Neurovascular checks.  Eliquis for DVT prophylaxis.  Physical and occupational therapy.    2.   Obstructive sleep apnea.  Apply obstructive sleep apnea protocol and monitor.  3.   Nonischemic cardiomyopathy.  Continue to monitor postoperatively.  No clear evidence of fluid overload status.  4.   Morbid obesity.  Monitor respiratory and hemodynamic status.  5.   Essential hypertension.  Continue home medications and monitor.  6.   Hyperlipidemia.  Continue home medications.  7.   History of atrial fibrillation.  Continue to monitor.  Continue home medications.    Dictated By Sami Woodall MD  d: 10/07/2024 14:28:56  t: 10/07/2024 15:07:01  Job [id number]  FB/

## 2024-10-07 NOTE — CM/SW NOTE
CM received email from [facility] liaison, Pt has been referred to [facility] prior to surgery by Dr. Cardona .     DSC to send ref upon admission. Orders/ F2F needed.   [employer] [facility] [employer], [address], [address]   [place] [address]   Phone: [phone]   Fax: [phone]      Reina Blackmon RN, BSN  Nurse   [phone]

## 2024-10-07 NOTE — CM/SW NOTE
Department  notified of request for HHC, aidin referrals started. Assigned CM/SW to follow up with pt/family on further discharge planning.     Manuela Godoy, DSC

## 2024-10-07 NOTE — ANESTHESIA PREPROCEDURE EVALUATION
Anesthesia PreOp Note    HPI:     Mitesh Flood is a 59 year old male with a PMHX of HTN, CHF EF 35-40% ( ECHO 5/2/24), A fib, HLD,LENARD on CPAP who presents for Left total knee arthroplasty (Left: Knee)     Date of Surgery: 10/7/2024    Procedure(s):  Left total knee arthroplasty  Indication: Arthritis of knee    Relevant Problems   No relevant active problems       NPO:  Last Liquid Consumption Date: 10/06/24  Last Liquid Consumption Time: 1900  Last Solid Consumption Date: 10/06/24  Last Solid Consumption Time: 1200  Last Liquid Consumption Date: 10/06/24          History Review:  Patient Active Problem List    Diagnosis Date Noted    SOB (shortness of breath) 05/08/2024    Hyponatremia 05/08/2024    Atrial fibrillation with rapid ventricular response (HCC) 04/28/2024    Acute congestive heart failure, unspecified heart failure type (HCC) 04/28/2024    Mixed hyperlipidemia 12/10/2018    Vitamin D deficiency 08/06/2018    LENARD (obstructive sleep apnea) 08/03/2018    Essential hypertension 07/25/2016       Past Medical History:    Arrhythmia    afib    Essential hypertension    High blood pressure    Osteoarthritis    Sleep apnea    cpap       Past Surgical History:   Procedure Laterality Date    Wrist fracture surgery Left        Medications Prior to Admission   Medication Sig Dispense Refill Last Dose    Multiple Vitamins-Minerals (MULTI-VITAMIN/MINERALS) Oral Tab Take 1 tablet by mouth daily.   10/6/2024 at 0900    metoprolol succinate  MG Oral Tablet 24 Hr Take 1 tablet (100 mg total) by mouth 2x Daily(Beta Blocker). 60 tablet 6 10/7/2024 at 0430    spironolactone 25 MG Oral Tab Take 1 tablet (25 mg total) by mouth daily. 30 tablet 6 10/6/2024 at 0900    torsemide 20 MG Oral Tab Take 1 tablet (20 mg total) by mouth daily. 30 tablet 6 10/6/2024 at 0900    apixaban 5 MG Oral Tab Take 1 tablet (5 mg total) by mouth 2 (two) times daily. 60 tablet 6 10/1/2024    sacubitril-valsartan (ENTRESTO) 24-26 MG  Oral Tab Take 1 tablet by mouth 2 (two) times daily. 60 tablet 6 0900     Current Facility-Administered Medications Ordered in Epic   Medication Dose Route Frequency Provider Last Rate Last Admin    lactated ringers infusion   Intravenous Continuous Collin Cardona MD 20 mL/hr at 10/07/24 0631 New Bag at 10/07/24 0631    metoprolol tartrate (Lopressor) tab 25 mg  25 mg Oral Once PRN Collin Cardona MD        ceFAZolin (Ancef) 3 g in sodium chloride 0.9% 100mL IVPB premix  3 g Intravenous Once Collin Cardona MD        clonidine-EPINEPHrine-ropivacaine-ketorolac (CERTS) (Duraclon-Adrenalin-Naropin-Toradol) pain cocktail irrigation   Intra-articular Once (Intra-Op) Collin Cardona MD         No current [facility]-ordered outpatient medications on file.       Allergies   Allergen Reactions    Lisinopril Coughing       Family History   Problem Relation Age of Onset    Heart Disorder Father     Dementia Father     Skin cancer Sister     Hypertension Sister     Hypertension Brother     Other (Arn Cancer) Brother     No Known Problems Brother     Blindness Brother     Hypertension Brother      Social History     Socioeconomic History    Marital status:    Tobacco Use    Smoking status: Never    Smokeless tobacco: Never   Vaping Use    Vaping status: Never Used   Substance and Sexual Activity    Alcohol use: Yes     Alcohol/week: 15.0 standard drinks of alcohol     Types: 15 Standard drinks or equivalent per week     Comment: 3 times a week    Drug use: No   Other Topics Concern    Caffeine Concern No    Exercise Yes    Seat Belt Yes    Special Diet No    Stress Concern Yes    Weight Concern No       Available pre-op labs reviewed.  Lab Results   Component Value Date    WBC 5.2 09/04/2024    RBC 4.58 09/04/2024    HGB 16.1 09/04/2024    HCT 44.9 09/04/2024    MCV 98.0 09/04/2024    MCH 35.2 (H) 09/04/2024    MCHC 35.9 09/04/2024    RDW 12.7 09/04/2024    .0 (L) 09/04/2024     Lab  Results   Component Value Date     09/04/2024    K 4.2 09/04/2024     09/04/2024    CO2 30.0 09/04/2024    BUN 13 09/04/2024    CREATSERUM 0.99 09/04/2024    GLU 85 09/04/2024    CA 9.2 09/04/2024          Vital Signs:  Body mass index is 39.5 kg/m².   height is 1.905 m (6' 3\") and weight is 143.3 kg (316 lb) (abnormal). His oral temperature is 97.4 °F (36.3 °C). His blood pressure is 174/94 (abnormal) and his pulse is 87. His respiration is 18 and oxygen saturation is 94%.   Vitals:    10/02/24 1013 10/07/24 0554   BP:  (!) 174/94   Pulse:  87   Resp:  18   Temp:  97.4 °F (36.3 °C)   TempSrc:  Oral   SpO2:  94%   Weight: (!) 140.6 kg (310 lb) (!) 143.3 kg (316 lb)   Height: 1.905 m (6' 3\") 1.905 m (6' 3\")        Anesthesia Evaluation     Patient summary reviewed and Nursing notes reviewed    No history of anesthetic complications   Airway   Mallampati: II  TM distance: >3 FB  Neck ROM: full  Dental - Dentition appears grossly intact     Pulmonary - normal exam   (+) shortness of breath, sleep apnea on CPAP  Cardiovascular - normal exam  (+) hypertension, CHF    ROS comment: AKILAH 5/2/2024    FINDINGS:   · Moderately reduced LV systolic function with estimated LVEF 35% with global hypokinesis and dyssynergy of the septal wall due to LBBB. Grossly RV normal size and systolic function.  Left atrial enlargement  · Normal mitral, tricuspid and pulmonic valves were seen.   · There was a trileaflet aortic valve without stenosis and trivial insufficiency.   · Velocities of average 60 cm/s were seen in the TATA. There was no evidence for intracardiac mass or thrombus in the TATA.  · There was no evidence for reversal of flow in the pulmonary veins.   · A color Doppler flow interrogation of the intra-atrial septum was performed and there was no intracardiac shunting to suggest an ASD or PFO.  · There was minimal atherosclerotic plaque in the visualized aorta without evidence for mobile atheromata or thrombus.  · No  pericardial effusion.       Sinus rhythm with 1st degree A-V block   Left atrial enlargement   Left bundle branch block   Abnormal ECG   When compared with ECG of 28-APR-2024 14:35,   Sinus rhythm has replaced Atrial fibrillation   Vent. rate has decreased BY  58 BPM   Confirmed by YFN HOLCOMB JORDAN ([id number]) on 5/3/2024 6:43:51 PM         Neuro/Psych      GI/Hepatic/Renal - negative ROS     Endo/Other - negative ROS   Abdominal   (+) obese                 Anesthesia Plan:   ASA:  4  Plan:   General and regional  Post-op Pain Management: Saphenous block  Plan Comments: Large neck circumference, small mouth opening.   Recommend Sinton scope for airway management   Informed Consent Plan and Risks Discussed With:  Patient  Use of Blood Products Discussed With:  Patient  Discussed plan with:  CRNA and attending      I have informed Mitesh PAWAN Remigio and/or legal guardian or family member of the nature of the anesthetic plan, benefits, risks including possible dental damage if relevant, major complications, and any alternative forms of anesthetic management.   All of the patient's questions were answered to the best of my ability. The patient desires the anesthetic management as planned.  Juni Dupont CRNA  10/7/2024 7:06 AM  Present on Admission:  **None**

## 2024-10-07 NOTE — ANESTHESIA POSTPROCEDURE EVALUATION
Patient: Mitesh Flood    Procedure Summary       Date: 10/07/24 Room / Location: [facility] MAIN OR  / [facility] MAIN OR    Anesthesia Start: 0727 Anesthesia Stop:     Procedure: Left total knee arthroplasty (Left: Knee) Diagnosis: (Arthritis of knee)    Surgeons: Collin Cardona MD Anesthesiologist: Hank Alexandre MD    Anesthesia Type: general, regional ASA Status: 4            Anesthesia Type: general, regional    Vitals Value Taken Time   /76 10/07/24 1026   Temp 97.8 °F (36.6 °C) 10/07/24 0948   Pulse 98 10/07/24 1028   Resp 9 10/07/24 1028   SpO2 94 % 10/07/24 1028   Vitals shown include unfiled device data.    [facility] AN Post Evaluation:   Patient Evaluated in PACU  Patient Participation: complete - patient participated  Level of Consciousness: awake  Pain Management: adequate  Airway Patency:patent  Dental exam unchanged from preop  Yes    Cardiovascular Status: acceptable  Respiratory Status: acceptable  Postoperative Hydration acceptable      Hank Alexandre MD  10/7/2024 10:28 AM

## 2024-10-07 NOTE — OPERATIVE REPORT
OPERATIVE REPORT    DATE OF SURGERY: 10/7/2024    PREOPERATIVE DIAGNOSIS:   Left knee osteoarthritis    POST-OPERATIVE DIAGNOSIS:   Left knee osteoarthritis    PROCEDURE:  Left primary total knee arthroplasty with patellar resurfacing    Location: [facility]    SURGEON: Collin Cardona MD    Anesthesia type: Adductor Canal + General  Estimated Blood Loss: 50 cc    Drains: None    Complications: None immediately apparent    Findings: End stage osteoarthritis with full thickness cartilage loss    Specimen: Resected bone and cartilage    Implants:  Gamaliel 12 TM Femur  Gamaliel H TM Tibia  Gamaliel 35 TM Patella   Size 10 Polyethylene MC    Indication:   This is a 59 year old male, who presented with chronic knee pain refractory to non-operative treatment (including pain relieving medication, corticosteroid injections and physical therapy), and impairing activities of daily living. Radiographic evaluation demonstrated knee osteoarthritis with varus deformity. Surgical versus non-surgical options were discussed with the patient, and together we decided it is best to proceed with a total knee arthroplasty with the goals of pain relief and improvement in function. All risks and benefits of surgery including bleeding, infection, instability, sangeeta-prosthetic fracture, extensor mechanism injury, neurovascular injury, as well as medical and anesthesia-related complications were discussed with the patient / family, who elected to proceed with surgery.     Procedure in detail:    Following correct identification of the patient and the correct extremity, the patient was taken to the operating room and positioned supine on a regular table. Sangeeta-operative antibiotics and the above anesthesia were administered prior to incision per the standard protocol. A non-sterile padded tourniquet was placed on the thigh during the procedure and the extremity was prepped and draped in the usual sterile fashion. The extremity was  exsanguinated using Esmarch bandage and the tourniquet was raised to 300mmHg.    A midline incision was made medial to the tibial tubercle centered over patella taken down to the joint capsule of the knee. Minimal subfascial flaps were created. A medial parapatellar arthrotomy was performed. The deep MCL was subperiosteally elevated at the medial proximal tibial joint line for exposure. The fat pad was released off of the tibial plateau laterally. The patella was everted and the knee was flexed. The remnants of the anterior horn of the medial and lateral meniscus were removed as well as the ACL and PCL from the intercondylar notch. All distal protruding osteophytes were removed.     The PSI guide was used to position the distal femoral cutting jig. The distal femur was resected in the appropriate amount valgus according to the preoperative template. The resection depth and level was checked to be appropriate.    Next a size 12 4-in-1 distal cutting block was positioned also based on the PSI guide. We then performed the anterior/posterior and chamfer cuts for the femoral component.     The tibia was subluxed anteriorly and exposed with retractors. The lateral meniscus was excised. The tibial pins were placed using the PSI guide and the proximal tibial surface was resected in an alignment perpendicular to the mechanical axis, and to a depth accommodating the smallest insert/tibial baseplate thickness.     The knee was then brought into full extension and our extension gap was assessed. Appropriate releases were performed in extension on the side of the concavity of the deformity to form a rectangular flexion space and spacer block was placed securely into a rectangular extension space giving good collateral stability and recreation of our mechanical axis.     The knee was then flexed to 90 degrees.  A laminar  was then placed laterally first then medially, allowing exposure and removal of the remnants of the  posterior horns of the medial and lateral menisci as well as any posterior osteophyte. Pain cocktail injection was delivered into the posterior capsule.     The tibia was then subluxed anteriorly and sized. The tibial template guide was pinned, reamed and the lug holes were drilled in the appropriate amount of external rotation. A trial tibial baseplate was inserted. The femur was then exposed again and a trial femur was placed, the knee was reduced with a trial insert and was taken through a range of motion and found to be stable in the varus/valgus plane 0-30 degrees of flexion and the AP plane at 90 degrees of flexion.     Our attention was then turned to the patella. A symmetric resection was performed using an oscillating saw to recreate native patella height. All extraneous osteophyte and synovium was removed. A trial button was placed and the patella tracked centrally using the no thumbs technique. All trial components were removed. The patella was sized to a 35 and the peg holes were drilled    The final components were opened on the back table and sequentially press fit (tibia and femur) and cemented (patella). The femur and the tibia were copiously lavaged.  A periarticular pain cocktail injection was delivered to the soft tissues.  The knee was reduced with a trial insert in place. The final insert was then placed onto the tibial tray, impacted and locked. Stability testing was consistent with intraoperative trialing and the patella tracked centrally.     The knee was then copiously lavaged with dilute betadine and saline irrigation.  The deep capsule was closed with #1 Quill. The subcutaneous tissue was closed with 2-0 Vicryl absorbable suture and the skin was closed with 3-0 monocryl. A sterile occlusive dressing was applied.     The patient tolerated the procedure well and taken to the recovery unit without immediate complications.

## 2024-10-07 NOTE — H&P
[facility]  part of [facility]    History & Physical    Mitesh Flood Patient Status:  Hospital Outpatient Surgery    1965 MRN [id number]   Location [facility] POST ANESTHESIA CARE UNIT Attending Collin Cardona, *   Hosp Day # 0 PCP Shara Vance MD     Date:  10/7/2024  Date of Admission:  10/7/2024    History provided by:patient  Chief Complaint:   Left knee osteoarthritis    HPI:   Mitesh Flood is a(n) 59 year old male. Left knee osteoarthritis    History     Past Medical History:    Arrhythmia    afib    Essential hypertension    High blood pressure    Osteoarthritis    Sleep apnea    cpap     Past Surgical History:   Procedure Laterality Date    Wrist fracture surgery Left      Family History   Problem Relation Age of Onset    Heart Disorder Father     Dementia Father     Skin cancer Sister     Hypertension Sister     Hypertension Brother     Other (Arn Cancer) Brother     No Known Problems Brother     Blindness Brother     Hypertension Brother      Social History:  Social History     Socioeconomic History    Marital status:    Tobacco Use    Smoking status: Never    Smokeless tobacco: Never   Vaping Use    Vaping status: Never Used   Substance and Sexual Activity    Alcohol use: Yes     Alcohol/week: 15.0 standard drinks of alcohol     Types: 15 Standard drinks or equivalent per week     Comment: 3 times a week    Drug use: No   Other Topics Concern    Caffeine Concern No    Exercise Yes    Seat Belt Yes    Special Diet No    Stress Concern Yes    Weight Concern No     Social Determinants of Health     Financial Resource Strain: Low Risk  (5/3/2024)    Financial Resource Strain     Difficulty of Paying Living Expenses: Not hard at all     Med Affordability: No   Food Insecurity: No Food Insecurity (2024)    Food Insecurity     Food Insecurity: Never true   Transportation Needs: No Transportation Needs (2024)    Transportation Needs      Lack of Transportation: No   Housing Stability: Low Risk  (4/28/2024)    Housing Stability     Housing Instability: No       Allergies/Medications:   Allergies:   Allergies   Allergen Reactions    Lisinopril Coughing     Medications Prior to Admission   Medication Sig    Multiple Vitamins-Minerals (MULTI-VITAMIN/MINERALS) Oral Tab Take 1 tablet by mouth daily.    metoprolol succinate  MG Oral Tablet 24 Hr Take 1 tablet (100 mg total) by mouth 2x Daily(Beta Blocker).    spironolactone 25 MG Oral Tab Take 1 tablet (25 mg total) by mouth daily.    torsemide 20 MG Oral Tab Take 1 tablet (20 mg total) by mouth daily.    apixaban 5 MG Oral Tab Take 1 tablet (5 mg total) by mouth 2 (two) times daily.    sacubitril-valsartan (ENTRESTO) 24-26 MG Oral Tab Take 1 tablet by mouth 2 (two) times daily.       Review of Systems:   Pertinent items are noted in HPI.    Physical Exam:   Vital Signs:  Blood pressure (!) 174/94, pulse 87, temperature 97.4 °F (36.3 °C), temperature source Oral, resp. rate 18, height 6' 3\" (1.905 m), weight (!) 316 lb (143.3 kg), SpO2 94%.     General: No acute distress. Alert and oriented x 3.  HEENT: Moist mucous membranes. EOM-I. PERRL  Neck: No lymphadenopathy.  No JVD. No carotid bruits.  Respiratory: Clear to auscultation bilaterally.  No wheezes. No rhonchi.  Cardiovascular: S1, S2.  Regular rate and rhythm.  No murmurs. Equal pulses   Abdomen: Soft, nontender, nondistended.  Positive bowel sounds. No rebound tenderness  Neurologic: No focal neurological deficits.  Musculoskeletal: Left knee osteoarthritis  Integument: No lesions. No erythema.  Psychiatric: Appropriate mood and affect.      Results:     Lab Results   Component Value Date    WBC 5.2 09/04/2024    HGB 16.1 09/04/2024    HCT 44.9 09/04/2024    .0 (L) 09/04/2024    CREATSERUM 0.99 09/04/2024    BUN 13 09/04/2024     09/04/2024    K 4.2 09/04/2024     09/04/2024    CO2 30.0 09/04/2024    GLU 85 09/04/2024    CA  9.2 09/04/2024    ALB 4.7 09/04/2024    ALKPHO 98 09/04/2024    BILT 1.3 (H) 09/04/2024    TP 8.1 09/04/2024    AST 44 (H) 09/04/2024    ALT 28 09/04/2024    PTT 29.6 05/01/2024    INR 1.13 04/28/2024    TSH 2.048 09/04/2024    MG 2.2 04/29/2024       No results found.        Assessment/Plan:   Left total knee arthroplasty          Collin Cardona MD  10/7/2024

## 2024-10-07 NOTE — RESPIRATORY THERAPY NOTE
LENARD ASSESSMENT:    Pt does have a previous diagnosis of LENARD. Pt does routinely use a CPAP device at home.     CPAP INITIATION:    Pt to be placed on CPAP: yes

## 2024-10-07 NOTE — ANESTHESIA PROCEDURE NOTES
Peripheral Block    Date/Time: 10/7/2024 7:05 AM    Performed by: Hank Alexandre MD  Authorized by: Hank Alexandre MD      General Information and Staff    Start Time:  10/7/2024 7:05 AM  End Time:  10/7/2024 7:13 AM  Anesthesiologist:  Hank Alexandre MD  Performed by:  Anesthesiologist  Patient Location:  PACU      Site Identification: real time ultrasound guided and image stored and retrievable      Reason for Block: at surgeon's request and post-op pain management    Preanesthetic Checklist: 2 patient identifers, IV checked, risks and benefits discussed, monitors and equipment checked, pre-op evaluation, timeout performed, anesthesia consent and sterile technique used      Procedure Details    Patient Position:  Supine  Prep: ChloraPrep    Monitoring:  Cardiac monitor  Block Type:  Saphenous  Injection Technique:  Single-shot    Needle    Needle Type:   Needle Length:  90 mm  Needle Localization:  Anatomical landmarks and ultrasound guidance  Reason for Ultrasound Use: appropriate spread of the medication was noted in real time and no ultrasound evidence of intravascular and/or intraneural injection            Assessment    Injection Assessment:  Good spread noted, incremental injection, low pressure, local visualized surrounding nerve on ultrasound, negative aspiration for heme and no pain on injection  Paresthesia Pain:  None  Heart Rate Change: No        Medications  10/7/2024 7:05 AM  midazolam (VERSED)injection 2mg/2ml - Intravenous   2 mg - 10/7/2024 7:05:00 AM  lidocaine injection 1% - Intradermal   5 mL - 10/7/2024 7:05:00 AM  ropivacaine (NAROPIN) injection 0.5% - Infiltration   30 mL - 10/7/2024 7:12:00 AM  dexamethasone (DECADRON) PF injection 10 mg/ml - Injection   5 mg - 10/7/2024 7:12:00 AM    Additional Comments

## 2024-10-08 VITALS
OXYGEN SATURATION: 96 % | TEMPERATURE: 98 F | RESPIRATION RATE: 18 BRPM | HEART RATE: 64 BPM | BODY MASS INDEX: 39.17 KG/M2 | HEIGHT: 75 IN | SYSTOLIC BLOOD PRESSURE: 137 MMHG | DIASTOLIC BLOOD PRESSURE: 97 MMHG | WEIGHT: 315 LBS

## 2024-10-08 LAB
HCT VFR BLD AUTO: 39.2 %
HGB BLD-MCNC: 14.2 G/DL

## 2024-10-08 PROCEDURE — 99214 OFFICE O/P EST MOD 30 MIN: CPT | Performed by: STUDENT IN AN ORGANIZED HEALTH CARE EDUCATION/TRAINING PROGRAM

## 2024-10-08 NOTE — PHYSICAL THERAPY NOTE
PHYSICAL THERAPY TREATMENT NOTE - INPATIENT     Room Number: 415/415-A       Presenting Problem: primary osteoarthritis of left knee, s/p L TKA  Co-Morbidities : afib, CHF, HTN, LENARD, morbid obesity    Problem List  Principal Problem:    Primary osteoarthritis of left knee  Active Problems:    Essential hypertension    LENARD (obstructive sleep apnea)    Mixed hyperlipidemia    Atrial fibrillation with rapid ventricular response (HCC)    Non-ischemic cardiomyopathy (HCC)    Morbid obesity (HCC)      PHYSICAL THERAPY ASSESSMENT   Patient demonstrates good  progress this session, goals  remain in progress.      Patient is requiring contact guard assist and minimal assist as a result of the following impairments: decreased functional strength, decreased endurance/aerobic capacity, and pain.     Patient continues to function below baseline with bed mobility, transfers, and gait.  Next session anticipate patient to progress bed mobility, transfers, and gait.  Physical Therapy will continue to follow patient for duration of hospitalization.    Patient continues to benefit from continued skilled PT services: at discharge to promote prior level of function.  Anticipate patient will return home with home health PT.    PLAN DURING HOSPITALIZATION  Nursing Mobility Recommendation : 1 Assist  PT Device Recommendation: Rolling walker  PT Treatment Plan: Bed mobility;Body mechanics;Endurance;Gait training  Frequency (Obs): BID     SUBJECTIVE  Pt reports being ready for PT RX    OBJECTIVE  Precautions: Limb alert - left    WEIGHT BEARING RESTRICTION  L Lower Extremity: Weight Bearing as Tolerated      PAIN ASSESSMENT   Ratin  Location: left knee  Management Techniques: Activity promotion;Body mechanics;Repositioning    BALANCE  Static Sitting: Good  Dynamic Sitting: Fair +  Static Standing: Fair  Dynamic Standing: Fair -    ACTIVITY TOLERANCE                          O2 WALK       AM-PAC '6-Clicks' INPATIENT SHORT FORM - BASIC  MOBILITY  How much difficulty does the patient currently have...  Patient Difficulty: Turning over in bed (including adjusting bedclothes, sheets and blankets)?: A Little   Patient Difficulty: Sitting down on and standing up from a chair with arms (e.g., wheelchair, bedside commode, etc.): A Little   Patient Difficulty: Moving from lying on back to sitting on the side of the bed?: A Little   How much help from another person does the patient currently need...   Help from Another: Moving to and from a bed to a chair (including a wheelchair)?: A Little   Help from Another: Need to walk in hospital room?: A Little   Help from Another: Climbing 3-5 steps with a railing?: A Little     AM-PAC Score:  Raw Score: 18   Approx Degree of Impairment: 46.58%   Standardized Score (AM-PAC Scale): 43.63   CMS Modifier (G-Code): CK    FUNCTIONAL ABILITY STATUS  Functional Mobility/Gait Assessment  Gait Assistance: Contact guard assist  Distance (ft): 2 x 100  Assistive Device: Rolling walker  Pattern: L Decreased stance time  Stairs: Stairs  How Many Stairs: 4  Device: 2 Rails  Assist: Contact guard assist  Pattern: Ascend and Descend  Rolling: minimal assist  Supine to Sit: minimal assist  Sit to Supine: minimal assist  Sit to Stand: minimal assist    Skilled Therapy Provided: Am session.Min a for bed mobility and transfer;extra time provided to complete task.EOB sitting balance activity with emphasis on core stabilization.Pt educated on deep breathing and relaxation technique.Pt amb 2   x100 ft with RW and CGa;provided cuing for gait pattern as well as for postural awareness.Navigated 4 stairs with CGA.There ex.Nustep x 7 min  to improve RPM as well as strengthening  ex.    The patient's Approx Degree of Impairment: 46.58% has been calculated based on documentation in the [facility] '6 clicks' Inpatient Daily Activity Short Form.  Research supports that patients with this level of impairment may benefit from Home with Home Health  PT.  Final disposition will be made by interdisciplinary medical team.    THERAPEUTIC EXERCISES  Lower Extremity Ankle pumps  Glut sets  Quad sets     Position Supine       Patient End of Session: Up in chair;Call light within reach;RN aware of session/findings;All patient questions and concerns addressed    CURRENT GOALS     Patient Goal Patient's self-stated goal is: go home   Goal #1 Patient is able to demonstrate supine - sit EOB @ level: supervision   Goal #1   Current Status Min a   Goal #2 Patient is able to demonstrate transfers Sit to/from Stand at assistance level: supervision     Goal #2  Current Status Min a   Goal #3 Patient is able to ambulate 200 feet with assistive device at assistance level: supervision   Goal #3   Current Status Pt amb  2x 100 ft with RW and CGA   Goal #4 Patient will negotiate 2 stairs/one curb w/ assistive device and supervision   Goal #4   Current Status Navigated 4 stairs with CGA   Goal #5  AROM 0 degrees extension to 95 degrees flexion L knee   Goal #5   Current Status In progress   Goal #6 Patient independently performs home exercise program for ROM/strengthening per the instructions provided in preparation for discharge.   Goal #6  Current Status In progress     Gait Training: 15 minutes  Therapeutic Activity: 15 minutes  Neuromuscular Re-education:  minutes  Therapeutic Exercise: 15 minutes  Canalith Repositioning:  minutes  Manual Therapy:  minutes  Can add/delete any of these

## 2024-10-08 NOTE — CM/SW NOTE
10/08/24 1200   CM/SW Referral Data   Referral Source Social Work (self-referral);Physician   Reason for Referral Discharge planning   Informant EMR;Patient   Medical Hx   Does patient have an established PCP? Yes   Patient Info   Patient's Current Mental Status at Time of Assessment Oriented;Alert   Patient Status Prior to Admission   Independent with ADLs and Mobility Yes   Services in place prior to admission Home Health Care   Home Health Provider Info [facility]   Discharge Needs   Anticipated D/C needs Home health care   Choice of Post-Acute Provider   Informed patient of right to choose their preferred provider Yes   List of appropriate post-acute services provided to patient/family with quality data Yes     Orders/F2F uploaded to [facility]   [address], [address]   [place] [id number]   Phone: [phone]   Fax: [phone]    PLAN: Home with [facility]    Raiza ADAM, MSW ext. [phone]

## 2024-10-08 NOTE — CM/SW NOTE
Department  asked to send updates to [facility] referral for HHC.    Assigned SW/CM to follow up with patient/family on discharge plan.     Manuela Godoy, DSC

## 2024-10-08 NOTE — PHYSICAL THERAPY NOTE
PHYSICAL THERAPY TREATMENT NOTE - INPATIENT     Room Number: 415/415-A       Presenting Problem: primary osteoarthritis of left knee, s/p L TKA       Problem List  Principal Problem:    Primary osteoarthritis of left knee  Active Problems:    Essential hypertension    LENARD (obstructive sleep apnea)    Mixed hyperlipidemia    Atrial fibrillation with rapid ventricular response (HCC)    Non-ischemic cardiomyopathy (HCC)    Morbid obesity (HCC)      PHYSICAL THERAPY ASSESSMENT   Patient demonstrates good  progress this session, goals  remain in progress.      Patient is requiring contact guard assist and minimal assist as a result of the following impairments: decreased functional strength, decreased endurance/aerobic capacity, and pain.     Patient continues to function below baseline with bed mobility, transfers, and gait.  Next session anticipate patient to progress bed mobility, transfers, and gait.  Physical Therapy will continue to follow patient for duration of hospitalization.    Patient continues to benefit from continued skilled PT services: at discharge to promote prior level of function.  Anticipate patient will return home with home health PT.    PLAN DURING HOSPITALIZATION  Nursing Mobility Recommendation : 1 Assist  PT Device Recommendation: Rolling walker  PT Treatment Plan: Bed mobility;Body mechanics;Endurance;Gait training  Frequency (Obs): BID     SUBJECTIVE  Pt reports being ready for PT RX    OBJECTIVE  Precautions: Limb alert - left    WEIGHT BEARING RESTRICTION  L Lower Extremity: Weight Bearing as Tolerated      PAIN ASSESSMENT   Ratin  Location: left knee  Management Techniques: Activity promotion;Body mechanics;Repositioning    BALANCE  Static Sitting: Good  Dynamic Sitting: Fair +  Static Standing: Fair  Dynamic Standing: Fair -    ACTIVITY TOLERANCE                          O2 WALK       AM-PAC '6-Clicks' INPATIENT SHORT FORM - BASIC MOBILITY  How much difficulty does the patient  currently have...  Patient Difficulty: Turning over in bed (including adjusting bedclothes, sheets and blankets)?: A Little   Patient Difficulty: Sitting down on and standing up from a chair with arms (e.g., wheelchair, bedside commode, etc.): A Little   Patient Difficulty: Moving from lying on back to sitting on the side of the bed?: A Little   How much help from another person does the patient currently need...   Help from Another: Moving to and from a bed to a chair (including a wheelchair)?: A Little   Help from Another: Need to walk in hospital room?: A Little   Help from Another: Climbing 3-5 steps with a railing?: A Little     AM-PAC Score:  Raw Score: 18   Approx Degree of Impairment: 46.58%   Standardized Score (AM-PAC Scale): 43.63   CMS Modifier (G-Code): CK    FUNCTIONAL ABILITY STATUS  Functional Mobility/Gait Assessment  Gait Assistance: Contact guard assist  Distance (ft): 100  Assistive Device: Rolling walker  Pattern: L Decreased stance time  Stairs: Stairs  How Many Stairs: 4  Device: 2 Rails  Assist: Contact guard assist  Pattern: Ascend and Descend  Rolling: minimal assist  Supine to Sit: minimal assist  Sit to Supine: minimal assist  Sit to Stand: minimal assist    Skilled Therapy Provided: Am session.Min a for bed mobility and transfer;extra time provided to complete task.EOB sitting balance activity with emphasis on core stabilization.Pt educated on deep breathing and relaxation technique.Pt amb 100 ft with RW and CGa;provided cuing for gait pattern as well as for postural awareness.Navigated 4 stairs with CGA.There ex.    The patient's Approx Degree of Impairment: 46.58% has been calculated based on documentation in the [facility] '6 clicks' Inpatient Daily Activity Short Form.  Research supports that patients with this level of impairment may benefit from Home with Home Health PT.  Final disposition will be made by interdisciplinary medical team.    THERAPEUTIC EXERCISES  Lower Extremity Ankle  pumps  Glut sets  Quad sets     Position Supine       Patient End of Session: Up in chair;Call light within reach;RN aware of session/findings;All patient questions and concerns addressed    CURRENT GOALS     Patient Goal Patient's self-stated goal is: go home   Goal #1 Patient is able to demonstrate supine - sit EOB @ level: supervision   Goal #1   Current Status Min a   Goal #2 Patient is able to demonstrate transfers Sit to/from Stand at assistance level: supervision     Goal #2  Current Status Min a   Goal #3 Patient is able to ambulate 200 feet with assistive device at assistance level: supervision   Goal #3   Current Status Pt amb 100 ft with RW and CGA   Goal #4 Patient will negotiate 2 stairs/one curb w/ assistive device and supervision   Goal #4   Current Status Navigated 4 stairs with CGA   Goal #5  AROM 0 degrees extension to 95 degrees flexion L knee   Goal #5   Current Status In progress   Goal #6 Patient independently performs home exercise program for ROM/strengthening per the instructions provided in preparation for discharge.   Goal #6  Current Status In progress     Gait Training: 15 minutes  Therapeutic Activity: 15 minutes  Neuromuscular Re-education:  minutes  Therapeutic Exercise: 15 minutes  Canalith Repositioning:  minutes  Manual Therapy:  minutes  Can add/delete any of these

## 2024-10-08 NOTE — DISCHARGE SUMMARY
[facility]  part of [facility]    Discharge Summary    Mitesh Flood Patient Status:  Outpatient in a Bed    1965 MRN [id number]   Location [facility] 4W/SW/SE Attending No att. providers found   Hosp Day # 0 PCP Shara Vance MD     Date of Admission: 10/7/2024   Date of Discharge: 10/8/2024  3:22 PM    Admitting Diagnosis: Arthritis of knee    Disposition: Home    Discharge Diagnosis: .Principal Problem:    Primary osteoarthritis of left knee  Active Problems:    Essential hypertension    LENARD (obstructive sleep apnea)    Mixed hyperlipidemia    Atrial fibrillation with rapid ventricular response (HCC)    Non-ischemic cardiomyopathy (HCC)    Morbid obesity (HCC)      Hospital Course:   Reason for Admission: Scheduled Left Total Knee arthroplasty       Discharge Physical Exam:   GENERAL:  Alert and oriented to time, place and person.  No acute distress.   HEENT:  Atraumatic.  Oropharynx clear.  Moist mucous membranes.  Ears and nose normal.  Eyes:  Anicteric sclerae.   NECK:  Supple.  No lymphadenopathy.  Trachea midline.  Full range of motion.   LUNGS:  Clear to auscultation bilaterally.  Normal respiratory effort.    HEART:  Regular rate and rhythm.  S1 and S2 auscultated.  No murmur.    ABDOMEN:  Soft, nondistended.  No tenderness.  Positive bowel sounds.   EXTREMITIES:  Left knee dressing.  No leg edema.  No clubbing or cyanosis.   NEUROLOGIC:  No other focal findings.      Hospital Course:   Patient is a 59-year-old  male with chronic left knee pain and underlying severe primary osteoarthritis, who failed outpatient conservative medical management options. He was admitted for scheduled orthopedic intervention. He underwent Left primary total knee arthroplasty with patellar resurfacing on 10/07 with Dr. Collin Cardona.  Tolerated procedure well with no complications. Postoperative recovery was unremarkable- worked with PT, pain managed, and tolerated diet. He  was discharged home with home health orders, and follow up appointments given.       Complications: None    Consultants         Provider   Role Specialty     Pebbles Patrick MD      Consulting Physician HOSPITALIST     Sami Woodall MD      Consulting Physician HOSPITALIST     Shara Vance MD      Consulting Physician Internal Medicine          Surgical Procedures       Case IDs Date Procedure Surgeon Location Status    [id number] 10/7/24 Left total knee arthroplasty Collin Cardona MD [facility] MAIN OR Comp              Discharge Plan:   Discharge Condition: Stable    Discharge Medication List as of 10/8/2024  3:06 PM        Home Meds - Unchanged    Details   Multiple Vitamins-Minerals (MULTI-VITAMIN/MINERALS) Oral Tab Take 1 tablet by mouth daily., Historical      metoprolol succinate  MG Oral Tablet 24 Hr Take 1 tablet (100 mg total) by mouth 2x Daily(Beta Blocker)., Normal, Disp-60 tablet, R-6      spironolactone 25 MG Oral Tab Take 1 tablet (25 mg total) by mouth daily., Normal, Disp-30 tablet, R-6      torsemide 20 MG Oral Tab Take 1 tablet (20 mg total) by mouth daily., Normal, Disp-30 tablet, R-6      apixaban 5 MG Oral Tab Take 1 tablet (5 mg total) by mouth 2 (two) times daily., Normal, Disp-60 tablet, R-6COST CHECK PRIOR TO FILLING PLEASE      sacubitril-valsartan (ENTRESTO) 24-26 MG Oral Tab Take 1 tablet by mouth 2 (two) times daily., Normal, Disp-60 tablet, R-6Cost check prior to filling please                 Discharge Diet: As tolerated    Discharge Activity: As tolerated Per orthopedics     Follow up:      Follow-up Information       Shara Vance MD. Go in 2 day(s).    Specialty: Internal Medicine  Why: Please see your PCP for Hospital follow up  Contact information:  [address] [facility]  [address] [place] [id number] [phone]                                   Other Discharge Instructions:          HOME INSTRUCTIONS  AMBSURG HOME CARE INSTRUCTIONS: POST-OP ANESTHESIA  The  medication that you received for sedation or general anesthesia can last up to 24 hours. Your judgment and reflexes may be altered, even if you feel like your normal self.      We Recommend:   Do not drive any motor vehicle or bicycle   Avoid mowing the lawn, playing sports, or working with power tools/applicances (power saws, electric knives or mixers)   That you have someone stay with you on your first night home   Do not drink alcohol or take sleeping pills or tranquilizers   Do not sign legal documents within 24 hours of your procedure   If you had a nerve block for your surgery, take extra care not to put any pressure on your arm or hand for 24 hours    It is normal:  For you to have a sore throat if you had a breathing tube during surgery (while you were asleep!). The sore throat should get better within 48 hours. You can gargle with warm salt water (1/2 tsp in 4 oz warm water) or use a throat lozenge for comfort  To feel muscle aches or soreness especially in the abdomen, chest or neck. The achy feeling should go away in the next 24 hours  To feel weak, sleepy or \"wiped out\". Your should start feeling better in the next 24 hours.   To experience mild discomforts such as sore lip or tongue, headache, cramps, gas pains or a bloated feeling in your abdomen.   To experience mild back pain or soreness for a day or two if you had spinal or epidural anesthesia.   If you had laparoscopic surgery, to feel shoulder pain or discomfort on the day of surgery.   For some patients to have nausea after surgery/anesthesia    If you feel nausea or experience vomiting:   Try to move around less.   Eat less than usual or drink only liquids until the next morning   Nausea should resolve in about 24 hours    If you have a problem when you are at home:    Call your surgeons office   Discharge Instructions: After Your Surgery  You’ve just had surgery. During surgery, you were given medicine called anesthesia to keep you relaxed and  free of pain. After surgery, you may have some pain or nausea. This is common. Here are some tips for feeling better and getting well after surgery.   Going home  Your healthcare provider will show you how to take care of yourself when you go home. They'll also answer your questions. Have an adult family member or friend drive you home. For the first 24 hours after your surgery:   Don't drive or use heavy equipment.  Don't make important decisions or sign legal papers.  Take medicines as directed.  Don't drink alcohol.  Have someone stay with you, if needed. They can watch for problems and help keep you safe.  Be sure to go to all follow-up visits with your healthcare provider. And rest after your surgery for as long as your provider tells you to.   Coping with pain  If you have pain after surgery, pain medicine will help you feel better. Take it as directed, before pain becomes severe. Also, ask your healthcare provider or pharmacist about other ways to control pain. This might be with heat, ice, or relaxation. And follow any other instructions your surgeon or nurse gives you.      Stay on schedule with your medicine.     Tips for taking pain medicine  To get the best relief possible, remember these points:   Pain medicines can upset your stomach. Taking them with a little food may help.  Most pain relievers taken by mouth need at least 20 to 30 minutes to start to work.  Don't wait till your pain becomes severe before you take your medicine. Try to time your medicine so that you can take it before starting an activity. This might be before you get dressed, go for a walk, or sit down for dinner.  Constipation is a common side effect of some pain medicines. Call your healthcare provider before taking any medicines such as laxatives or stool softeners to help ease constipation. Also ask if you should skip any foods. Drinking lots of fluids and eating foods such as fruits and vegetables that are high in fiber can also  help. Remember, don't take laxatives unless your surgeon has prescribed them.  Drinking alcohol and taking pain medicine can cause dizziness and slow your breathing. It can even be deadly. Don't drink alcohol while taking pain medicine.  Pain medicine can make you react more slowly to things. Don't drive or run machinery while taking pain medicine.  Your healthcare provider may tell you to take acetaminophen to help ease your pain. Ask them how much you're supposed to take each day. Acetaminophen or other pain relievers may interact with your prescription medicines or other over-the-counter (OTC) medicines. Some prescription medicines have acetaminophen and other ingredients in them. Using both prescription and OTC acetaminophen for pain can cause you to accidentally overdose. Read the labels on your OTC medicines with care. This will help you to clearly know the list of ingredients, how much to take, and any warnings. It may also help you not take too much acetaminophen. If you have questions or don't understand the information, ask your pharmacist or healthcare provider to explain it to you before you take the OTC medicine.   Managing nausea  Some people have an upset stomach (nausea) after surgery. This is often because of anesthesia, pain, or pain medicine, less movement of food in the stomach, or the stress of surgery. These tips will help you handle nausea and eat healthy foods as you get better. If you were on a special food plan before surgery, ask your healthcare provider if you should follow it while you get better. Check with your provider on how your eating should progress. It may depend on the surgery you had. These general tips may help:   Don't push yourself to eat. Your body will tell you when to eat and how much.  Start off with clear liquids and soup. They're easier to digest.  Next try semi-solid foods as you feel ready. These include mashed potatoes, applesauce, and gelatin.  Slowly move to solid  foods. Don’t eat fatty, rich, or spicy foods at first.  Don't force yourself to have 3 large meals a day. Instead eat smaller amounts more often.  Take pain medicines with a small amount of solid food, such as crackers or toast. This helps prevent nausea.  When to call your healthcare provider  Call your healthcare provider right away if you have any of these:   You still have too much pain, or the pain gets worse, after taking the medicine. The medicine may not be strong enough. Or there may be a complication from the surgery.  You feel too sleepy, dizzy, or groggy. The medicine may be too strong.  Side effects such as nausea or vomiting. Your healthcare provider may advise taking other medicines to .  Skin changes such as rash, itching, or hives. This may mean you have an allergic reaction. Your provider may advise taking other medicines.  The incision looks different (for instance, part of it opens up).  Bleeding or fluid leaking from the incision site, and weren't told to expect that.  Fever of 100.4°F (38°C) or higher, or as directed by your provider.  Call 911  Call 911 right away if you have:   Trouble breathing  Facial swelling    If you have obstructive sleep apnea   You were given anesthesia medicine during surgery to keep you comfortable and free of pain. After surgery, you may have more apnea spells because of this medicine and other medicines you were given. The spells may last longer than normal.    At home:  Keep using the continuous positive airway pressure (CPAP) device when you sleep. Unless your healthcare provider tells you not to, use it when you sleep, day or night. CPAP is a common device used to treat obstructive sleep apnea.  Talk with your provider before taking any pain medicine, muscle relaxants, or sedatives. Your provider will tell you about the possible dangers of taking these medicines.  Contact your provider if your sleeping changes a lot even when taking medicines as  directed.  [employer] last reviewed this educational content on 10/1/2021  © 5582-3209 [employer]. All rights reserved. This information is not intended as a substitute for professional medical care. Always follow your healthcare professional's instructions.      DISCHARGE INSTRUCTIONS:  PLACE ICE TO SURGICAL AREA 20-30 MINUTES ON/ 20-30 MINUTES OFF. THIS IS TO HELP WITH PAIN & SWELLING.    TAKE YOUR MEDICATIONS BELOW AS PRESCRIBED. THESE HAVE BEEN SENT TO YOUR PHARMACY.    IT IS OK TO BEAR WEIGHT AS TOLERATED ON THE OPERATIVE EXTREMITY.         MEDICATIONS:    POST OP MEDICATION REGIMEN              MULTI-MODAL   PAIN REGIMEN    *Take 1st dose upon arrival home.    DRUG   FOR   FREQUENCY & DURATION   QTY   NOTES     WEANING  TIPS       Tylenol 500mg     Mild pain   Take 2 tabs every 6 hours     126   Can purchase over-the-counter    Stop using medication if no longer having pain.      Tramadol 50mg     Moderate pain   Take 1-2 tabs every 6 hours only as needed   30 May prescribe a refill, but ideally, this should be tapered off after surgery Stop using this medication 2nd   As pain decreases over time, increase the interval between doses (6 hours to 8, then 12, then 24) to taper off the medication.      Celebrex 200 mg     Inflammation   Take 1 tab daily for 30 days     30   May refill if needed beyond 30 days   Recommend completing the 30 day supply.       BREAKTHROUGH PAIN         Oxycodone 5mg       Severe pain     Take 1-2 tabs every 4-6 hours only as needed for severe pain       40   Take at least 1 hr apart from Tramadol.    Ideally, no refill. The goal is to taper off this medication as soon as possible, as it can be addictive and have side effects.    Stop using this medication 1st if no longer having severe pain.     BLOOD THINNER  *1st dose after surgery at 6pm*   Aspirin 81mg   Blood clot prevention   Take 1 tab twice daily for 42 days     84     No refills   Complete the entire course of  your blood thinner.     STOOL SOFTENER     Colace 100 mg   Constipation   Take 1 tab twice daily  while on opioids     60 Refer to discharge instructions if constipation persists even after taking this medication   Stop taking if having diarrhea or loose stools.         DRESSING:    YOU MAY REMOVE ACE BANDAGE AND COTTON WRAP 2  DAYS AFTER SURGERY    YOU HAVE A SPECIAL DRESSING CALLED AN AQUACEL DRESSING OVER YOUR INCISION.    THE DRESSING STAYS FOR 12 DAYS FROM SURGERY.    YOU MAY SHOWER AS SOON AS YOU RETURN HOME WITH THE AQUACEL DRESSING INTACT OVER YOUR INCISION AREA.    IF THE DRESSING LEAKS OR COMES LOOSE BEFORE THE 7 DAY PERIOD CONTACT YOUR DOCTOR / SURGEON.    AFTER   12 DAYS THE DRESSING IS REMOVED BY PULLING ON THE EDGE OF THE DRESSING AND GENTLY STRETCHING IT, THEN PEEL IT OFF SLOWLY LIKE A BANDAID. IF YOU HAVE ANY QUESTIONS OR CONCERNS ABOUT THE DRESSING CONTACT YOUR DOCTOR.    IF DRAINAGE IS PRESENT AT ANYTIME FROM YOUR DRESSING OR FROM YOUR INCISION CALL YOUR DOCTOR / SURGEON AS SOON AS POSSIBLE.      REASONS TO CALL YOUR DOCTOR:  TEMPERATURE .0 OR MORE  PERSISTANT NAUSEA OR VOMITTING - ESPECIALLY IF YOU ARE UNABLE TO EAT OR DRINK.  INCREASED LEVEL OF PAIN OR PAIN WHICH IS NOT CONTROLLED BY YOUR PAIN MEDICINE.  PERSISTENT DRAINAGE AT ANYTIME FROM YOUR SURGICAL AREA / INCISION.  PAIN, TENDERNESS OR SUDDEN SWELLING IN YOUR CALF REGION OF THE LOWER EXTREMITIES - THIS IS A POSSIBLE SIGN OF A BLOOD CLOT.  ANY CHANGES IN SENSATION IN YOUR BODY IN THE AREA OF YOUR SURGERY = NUMBNESS OR TINGLING.  ANY CHANGES IN CIRCULATION IN YOUR BODY IN THE AREA OF YOUR SURGERY = CHANGES  IN COLOR EITHER DARKER OR VERY PALE; OR  IF AREA FEELS COLD TO THE TOUCH AS COMPARED TO OTHER AREAS.  ANY CHANGES IN FUNCTION IN YOUR BODY IN THE AREA OF YOUR SURGERY = CHANGE IN MOVEMENT - UNABLE TO MOVE OR WIGGLE FINGERS, TOES OR FOOT OR ANY OTHER CHANGES IN NORMAL BODY FUNCTIONING.  FOR ANY OF THE ABOVE OR ANY OTHER QUESTIONS OR  CONCERNS CALL YOUR DOCTOR/SURGEON AS SOON AS POSSIBLE.            >30 minutes spent on discharge orders, coordination, and planning.     Pebbles Patrick MD  10/8/2024

## 2024-10-08 NOTE — PLAN OF CARE
Problem: PAIN - ADULT  Goal: Verbalizes/displays adequate comfort level or patient's stated pain goal  Description: INTERVENTIONS:  - Encourage pt to monitor pain and request assistance  - Assess pain using appropriate pain scale  - Administer analgesics based on type and severity of pain and evaluate response  - Implement non-pharmacological measures as appropriate and evaluate response  - Consider cultural and social influences on pain and pain management  - Manage/alleviate anxiety  - Utilize distraction and/or relaxation techniques  - Monitor for opioid side effects  - Notify MD/LIP if interventions unsuccessful or patient reports new pain  - Anticipate increased pain with activity and pre-medicate as appropriate  Outcome: Adequate for Discharge     Problem: SAFETY ADULT - FALL  Goal: Free from fall injury  Description: INTERVENTIONS:  - Assess pt frequently for physical needs  - Identify cognitive and physical deficits and behaviors that affect risk of falls.  - [place] fall precautions as indicated by assessment.  - Educate pt/family on patient safety including physical limitations  - Instruct pt to call for assistance with activity based on assessment  - Modify environment to reduce risk of injury  - Provide assistive devices as appropriate  - Consider OT/PT consult to assist with strengthening/mobility  - Encourage toileting schedule  Outcome: Adequate for Discharge

## 2024-10-08 NOTE — PLAN OF CARE
Post-op day 1. Alert and oriented x4 on room air w/ CPAP at night. No calls from tele. Eliquis for DVT prophylaxis. Scheduled toradol and extra strength tylenol given, pain controlled. PRN pain meds available. Up standby with walker, tolerating well. Plan for home w/ HH.     Problem: Patient Centered Care  Goal: Patient preferences are identified and integrated in the patient's plan of care  Description: Interventions:  - What would you like us to know as we care for you?   - Provide timely, complete, and accurate information to patient/family  - Incorporate patient and family knowledge, values, beliefs, and cultural backgrounds into the planning and delivery of care  - Encourage patient/family to participate in care and decision-making at the level they choose  - Honor patient and family perspectives and choices  Outcome: Progressing     Problem: Patient/Family Goals  Goal: Patient/Family Long Term Goal  Description: Patient's Long Term Goal:     Interventions:  -   - See additional Care Plan goals for specific interventions  Outcome: Progressing  Goal: Patient/Family Short Term Goal  Description: Patient's Short Term Goal:     Interventions:   -   - See additional Care Plan goals for specific interventions  Outcome: Progressing     Problem: PAIN - ADULT  Goal: Verbalizes/displays adequate comfort level or patient's stated pain goal  Description: INTERVENTIONS:  - Encourage pt to monitor pain and request assistance  - Assess pain using appropriate pain scale  - Administer analgesics based on type and severity of pain and evaluate response  - Implement non-pharmacological measures as appropriate and evaluate response  - Consider cultural and social influences on pain and pain management  - Manage/alleviate anxiety  - Utilize distraction and/or relaxation techniques  - Monitor for opioid side effects  - Notify MD/LIP if interventions unsuccessful or patient reports new pain  - Anticipate increased pain with activity  and pre-medicate as appropriate  Outcome: Progressing     Problem: SAFETY ADULT - FALL  Goal: Free from fall injury  Description: INTERVENTIONS:  - Assess pt frequently for physical needs  - Identify cognitive and physical deficits and behaviors that affect risk of falls.  - [place] fall precautions as indicated by assessment.  - Educate pt/family on patient safety including physical limitations  - Instruct pt to call for assistance with activity based on assessment  - Modify environment to reduce risk of injury  - Provide assistive devices as appropriate  - Consider OT/PT consult to assist with strengthening/mobility  - Encourage toileting schedule  Outcome: Progressing

## 2024-10-08 NOTE — PROGRESS NOTES
Received discharge order. Patient passed the PT/OT. Cleared from , surgery, and hospitalist to go home with home health. Patient provided with briatric walker to go home with. Patient has home health set up already. And   notified about discharge order. AVS printed and given to patient. Explained the AVS and all questions addressed,

## 2025-05-05 ENCOUNTER — HOSPITAL ENCOUNTER (INPATIENT)
Facility: HOSPITAL | Age: 60
LOS: 1 days | Discharge: HOME | End: 2025-05-08
Attending: EMERGENCY MEDICINE | Admitting: STUDENT IN AN ORGANIZED HEALTH CARE EDUCATION/TRAINING PROGRAM
Payer: COMMERCIAL

## 2025-05-05 ENCOUNTER — APPOINTMENT (OUTPATIENT)
Dept: GENERAL RADIOLOGY | Facility: HOSPITAL | Age: 60
End: 2025-05-05
Payer: COMMERCIAL

## 2025-05-05 DIAGNOSIS — J81.0 ACUTE PULMONARY EDEMA (HCC): Primary | ICD-10-CM

## 2025-05-05 LAB
ALBUMIN SERPL-MCNC: 4.4 G/DL (ref 3.2–4.8)
ALBUMIN/GLOB SERPL: 1.5 {RATIO} (ref 1–2)
ALP LIVER SERPL-CCNC: 76 U/L (ref 45–117)
ALT SERPL-CCNC: 30 U/L (ref 10–49)
ANION GAP SERPL CALC-SCNC: 10 MMOL/L (ref 0–18)
APTT PPP: 29.9 SECONDS (ref 23–36)
AST SERPL-CCNC: 39 U/L (ref ?–34)
BASOPHILS # BLD AUTO: 0.04 X10(3) UL (ref 0–0.2)
BASOPHILS NFR BLD AUTO: 0.6 %
BILIRUB SERPL-MCNC: 1.9 MG/DL (ref 0.2–1.1)
BUN BLD-MCNC: 14 MG/DL (ref 9–23)
CALCIUM BLD-MCNC: 9.6 MG/DL (ref 8.7–10.6)
CHLORIDE SERPL-SCNC: 100 MMOL/L (ref 98–112)
CO2 SERPL-SCNC: 28 MMOL/L (ref 21–32)
CREAT BLD-MCNC: 1.25 MG/DL (ref 0.7–1.3)
EGFRCR SERPLBLD CKD-EPI 2021: 66 ML/MIN/1.73M2 (ref 60–?)
EOSINOPHIL # BLD AUTO: 0.09 X10(3) UL (ref 0–0.7)
EOSINOPHIL NFR BLD AUTO: 1.4 %
ERYTHROCYTE [DISTWIDTH] IN BLOOD BY AUTOMATED COUNT: 13.6 %
GLOBULIN PLAS-MCNC: 2.9 G/DL (ref 2–3.5)
GLUCOSE BLD-MCNC: 137 MG/DL (ref 70–99)
HCT VFR BLD AUTO: 43.7 % (ref 39–53)
HGB BLD-MCNC: 16 G/DL (ref 13–17.5)
IMM GRANULOCYTES # BLD AUTO: 0.03 X10(3) UL (ref 0–1)
IMM GRANULOCYTES NFR BLD: 0.5 %
INR BLD: 1.27 (ref 0.8–1.2)
LYMPHOCYTES # BLD AUTO: 0.45 X10(3) UL (ref 1–4)
LYMPHOCYTES NFR BLD AUTO: 6.8 %
MCH RBC QN AUTO: 34.6 PG (ref 26–34)
MCHC RBC AUTO-ENTMCNC: 36.6 G/DL (ref 31–37)
MCV RBC AUTO: 94.4 FL (ref 80–100)
MONOCYTES # BLD AUTO: 0.54 X10(3) UL (ref 0.1–1)
MONOCYTES NFR BLD AUTO: 8.2 %
NEUTROPHILS # BLD AUTO: 5.46 X10 (3) UL (ref 1.5–7.7)
NEUTROPHILS # BLD AUTO: 5.46 X10(3) UL (ref 1.5–7.7)
NEUTROPHILS NFR BLD AUTO: 82.5 %
NT-PROBNP SERPL-MCNC: 8629 PG/ML (ref ?–125)
OSMOLALITY SERPL CALC.SUM OF ELEC: 289 MOSM/KG (ref 275–295)
PLATELET # BLD AUTO: 135 10(3)UL (ref 150–450)
POTASSIUM SERPL-SCNC: 4.1 MMOL/L (ref 3.5–5.1)
PROT SERPL-MCNC: 7.3 G/DL (ref 5.7–8.2)
PROTHROMBIN TIME: 16 SECONDS (ref 11.6–14.8)
RBC # BLD AUTO: 4.63 X10(6)UL (ref 4.3–5.7)
SODIUM SERPL-SCNC: 138 MMOL/L (ref 136–145)
TROPONIN I SERPL HS-MCNC: 40 NG/L (ref ?–53)
WBC # BLD AUTO: 6.6 X10(3) UL (ref 4–11)

## 2025-05-05 PROCEDURE — 71045 X-RAY EXAM CHEST 1 VIEW: CPT

## 2025-05-05 RX ORDER — HYDRALAZINE HYDROCHLORIDE 20 MG/ML
10 INJECTION INTRAMUSCULAR; INTRAVENOUS ONCE
Status: COMPLETED | OUTPATIENT
Start: 2025-05-05 | End: 2025-05-05

## 2025-05-05 RX ORDER — NITROGLYCERIN 20 MG/100ML
INJECTION INTRAVENOUS
Status: DISCONTINUED | OUTPATIENT
Start: 2025-05-05 | End: 2025-05-08

## 2025-05-05 RX ORDER — ASPIRIN 81 MG/1
324 TABLET, CHEWABLE ORAL ONCE
Status: COMPLETED | OUTPATIENT
Start: 2025-05-05 | End: 2025-05-05

## 2025-05-05 RX ORDER — NITROGLYCERIN 0.4 MG/1
0.4 TABLET SUBLINGUAL ONCE
Status: COMPLETED | OUTPATIENT
Start: 2025-05-05 | End: 2025-05-05

## 2025-05-05 RX ORDER — FUROSEMIDE 10 MG/ML
40 INJECTION INTRAMUSCULAR; INTRAVENOUS ONCE
Status: COMPLETED | OUTPATIENT
Start: 2025-05-05 | End: 2025-05-05

## 2025-05-06 ENCOUNTER — APPOINTMENT (OUTPATIENT)
Dept: CV DIAGNOSTICS | Facility: HOSPITAL | Age: 60
End: 2025-05-06
Attending: NURSE PRACTITIONER
Payer: COMMERCIAL

## 2025-05-06 LAB
ALBUMIN SERPL-MCNC: 4.2 G/DL (ref 3.2–4.8)
ALBUMIN/GLOB SERPL: 1.6 {RATIO} (ref 1–2)
ALP LIVER SERPL-CCNC: 70 U/L (ref 45–117)
ALT SERPL-CCNC: 26 U/L (ref 10–49)
ANION GAP SERPL CALC-SCNC: 8 MMOL/L (ref 0–18)
AST SERPL-CCNC: 30 U/L (ref ?–34)
ATRIAL RATE: 97 BPM
BASOPHILS # BLD AUTO: 0.03 X10(3) UL (ref 0–0.2)
BASOPHILS NFR BLD AUTO: 0.6 %
BILIRUB SERPL-MCNC: 1.9 MG/DL (ref 0.2–1.1)
BUN BLD-MCNC: 13 MG/DL (ref 9–23)
CALCIUM BLD-MCNC: 9.2 MG/DL (ref 8.7–10.6)
CHLORIDE SERPL-SCNC: 100 MMOL/L (ref 98–112)
CO2 SERPL-SCNC: 30 MMOL/L (ref 21–32)
CREAT BLD-MCNC: 1.39 MG/DL (ref 0.7–1.3)
EGFRCR SERPLBLD CKD-EPI 2021: 58 ML/MIN/1.73M2 (ref 60–?)
EOSINOPHIL # BLD AUTO: 0.04 X10(3) UL (ref 0–0.7)
EOSINOPHIL NFR BLD AUTO: 0.8 %
ERYTHROCYTE [DISTWIDTH] IN BLOOD BY AUTOMATED COUNT: 13.6 %
GLOBULIN PLAS-MCNC: 2.7 G/DL (ref 2–3.5)
GLUCOSE BLD-MCNC: 132 MG/DL (ref 70–99)
HCT VFR BLD AUTO: 39 % (ref 39–53)
HGB BLD-MCNC: 14.3 G/DL (ref 13–17.5)
IMM GRANULOCYTES # BLD AUTO: 0.01 X10(3) UL (ref 0–1)
IMM GRANULOCYTES NFR BLD: 0.2 %
LYMPHOCYTES # BLD AUTO: 0.43 X10(3) UL (ref 1–4)
LYMPHOCYTES NFR BLD AUTO: 8.6 %
MAGNESIUM SERPL-MCNC: 1.7 MG/DL (ref 1.6–2.6)
MCH RBC QN AUTO: 34.9 PG (ref 26–34)
MCHC RBC AUTO-ENTMCNC: 36.7 G/DL (ref 31–37)
MCV RBC AUTO: 95.1 FL (ref 80–100)
MONOCYTES # BLD AUTO: 0.49 X10(3) UL (ref 0.1–1)
MONOCYTES NFR BLD AUTO: 9.8 %
NEUTROPHILS # BLD AUTO: 4.01 X10 (3) UL (ref 1.5–7.7)
NEUTROPHILS # BLD AUTO: 4.01 X10(3) UL (ref 1.5–7.7)
NEUTROPHILS NFR BLD AUTO: 80 %
OSMOLALITY SERPL CALC.SUM OF ELEC: 288 MOSM/KG (ref 275–295)
P AXIS: 30 DEGREES
P-R INTERVAL: 202 MS
PLATELET # BLD AUTO: 116 10(3)UL (ref 150–450)
POTASSIUM SERPL-SCNC: 3.8 MMOL/L (ref 3.5–5.1)
PROT SERPL-MCNC: 6.9 G/DL (ref 5.7–8.2)
Q-T INTERVAL: 422 MS
QRS DURATION: 162 MS
QTC CALCULATION (BEZET): 535 MS
R AXIS: 27 DEGREES
RBC # BLD AUTO: 4.1 X10(6)UL (ref 4.3–5.7)
SODIUM SERPL-SCNC: 138 MMOL/L (ref 136–145)
T AXIS: 114 DEGREES
TROPONIN I SERPL HS-MCNC: 23 NG/L (ref ?–53)
TROPONIN I SERPL HS-MCNC: 28 NG/L (ref ?–53)
TROPONIN I SERPL HS-MCNC: 33 NG/L (ref ?–53)
VENTRICULAR RATE: 97 BPM
WBC # BLD AUTO: 5 X10(3) UL (ref 4–11)

## 2025-05-06 PROCEDURE — 93306 TTE W/DOPPLER COMPLETE: CPT | Performed by: NURSE PRACTITIONER

## 2025-05-06 PROCEDURE — 5A09357 ASSISTANCE WITH RESPIRATORY VENTILATION, LESS THAN 24 CONSECUTIVE HOURS, CONTINUOUS POSITIVE AIRWAY PRESSURE: ICD-10-PCS | Performed by: INTERNAL MEDICINE

## 2025-05-06 PROCEDURE — 99223 1ST HOSP IP/OBS HIGH 75: CPT | Performed by: HOSPITALIST

## 2025-05-06 RX ORDER — PROCHLORPERAZINE MALEATE 10 MG
10 TABLET ORAL EVERY 6 HOURS PRN
Status: DISCONTINUED | OUTPATIENT
Start: 2025-05-06 | End: 2025-05-08

## 2025-05-06 RX ORDER — SPIRONOLACTONE 25 MG/1
25 TABLET ORAL DAILY
Status: DISCONTINUED | OUTPATIENT
Start: 2025-05-06 | End: 2025-05-08

## 2025-05-06 RX ORDER — PROCHLORPERAZINE EDISYLATE 5 MG/ML
5 INJECTION INTRAMUSCULAR; INTRAVENOUS EVERY 8 HOURS PRN
Status: DISCONTINUED | OUTPATIENT
Start: 2025-05-06 | End: 2025-05-08

## 2025-05-06 RX ORDER — BISACODYL 10 MG
10 SUPPOSITORY, RECTAL RECTAL
Status: DISCONTINUED | OUTPATIENT
Start: 2025-05-06 | End: 2025-05-08

## 2025-05-06 RX ORDER — FUROSEMIDE 10 MG/ML
40 INJECTION INTRAMUSCULAR; INTRAVENOUS ONCE
Status: COMPLETED | OUTPATIENT
Start: 2025-05-06 | End: 2025-05-06

## 2025-05-06 RX ORDER — POTASSIUM CHLORIDE 1500 MG/1
40 TABLET, EXTENDED RELEASE ORAL ONCE
Status: COMPLETED | OUTPATIENT
Start: 2025-05-06 | End: 2025-05-06

## 2025-05-06 RX ORDER — POLYETHYLENE GLYCOL 3350 17 G/17G
17 POWDER, FOR SOLUTION ORAL DAILY PRN
Status: DISCONTINUED | OUTPATIENT
Start: 2025-05-06 | End: 2025-05-08

## 2025-05-06 RX ORDER — SODIUM PHOSPHATE, DIBASIC AND SODIUM PHOSPHATE, MONOBASIC 7; 19 G/230ML; G/230ML
1 ENEMA RECTAL ONCE AS NEEDED
Status: DISCONTINUED | OUTPATIENT
Start: 2025-05-06 | End: 2025-05-08

## 2025-05-06 RX ORDER — ONDANSETRON 2 MG/ML
4 INJECTION INTRAMUSCULAR; INTRAVENOUS EVERY 6 HOURS PRN
Status: DISCONTINUED | OUTPATIENT
Start: 2025-05-06 | End: 2025-05-06

## 2025-05-06 RX ORDER — METOPROLOL SUCCINATE 100 MG/1
100 TABLET, EXTENDED RELEASE ORAL
Status: DISCONTINUED | OUTPATIENT
Start: 2025-05-06 | End: 2025-05-08

## 2025-05-06 RX ORDER — SENNOSIDES 8.6 MG
17.2 TABLET ORAL NIGHTLY PRN
Status: DISCONTINUED | OUTPATIENT
Start: 2025-05-06 | End: 2025-05-08

## 2025-05-06 RX ORDER — ACETAMINOPHEN 500 MG
500 TABLET ORAL EVERY 4 HOURS PRN
Status: DISCONTINUED | OUTPATIENT
Start: 2025-05-06 | End: 2025-05-08

## 2025-05-06 RX ORDER — FUROSEMIDE 10 MG/ML
40 INJECTION INTRAMUSCULAR; INTRAVENOUS
Status: DISCONTINUED | OUTPATIENT
Start: 2025-05-06 | End: 2025-05-07

## 2025-05-06 NOTE — HISTORICAL OFFICE NOTE
Facility Logo [facility]  [address], [address]th [place] [address]  [phone]      Mitesh Flood  Progress Note  Demographics:  Name: Mitesh Flood YOB: 1965  Age: 59, Male Medical Record No: [id number]  Visited Date/Time: 10/21/2024 11:40 AM    Chief Complaints  3 month follow up  History of Present Illness  59-year-old male who was admitted in late April and early May with heart failure symptoms.  He was in A-fib with rapid ventricular response.  He has baseline left bundle branch block    He had a AKILAH cardioversion and was put on goal-directed medical therapy and followed up with Morgan Diaz.  Patient has maintained sinus rhythm and repeat echocardiogram showed normalization of his ejection fraction.  He has osteoarthritis of his knee and may need knee surgery    He feels good now.  No chest pain or shortness of breath.  Maintaining sinus rhythm by EKG    Has a sleep study scheduled this week    Visit October 21, 2024  Patient has been diagnosed with sleep apnea.  Also had left knee replacement surgery 2 weeks ago.    No A-fib that he has been able to detect.  He has an Apple Watch but has not been wearing it  Cardiac risk factors Never smoked  Past Medical History  1.Atrial fibrillation with rapid ventricular response (HCC)  2.Hyponatremia  3.Acute congestive heart failure, unspecified heart failure type  4.Mixed hyperlipidemia  5.Vitamin D deficiency  6.LENARD (obstructive sleep apnea)  7.Essential hypertension  Family History  No significant family history noted.  Social History  Smoking status Never smoked  Tobacco usage - No (Non-smoker (finding))  Review of systems  Cardiovascular No history of Chest pain, SERVIN, Palpitations, Syncope, PND, Orthopnea, Edema and Claudication  Physical Examination  Vitals Right Arm Sitting  / 80 mmHg, Pulse rate 87 bpm, Height in 6' 4\", BMI: 38.3, Weight in 315 lbs (or) 143 kgs and BSA : 2.82 cc/m²  General  Appearance No Acute Distress, Well groomed and Normal Body habitus  Cardiovascular   EKG/Other abnormalities  5/17/24:  EKG:  SR LBBB HR: 72 BPM, QRS:  166 ms, QTc:  467 ms, KY:  198 ms.  General - NAD  PEERLA/EOMI  JVD - normal  CTA Bilaterally  CV- RRR, S1/S2, No murmurs  GI- Soft, Nontender  Extremities normal pulses  Mood/Affect Congruent  Skin no lesions  Joint/Musculoskeletal - Normal  Allergies  1.Lisinopril(Reaction:Coughing, Severity:Mild)  Medications  1.amLODIPine 5 mg tablet, Take 1 tablet orally once a day.  2.Eliquis 5 mg tablet, Take 1 tablet orally 2 times a day.  3.Entresto 24 mg-26 mg tablet, Take 1 tablet orally 2 times a day.  4.losartan 100 mg tablet, Take 1 tablet orally once a day.  5.metoprolol succinate  mg tablet,extended release 24 hr, Take 1 tablet orally 2 times a day.  6.Multiple Vitamins-Minerals (MULTI-VITAMIN/MINERALS) Oral Tab, Take 1 tablet by mouth daily.  7.spironolactone 25 MG Oral Tab, Take 1 tablet (25 mg total) by mouth daily.  8.torsemide 20 MG Oral Tab, Take 1 tablet (20 mg total) by mouth daily.  Impression  1.Acute congestive heart failure, unspecified heart failure type (HCC)  2.SOB (shortness of breath)  3.Atrial fibrillation with rapid ventricular response (HCC)  4.Acute congestive heart failure, unspecified heart failure type  5.Mixed hyperlipidemia  Assessment & Plan  ===============================  Cardiac Testing Personally Reviewed    ECG Reviewed -sinus rhythm with left bundle branch    Echo Results walk repeat echocardiogram June 28, 2024 shows normal EF.  Left atrial dimension is normal at 4 cm    AKILAH May 2, 2024 showed a severely decreased EF at 35% with global hypokinesis.  Stress Test Results []    Monitor Results []    EP Procedures: Cardioversion May 2, 2024  ==============================    Problem List:    # Persistent atrial fibrillation with tachycardia mediated cardiomyopathy  - Maintaining sinus rhythm.  And normal EF recently.    We  discussed medical therapy with antiarrhythmic drugs, catheter ablation etc.   -In 6 months we will do a 7-day MCT monitor and follow-up after that       # Heart failure with reduced ejection fraction normalized in sinus rhythm  - Maintaining continue current medical therapy.  If you have 1 day got an ablation and had maintaining sinus rhythm for many months I would consider removing many of his heart failure medications     # Essential hypertension on amlodipine     # Possible obstructive sleep apnea has a sleep study scheduled     # Osteoarthritis.  -Left knee replacement surgery recently      Continue current meds except as outlined above.  =====================================    Check out Items:  Follow-up in 6 months with a 7-day MCT monitor before that visit  Labs and Diagnostics ordered  1.EKG (electrocardiogram) (Today)  2.Monitor - MCT/Telemetry (6 Months)  Future appointments  1.Referral Visit - Shara Vance ([email or web address]) : (Today)  2.Follow up visit - Adrian Saul MD (6 Months)  Miscellaneous  1.Reviewed trans thoracic echocardiogram with the patient.  2.Weight monitoring (regime/therapy)  Nurses documentation  Refills: none  Upcoming surgeries: none   Use of assisted devices: none  EKG: Done  (AR, RODERICK)     Patient instructions  Follow-up in 6 months with a 7-day MCT monitor before that visit  Lab Details  BASIC METABOLIC PANEL (8)  05/02/2024 06:33:33 AM  GLUCOSE 103 70-99 mg/dL H F  SODIUM 135 136-145 mmol/L L F  POTASSIUM 3.1 3.5-5.1 mmol/L L F  CHLORIDE 100  mmol/L  F  CO2 26.0 21.0-32.0 mmol/L  F  ANION GAP 9 0-18 mmol/L  F  BUN 16 9-23 mg/dL  F  CREATININE 1.23 0.70-1.30 mg/dL  F  CALCIUM 9.6 8.5-10.1 mg/dL  F  OSMOLALITY CALCULATED 281 275-295 mOsm/kg  F  E GFR CR 68 >=60 mL/min/1.73m2  F  PTT, ACTIVATED  05/01/2024 02:58:48 PM  PTT 29.6 23.3-35.6 seconds  F  LIPID PANEL  04/28/2024 06:43:45 PM  CHOLESTEROL 156 <200 mg/dL  F  HDL CHOL 51 40-59  mg/dL  F  TRIGLYCERIDES 115  mg/dL  F  LDL CHOLESTROL 84 <100 mg/dL  F  VLDL 18 0-30 mg/dL  F  NON HDL CHOL 105 <130 mg/dL  F  Diagnostics Details  Trans Thoracic Echocardiogram 06/28/2024  1.The study quality is average.    2.The left ventricle is normal in size. Global left ventricular systolic function is normal. The left ventricle diastolic function is impaired (Grade I) with normal left atrial pressure. The wall thickness is within normal limits. The left ventricular ejection fraction is 55-60%. No regional wall motion abnormality is noted.    3.The right ventricle is normal in size. Right ventricular systolic function is normal.    4.Aortic root diameter is 4.0 cms. Dilatation of the aortic root is noted. Ascending aorta diameter is 3.6 cms.    5.Focal thickening of the aortic valve cusps is noted without significant stenosis or insufficiency.    CPOE Orders carried out by: Kacey PAIGE  Care Providers: Adrian Saul MD and Kacey PAIGE  Electronically Authenticated by  Adrian Saul MD  10/21/2024 12:22:26 PM  Disclaimer: Components of this note were documented using voice recognition system and are subject to errors not corrected at proofreading. Contact the author of this note for any clarifications.

## 2025-05-06 NOTE — H&P
[facility]     Mitesh Flood Patient Status:  Observation    1965 MRN [id number]   Location [facility] 2NE-A Attending Carmita Sherman,    Hosp Day # 0 PCP Shara Vance MD     Chief Complaint: SOB    Subjective:    History of Present Illness:     Mitesh Flood is a 59 year old male with history of atrial fibrillation and chronic systolic heart failure presents emergency room with shortness of breath.  Started a few days ago has been getting progressively worse mostly with exertion but today he started having some shortness of breath at rest.  No chest pain, palpitations.  No fevers, chills, nausea, vomiting, diarrhea.  No swelling in the lower extremities no orthopnea or PND.    History/Other:    Past Medical History:  Past Medical History[1]  Past Surgical History:   Past Surgical History[2]   Family History:   Family History[3]  Social History:    reports that he has never smoked. He has never used smokeless tobacco. He reports current alcohol use of about 15.0 standard drinks of alcohol per week. He reports that he does not use drugs.     Allergies: Allergies[4]    Medications:  Medications Ordered Prior to Encounter[5]    Review of Systems:   A comprehensive review of systems was completed.    Pertinent positives and negatives noted in the HPI.    Objective:   Physical Exam:    /88 (BP Location: Left arm)   Pulse 82   Temp 98.2 °F (36.8 °C) (Oral)   Resp 16   Wt (!) 319 lb 14.2 oz (145.1 kg)   SpO2 91%   BMI 39.98 kg/m²   General: No acute distress, Alert  Respiratory: No rhonchi, no wheezes  Cardiovascular: S1, S2. Regular rate and rhythm  Abdomen: Soft, Non-tender, non-distended, positive bowel sounds  Neuro: No new focal deficits  Extremities: No edema      Results:    Labs:      Labs Last 24 Hours:    Recent Labs   Lab 25  2208   RBC 4.63   HGB 16.0   HCT 43.7   MCV 94.4   MCH 34.6*   MCHC 36.6   RDW 13.6   NEPRELIM 5.46   WBC 6.6    .0*       Recent Labs   Lab 05/05/25 2208   *   BUN 14   CREATSERUM 1.25   EGFRCR 66   CA 9.6   ALB 4.4      K 4.1      CO2 28.0   ALKPHO 76   AST 39*   ALT 30   BILT 1.9*   TP 7.3       Estimated Glomerular Filtration Rate: 66 mL/min/1.73m2 (result from lab).    Lab Results   Component Value Date    INR 1.27 (H) 05/05/2025    INR 1.13 04/28/2024       Recent Labs   Lab 05/05/25 2208   TROPHS 40       Recent Labs   Lab 05/05/25 2208   PBNP 8,629*       No results for input(s): \"PCT\" in the last 168 hours.    Imaging: Imaging data reviewed in Epic.    Assessment & Plan:      # Acute on chronic systolic heart failure  - Continue IV lasix  - will continue b-blocker, entresto, spironolactone  - cardiology on consult  - Patient just had a echo done on 2 May 2025 so will defer to cardiology if they need to repeat an echo.    # Atrial fibrillation  - Will continue on b-blocker for rate control.  - Will continue on Eliquis for anticoagulation.    # LENARD  - Will continue on BiPAP.    Plan of care discussed with patient at bedside.    Greyson Salazar, DO    Supplementary Documentation:     The 21st Century Cures Act makes medical notes like these available to patients in the interest of transparency. Please be advised this is a medical document. Medical documents are intended to carry relevant information, facts as evident, and the clinical opinion of the practitioner. The medical note is intended as peer to peer communication and may appear blunt or direct. It is written in medical language and may contain abbreviations or verbiage that are unfamiliar.                                     [1]   Past Medical History:   1St degree AV block    Arrhythmia    afib    Essential hypertension    High blood pressure    History of blood transfusion    LBBB (left bundle branch block)    Osteoarthritis    Shortness of breath    Sleep apnea    cpap   [2]   Past Surgical History:  Procedure  Laterality Date    Colonoscopy      Hernia surgery      Total knee replacement      Wrist fracture surgery Left    [3]   Family History  Problem Relation Age of Onset    Heart Disorder Father     Dementia Father     Skin cancer Sister     Hypertension Sister     Hypertension Brother     Other (Arn Cancer) Brother     No Known Problems Brother     Blindness Brother     Hypertension Brother    [4]   Allergies  Allergen Reactions    Lisinopril Coughing   [5]   No current facility-administered medications on file prior to encounter.     Current Outpatient Medications on File Prior to Encounter   Medication Sig Dispense Refill    metoprolol succinate  MG Oral Tablet 24 Hr Take 1 tablet (100 mg total) by mouth 2x Daily(Beta Blocker). 60 tablet 6    spironolactone 25 MG Oral Tab Take 1 tablet (25 mg total) by mouth daily. 30 tablet 6    torsemide 20 MG Oral Tab Take 1 tablet (20 mg total) by mouth daily. 30 tablet 6    apixaban 5 MG Oral Tab Take 1 tablet (5 mg total) by mouth 2 (two) times daily. 60 tablet 6    sacubitril-valsartan (ENTRESTO) 24-26 MG Oral Tab Take 1 tablet by mouth 2 (two) times daily. 60 tablet 6

## 2025-05-06 NOTE — ED PROVIDER NOTES
Patient Seen in: [facility] Emergency Department      History     Chief Complaint   Patient presents with    Chest Pain Angina     Stated Complaint: chest pain, SHAYLA with exertion,    Subjective:   HPI    59-year-old male history of hypertension, paroxysmal atrial fibrillation on Eliquis, presents emergency room for evaluation of shortness of breath.  States the last few days has been feeling short of breath with exertion, symptoms are worsening today therefore he came to the emergency room.  Denies any chest pain.  Denies fevers or chills.  Denies lower extremity swelling or calf pain denies PND orthopnea.  Patient reports he recently did have echo performed, on review of medical records on 5/2/2025 echo EF of 35%.  History of Present Illness               Objective:     Past Medical History:    1St degree AV block    Arrhythmia    afib    Essential hypertension    High blood pressure    LBBB (left bundle branch block)    Osteoarthritis    Sleep apnea    cpap              Past Surgical History:   Procedure Laterality Date    Wrist fracture surgery Left                 Social History     Socioeconomic History    Marital status:    Tobacco Use    Smoking status: Never    Smokeless tobacco: Never   Vaping Use    Vaping status: Never Used   Substance and Sexual Activity    Alcohol use: Yes     Alcohol/week: 15.0 standard drinks of alcohol     Types: 15 Standard drinks or equivalent per week     Comment: 3 times a week    Drug use: No   Other Topics Concern    Caffeine Concern No    Exercise Yes    Seat Belt Yes    Special Diet No    Stress Concern Yes    Weight Concern No     Social Drivers of Health     Food Insecurity: No Food Insecurity (10/7/2024)    Food Insecurity     Food Insecurity: Never true   Transportation Needs: No Transportation Needs (10/7/2024)    Transportation Needs     Lack of Transportation: No   Housing Stability: Low Risk  (10/7/2024)    Housing Stability     Housing Instability: No                                 Physical Exam     ED Triage Vitals   BP 05/05/25 2215 (!) 164/109   Pulse 05/05/25 2215 91   Resp 05/05/25 2215 20   Temp 05/05/25 2312 97.7 °F (36.5 °C)   Temp src 05/05/25 2312 Temporal   SpO2 05/05/25 2215 94 %   O2 Device 05/05/25 2315 None (Room air)       Current Vitals:   Vital Signs  BP: (!) 170/115  Pulse: 86  Resp: 23  Temp: 97.7 °F (36.5 °C)  Temp src: Temporal  MAP (mmHg): (!) 132    Oxygen Therapy  SpO2: 95 %  O2 Device: None (Room air)        Physical Exam  GENERAL: Patient is awake, alert  HEENT: no scleral icterus.  Mucous membranes are moist, oropharynx is clear  HEART: Regular rate and rhythm, no murmurs.  LUNGS: Bibasilar rales.  No wheezing  ABDOMEN: Soft, nondistended,non tender  EXTREMITIES: No peripheral edema, no calf tenderness  Physical Exam                ED Course     Labs Reviewed   COMP METABOLIC PANEL (14) - Abnormal; Notable for the following components:       Result Value    Glucose 137 (*)     AST 39 (*)     Bilirubin, Total 1.9 (*)     All other components within normal limits   CBC WITH DIFFERENTIAL WITH PLATELET - Abnormal; Notable for the following components:    .0 (*)     MCH 34.6 (*)     Lymphocyte Absolute 0.45 (*)     All other components within normal limits   PRO BETA NATRIURETIC PEPTIDE - Abnormal; Notable for the following components:    Pro-Beta Natriuretic Peptide 8,629 (*)     All other components within normal limits   PROTHROMBIN TIME (PT) - Abnormal; Notable for the following components:    PT 16.0 (*)     INR 1.27 (*)     All other components within normal limits   TROPONIN I HIGH SENSITIVITY - Normal   PTT, ACTIVATED - Normal     EKG    Rate, intervals and axes as noted on EKG Report.  Rate: 97  Rhythm: Sinus Rhythm  Reading: Sinus rhythm with left bundle branch block.  No change compared to EKG dated May 2, 2024              Results            A total of 35 minutes of critical care time (exclusive of billable procedures)  was administered to manage the patient's cardiovascular instability due to his acute pulmonary edema.  This involved direct patient intervention, complex decision making, and/or extensive discussions with the patient, family, and clinical staff.                 MDM        Differential diagnosis before testing includes but not limited to pulm edema, pneumonia, pneumothorax, acute coronary syndrome, which is a medical condition that poses a threat to life/function    Past Medical History/comorbidities-as noted in HPI      Radiographic images  I personally reviewed the radiographs and my individual interpretation shows chest x-ray pulmonary edema  I also reviewed the official reports that showed findings can be seen with edema correlate for CHF.        Discussion of management (consult/physicians, social work, pharmacy,ect) [facility]    External chart review included recent echo EF 35% as in HPI    Medications Provided: Sublingual nitroglycerin, IV nitroglycerin, IV Lasix, hydralazine    Course of Events during Emergency Room Visit include patient placed on cardiac monitor and pulse ox.  Exam consistent with acute pulmonary edema, was given nitroglycerin, Lasix.  Patient reports she is feeling better, CBC and chemistry were unremarkable.  Troponin 35.  BNP 8629.  Discussed with hospitalist and cardiology, will admit for further evaluation treatment.  Discussed all results with the patient and wife they agree with plan  Shared decision making was utilized           Disposition:    Admission  I have discussed with the patient the results of test, differential diagnosis, and treatment plan. They expressed clear understanding of these instructions and agrees to the plan provided.       Note to patient: The 21st Century Cures Act makes medical notes like these available to patients in the interest of transparency. However, this is a medical document intended as peer to peer  communication. It is written in medical language and may contain abbreviations or verbiage that are unfamiliar. It may appear blunt or direct. Medical documents are intended to carry relevant information, facts as evident, and the clinical opinion of the practitioner.                 Admission disposition: 5/5/2025 11:35 PM           Medical Decision Making      Disposition and Plan     Clinical Impression:  1. Acute pulmonary edema (HCC)         Disposition:  Admit  5/5/2025 11:35 pm    Follow-up:  No follow-up provider specified.        Medications Prescribed:  Current Discharge Medication List          Supplementary Documentation:         Hospital Problems       Present on Admission  Date Reviewed: 10/7/2024          ICD-10-CM Noted POA    * (Principal) Acute pulmonary edema (HCC) J81.0 5/5/2025 Unknown

## 2025-05-06 NOTE — ED INITIAL ASSESSMENT (HPI)
Pt to the emergency room for ongoing chest pain that worsened this evening. Pt states that the pain feels worse than when he was in afib. Pt states he can't catch his breath and that his breathing worsens with exertion. Pt denies dizziness, but states he feels weaker in the past week. AOx4 , breathing easy at rest. No other complaints

## 2025-05-06 NOTE — CONSULTS
Cardiology Consultation    Mitesh Flood Patient Status:  Observation    1965 MRN [id number]   [facility] 2NE-A Attending Abhishek Isbell MD   Hosp Day # 0 PCP Shara Vance MD     Reason for Consultation:  CHF, HTN urgency      History of Present Illness:  Mitesh Flood is a a(n) 59 year old male. Very nice morbidly obese tabitha, follows with Dr. Saul.  H/o PAF, tachy mediated cardiomyopathy, EF 35-40%.  AKILAH/DCC May 2024.  HTN, LENARD, not terribly compliant with CPAP.  Clean cath 24.  He has been feeling more fatigued the past week.  The past 36 hours he has had had progressive SERVIN, much worse yesterday.  He came to the ER.  /110.  CXR with CHF, pBNP elevated.  Given IV lasix, hydralazine, IV NTG and admitted.  Feels better this morning.  Has been in NSR.    History:  Past Medical History[1]  Past Surgical History[2]  Family History[3]      Allergies:  Allergies[4]    Medications:  Scheduled Medications[5]    Continuous Infusions:  Medication Infusions[6]    Social History:   reports that he has never smoked. He has never used smokeless tobacco. He reports current alcohol use of about 15.0 standard drinks of alcohol per week. He reports that he does not use drugs.    Review of Systems:  All systems were reviewed and are negative except as described above in HPI.    Physical Exam:      Temp:  [97.7 °F (36.5 °C)-98.2 °F (36.8 °C)] 98.2 °F (36.8 °C)  Pulse:  [82-96] 82  Resp:  [11-23] 16  BP: (125-170)/() 139/88  SpO2:  [91 %-98 %] 91 %    Last 3 Weights   25 0122 (!) 319 lb 14.2 oz (145.1 kg)   25 2228 (!) 310 lb (140.6 kg)   10/07/24 0554 (!) 316 lb (143.3 kg)   10/02/24 1013 (!) 310 lb (140.6 kg)   24 1012 (!) 316 lb 12.8 oz (143.7 kg)           General: No apparent distress  HEENT: No focal deficits.  Neck: supple. JVP normal  Cardiac: Regular rhythm, S1, S2 normal,   No rub or gallop.  No murmur.  Lungs: CTA  Abdomen: Soft, non-tender.    Extremities: No edema  Neurologic: no focal deficits  Skin: Warm and dry.          Telemetry: sinus    Laboratories and Data:  Diagnostics:    EKG, 5/6/2025:  LBBB    CXR, 5/6/2025:  reviewed    Labs:   HEM:  Recent Labs   Lab 05/05/25 2208 05/06/25  0623   WBC 6.6 5.0   HGB 16.0 14.3   .0* 116.0*       Chem:  Recent Labs   Lab 05/05/25 2208      K 4.1      CO2 28.0   BUN 14   CREATSERUM 1.25   CA 9.6   *       Recent Labs   Lab 05/05/25 2208   ALT 30   AST 39*   ALB 4.4       Recent Labs   Lab 05/05/25  2301   PTP 16.0*   INR 1.27*       No results for input(s): \"TROP\", \"CK\" in the last 168 hours.      Impression:   Hypertensive urgency in setting of volume overload.  Acute on chronic HFrEF, EF 35-40%.  LBBB, chronic  Morbid obesity  LENARD, fair compliance    Plan:  Stop IV NTG  Echo with doppler.  Same entresto, chanda, toprol.  Replace potassium.  Continue lasix 40 mg IVP BID.  Daily BMP's.      Yousuf Devlin MD  5/6/2025  6:45 AM  C5         [1]   Past Medical History:   1St degree AV block    Arrhythmia    afib    Essential hypertension    High blood pressure    History of blood transfusion    LBBB (left bundle branch block)    Osteoarthritis    Shortness of breath    Sleep apnea    cpap   [2]   Past Surgical History:  Procedure Laterality Date    Colonoscopy      Hernia surgery      Total knee replacement      Wrist fracture surgery Left    [3]   Family History  Problem Relation Age of Onset    Heart Disorder Father     Dementia Father     Skin cancer Sister     Hypertension Sister     Hypertension Brother     Other (Arn Cancer) Brother     No Known Problems Brother     Blindness Brother     Hypertension Brother    [4]   Allergies  Allergen Reactions    Lisinopril Coughing   [5]    apixaban  5 mg Oral BID    metoprolol succinate ER  100 mg Oral 2x Daily(Beta Blocker)    sacubitril-valsartan  1 tablet Oral BID    spironolactone  25 mg Oral Daily   [6]    nitroGLYCERIN in  dextrose 5% 20 mcg/min (05/05/25 0119)

## 2025-05-06 NOTE — PLAN OF CARE
Assumed care of pt at 0700. Alert and oriented x4. Maintains o2 sat on RA. NSR w/ BBB on tele monitor. No complaints of pain at this time. Continent of bowel and bladder, primofit intact d/t increased frequency. Up ad chintan. Personal possessions and call light within reach. Bed locked in lowest position. Updated on plan of care, verbalizes understanding.    Plan: IV lasix, Echo    Problem: Patient/Family Goals  Goal: Patient/Family Long Term Goal  Description: Patient's Long Term Goal: To stay healthy, out of the hospital  Interventions:  - Eat a heart healthy diet  - Exercise as tolerated  - Take all medications as prescribed  - Attend all follow up appointments  - See additional Care Plan goals for specific interventions  Outcome: Progressing  Goal: Patient/Family Short Term Goal  Description: Patient's Short Term Goal: To go home  Interventions:   - IV lasix  - Echo  - See additional Care Plan goals for specific interventions  Outcome: Progressing

## 2025-05-06 NOTE — PLAN OF CARE
Mitesh Flood Patient Status:  Emergency    1965 MRN [id number]   Location [facility] EMERGENCY DEPARTMENT Attending Barrie Jimenez,    Hosp Day # 0 PCP Shara Vance MD     Cardiology Nocturnal APN Note    Briefly: (Documentation from chart review)     Mitesh Flood is a 59 yr old M  who presented with increasing sob  in acute pulmonary edema and hypertensive bp 180/110 on ntg gtt at 10 mcg with bp 150/110s currently  PMH afib hyponatremia CHF HFrEF EF 35-40% LENARD HTN      Past Medical History[1]    Primary Cardiologist Kg    Vital Signs:       2025    11:12 PM 2025    11:15 PM   Vitals History   BP  170/115   Pulse  86   Resp  23   Temp 97.7 °F (36.5 °C)    SpO2  95 %        Labs:   Lab Results   Component Value Date    WBC 6.6 2025    HGB 16.0 2025    HCT 43.7 2025    .0 2025    CREATSERUM 1.25 2025    BUN 14 2025     2025    K 4.1 2025     2025    CO2 28.0 2025     2025    CA 9.6 2025    ALB 4.4 2025    ALKPHO 76 2025    BILT 1.9 2025    TP 7.3 2025    AST 39 2025    ALT 30 2025    PTT 29.9 2025    INR 1.27 2025    TROPHS 40 2025       Diagnostics:   XR CHEST AP PORTABLE  (CPT=71045)  Result Date: 2025  PROCEDURE:  XR CHEST AP PORTABLE  (CPT=71045)  TECHNIQUE:  AP chest radiograph was obtained.  COMPARISON:  [facility] , XR, XR CHEST AP PORTABLE  (CPT=71045), 2024, 3:05 PM.  INDICATIONS:  chest pain, SHAYLA with exertion,  PATIENT STATED HISTORY: (As transcribed by Technologist)  Difficulty breathing, chest pain, and shortness of breath.    FINDINGS:  Cardiomegaly with pulmonary vasculature redistribution and interstitial prominence.  Bibasilar bronchovascular opacities, slightly greater on the right may be due to atelectasis, early consolidative process cannot be excluded, correlate clinically.  No pleural effusion or  pneumothorax.  Stable left upper lobe calcified granuloma.            CONCLUSION:  1. Findings as detailed above can be seen with edema, correlate clinically with congestive heart failure.   LOCATION:  [facility]      Dictated by ([facility]): Laura Solis MD on 2025 at 10:29 PM     Finalized by (CST): Laura Solis MD on 2025 at 10:30 PM       Echo 24  Transthoracic Echocardiogram     Name:Mitesh Flood     Date:   2024   :    1965   Ht:    (76in)    BP:   MRN:    [id number]      Age:    58years      Wt:    (327lb)   HR:   91bpm   Loc:    [facility]         Gndr:   M            BSA:   2.73m^2   Sonographer: GREGORY Segundo MS     Ordering:    Du Kahn     ----------------------------------------------------------------------------   History/Indications:  Atrial fibrillation.     ----------------------------------------------------------------------------   Procedure information:  A transthoracic complete 2D study was performed.   Additional evaluation included M-mode, complete spectral Doppler, and color   Doppler.  Patient status:  Inpatient.  Location:  Bedside.    No prior study   was available for comparison.    This was a routine study. Transthoracic   echocardiography for ventricular function evaluation and assessment of   valvular function. Image quality was suboptimal. The study was technically   limited due to poor acoustic window availability and body habitus.   Intravenous contrast (Definity) was administered to opacify the LV.     ECG rhythm:   Atrial fibrillation     ----------------------------------------------------------------------------     Conclusions:     1. Left ventricle: The cavity size was normal. Wall thickness was mildly      increased. Systolic function was reduced. The estimated ejection fraction      was 35-40%, by visual assessment. Unable to assess LV diastolic function      due to heart rhythm.   2. Right ventricle: Systolic function was reduced.   3. Ventricular  septum: These changes may be related to a left bundle branch      block.   4. Left atrium: The left atrial volume was moderately increased.   5. Aortic root: The aortic root was 3.9cm diameter.   6. Ascending aorta: The ascending aorta was 4.2cm diameter.   7. Mitral valve: There was mild to moderate regurgitation.   8. Pericardium, extracardiac: There was no pericardial effusion.   Impressions:  No previous study was available for comparison.   *     ----------------------------------------------------------------------------   *   Findings:   Left ventricle:  The cavity size was normal. Wall thickness was mildly   increased. Systolic function was reduced. The estimated ejection fraction   was 35-40%, by visual assessment. There was diffuse hypokinesis with   regional variations. Unable to assess LV diastolic function due to heart   rhythm.   Ventricular septum:   These changes may be related to a left bundle branch   block.   Left atrium:  The left atrial volume was moderately increased.   Right ventricle:  The cavity size was normal. Systolic function was reduced.   Right atrium:  The atrium was normal in size.   Mitral valve:  The valve was structurally normal. Leaflet separation was   normal.  Doppler:  Transvalvular velocity was within the normal range. There   was no evidence for stenosis. There was mild to moderate regurgitation.   Aortic valve:   The valve was trileaflet. The leaflets were mildly   calcified. Cusp separation was normal.  Doppler:  Transvalvular velocity was   within the normal range. There was no evidence for stenosis. There was   trivial regurgitation.   Tricuspid valve:  The valve is structurally normal. Leaflet separation was   normal.  Doppler:  Transvalvular velocity was within the normal range. There   was no evidence for stenosis. There was trivial regurgitation.   Pulmonic valve:   The valve is structurally normal. Cusp separation was   normal.  Doppler:  Transvalvular velocity was  within the normal range. There   was no evidence for stenosis. There was no significant regurgitation.   Pericardium:   There was no pericardial effusion.   Aorta:   Aortic root: The aortic root was 3.9cm diameter.   Ascending aorta: The ascending aorta was 4.2cm diameter.   Pulmonary arteries:   Systolic pressure could not be accurately estimated.         Allergies:  Allergies[2]    Medications:  Current Hospital Medications[3]    Assessment:   EKG shows NSR 2/LBBB rate 97 no ischemic changes  Trop 40 pBNP 8629   K 4.1 creat 1.25  PT 16 INR 1.27   WBC 6.6  HH 16/43.7    plts 135   /115 HR 86 RR 23 O2 sats 95% RA  Echo 4/29/24 EF 35-40% Mild to mod MR   Given lasix 40 mg IV in ed  Ntg gtt @ 20 mcg/min IV bp now 150/110 pt to transfer to CT      Plan:  Trend trops obtain EKG if indicated likely demand  Echo in am  Cbc cmp mag in am   Strict I/O and daily weights   Orders in sign and hold  - Continue to monitor overnight  - Formal Cardiology consult to follow in AM.       Yaneli Salazar NP  [facility]  5/5/2025  11:42 PM         [1]   Past Medical History:   1St degree AV block    Arrhythmia    afib    Essential hypertension    High blood pressure    LBBB (left bundle branch block)    Osteoarthritis    Sleep apnea    cpap   [2]   Allergies  Allergen Reactions    Lisinopril Coughing   [3]   nitroGLYCERIN in dextrose 5%

## 2025-05-06 NOTE — PROGRESS NOTES
05/06/25 0118 05/06/25 0120 05/06/25 0122   Vital Signs   Pulse 84 84 91   Heart Rate Source Monitor Monitor Monitor   Resp 16 16 16   Respiratory Quality Normal Normal Normal   BP (!) 128/97 129/84 (!) 128/96   MAP (mmHg) (!) 107 98 (!) 106   BP Location Left arm Left arm Left arm   BP Method Automatic Automatic Automatic   Patient Position Lying Sitting Standing      Admitting orthostatic bp check

## 2025-05-06 NOTE — PROGRESS NOTES
Pt seen and examined.  Cont diuresis w/ IV lasix  Nitrog gtt dc'ed. Still some ALDANA.    Abhishek Isbell MD

## 2025-05-06 NOTE — PLAN OF CARE
NURSING ADMISSION NOTE      Patient admitted via Cart  Oriented to room.  Safety precautions initiated.  Bed in low position.  Call light in reach.    Pt received from ER axox4. Pt arrives on nitroglycerin gtt at 20 mcg/min. Pt denies chest pain. States he is just tired and nauseated from all the meds and requesting something to eat. Food given. Admission navigator completed. Med rec completed. Assessment completed see flowsheet.  2 person skin check completed with [employer] pct. Pt updated on poc for the night and am. Call light and personal items within reach.

## 2025-05-06 NOTE — ED QUICK NOTES
Orders for admission, patient is aware of plan and ready to go upstairs. Any questions, please call ED RN Yvonne at extension [phone].     Patient Covid vaccination status: Fully vaccinated     COVID Test Ordered in ED: None    COVID Suspicion at Admission: N/A    Running Infusions: Medication Infusions[1]     Mental Status/LOC at time of transport: AOx4    Other pertinent information:   CIWA score: N/A   NIH score:  N/A             [1]    nitroGLYCERIN in dextrose 5% 20 mcg/min (05/05/25 8640)

## 2025-05-07 LAB
ANION GAP SERPL CALC-SCNC: 13 MMOL/L (ref 0–18)
BASOPHILS # BLD AUTO: 0.04 X10(3) UL (ref 0–0.2)
BASOPHILS NFR BLD AUTO: 0.9 %
BUN BLD-MCNC: 15 MG/DL (ref 9–23)
CALCIUM BLD-MCNC: 9.6 MG/DL (ref 8.7–10.6)
CHLORIDE SERPL-SCNC: 99 MMOL/L (ref 98–112)
CO2 SERPL-SCNC: 26 MMOL/L (ref 21–32)
CREAT BLD-MCNC: 1.15 MG/DL (ref 0.7–1.3)
EGFRCR SERPLBLD CKD-EPI 2021: 73 ML/MIN/1.73M2 (ref 60–?)
EOSINOPHIL # BLD AUTO: 0.16 X10(3) UL (ref 0–0.7)
EOSINOPHIL NFR BLD AUTO: 3.7 %
ERYTHROCYTE [DISTWIDTH] IN BLOOD BY AUTOMATED COUNT: 13.8 %
GLUCOSE BLD-MCNC: 122 MG/DL (ref 70–99)
GLUCOSE BLD-MCNC: 97 MG/DL (ref 70–99)
HCT VFR BLD AUTO: 42.3 % (ref 39–53)
HGB BLD-MCNC: 15.7 G/DL (ref 13–17.5)
IMM GRANULOCYTES # BLD AUTO: 0.02 X10(3) UL (ref 0–1)
IMM GRANULOCYTES NFR BLD: 0.5 %
LYMPHOCYTES # BLD AUTO: 0.79 X10(3) UL (ref 1–4)
LYMPHOCYTES NFR BLD AUTO: 18.5 %
MAGNESIUM SERPL-MCNC: 1.9 MG/DL (ref 1.6–2.6)
MCH RBC QN AUTO: 34.9 PG (ref 26–34)
MCHC RBC AUTO-ENTMCNC: 37.1 G/DL (ref 31–37)
MCV RBC AUTO: 94 FL (ref 80–100)
MONOCYTES # BLD AUTO: 0.48 X10(3) UL (ref 0.1–1)
MONOCYTES NFR BLD AUTO: 11.2 %
NEUTROPHILS # BLD AUTO: 2.79 X10 (3) UL (ref 1.5–7.7)
NEUTROPHILS # BLD AUTO: 2.79 X10(3) UL (ref 1.5–7.7)
NEUTROPHILS NFR BLD AUTO: 65.2 %
OSMOLALITY SERPL CALC.SUM OF ELEC: 287 MOSM/KG (ref 275–295)
PLATELET # BLD AUTO: 108 10(3)UL (ref 150–450)
POTASSIUM SERPL-SCNC: 3.6 MMOL/L (ref 3.5–5.1)
RBC # BLD AUTO: 4.5 X10(6)UL (ref 4.3–5.7)
SODIUM SERPL-SCNC: 138 MMOL/L (ref 136–145)
WBC # BLD AUTO: 4.3 X10(3) UL (ref 4–11)

## 2025-05-07 PROCEDURE — 99232 SBSQ HOSP IP/OBS MODERATE 35: CPT | Performed by: HOSPITALIST

## 2025-05-07 RX ORDER — POTASSIUM CHLORIDE 1500 MG/1
40 TABLET, EXTENDED RELEASE ORAL ONCE
Status: COMPLETED | OUTPATIENT
Start: 2025-05-07 | End: 2025-05-07

## 2025-05-07 RX ORDER — FUROSEMIDE 10 MG/ML
80 INJECTION INTRAMUSCULAR; INTRAVENOUS
Status: DISCONTINUED | OUTPATIENT
Start: 2025-05-07 | End: 2025-05-08

## 2025-05-07 NOTE — PROGRESS NOTES
Heart Failure Nurse  Progress Note    Patient was evaluated by the Heart Failure Nurse  for understanding, verbalization, demonstration, and recall of education related to heart failure, overall adherence to the behaviors necessary to maintain a compensated status, and risk for readmission.     Patient assessment: Met with patient at [facility] for HF education. Patient states he has a history of heart failure. He takes Toprol, Entresto and spirolactone. He is aware of the importance of these medications with his diagnosis.     Patient is able to verbalize signs/symptoms fluid overload/impending HF exacerbation and who to contact with problems                                          _x__ yes  ___ no      Patient is following a 2000 mg sodium diet                                             _x__ yes  ___ no    If no, barriers to 2000 mg sodium diet:_____ he is trying. He has been monitoring sodium levels of food prior to admission.     Patient informed of 2-Part dietician classes that is free if sign up within 30 days of discharge or $40  _x__ yes  ___ no. Very interested      Patient is adherent to medication regimen                                              _x__ yes  ___ no    If no, barriers to medication regimen:    Patient has sufficient funds to purchase medication                      __x_ yes  ___ no      Patient has a scale in the home              __x_ yes  ___ no      Patient is adherent to daily weight monitoring                                        ___ yes   _x__ no    If no, barriers to daily weight monitoring: weights a few times a week- encourage daily monitoring    Symptom Tracker Worksheet reviewed with patient  _x__ yes   ___ no      Patient verbalizes understanding of stoplight/heart failure zones          _x__ yes   ___ no      Patient understands the importance of 7-day follow-up appointment      __x_ yes  ___ no    Appointment Date: 5/12/25 10 am THANH Canales          Patient has  adequate transportation to attend follow-up appointments    __x_ yes  ___ no    If no, was referral to Social Work made  ___yes  ___x no      Family/Friend present during education: none    Additional consultations required: none    Ronit David RN    [id number]

## 2025-05-07 NOTE — PROGRESS NOTES
[facility]   part of [facility]     Hospitalist Progress Note     Mitesh Flood Patient Status:  Inpatient    1965 MRN [id number]   Location [facility] 2NE-A Attending Abhishek Isbell MD   Hosp Day # 0 PCP Shara Vance MD     Chief Complaint: FOL    Subjective:     Patient abd still swollen    Objective:    Review of Systems:   A comprehensive review of systems was completed; pertinent positive and negatives stated in subjective.    Vital signs:  Temp:  [97.6 °F (36.4 °C)-98.4 °F (36.9 °C)] 98.1 °F (36.7 °C)  Pulse:  [66-79] 74  Resp:  [18-20] 18  BP: (110-147)/() 110/80  SpO2:  [91 %-95 %] 91 %    Physical Exam:    General: No acute distress  Respiratory: No wheezes, no rhonchi  Cardiovascular: S1, S2, regular rate and rhythm  Abdomen: Soft, Non-tender, distended, positive bowel sounds  Neuro: No new focal deficits.   Extremities: No edema      Diagnostic Data:    Labs:  Recent Labs   Lab 25  23025  0623 25  0605   WBC 6.6  --  5.0 4.3   HGB 16.0  --  14.3 15.7   MCV 94.4  --  95.1 94.0   .0*  --  116.0* 108.0*   INR  --  1.27*  --   --        Recent Labs   Lab 25  0625  0629   * 132* 97   BUN 14 13 15   CREATSERUM 1.25 1.39* 1.15   CA 9.6 9.2 9.6   ALB 4.4 4.2  --     138 138   K 4.1 3.8 3.6    100 99   CO2 28.0 30.0 26.0   ALKPHO 76 70  --    AST 39* 30  --    ALT 30 26  --    BILT 1.9* 1.9*  --    TP 7.3 6.9  --        Estimated Glomerular Filtration Rate: 73 mL/min/1.73m2 (result from lab).    Recent Labs   Lab 25  1219 25  1817   TROPHS 33 28 23       Recent Labs   Lab 25  2301   PTP 16.0*   INR 1.27*                  Microbiology    No results found for this visit on 25.      Imaging: Reviewed in Epic.    Medications:    furosemide  80 mg Intravenous BID (Diuretic)    apixaban  5 mg Oral BID    metoprolol succinate ER  100 mg Oral 2x Daily(Beta  Blocker)    sacubitril-valsartan  1 tablet Oral BID    spironolactone  25 mg Oral Daily       Assessment & Plan:      # Acute on chronic systolic heart failure  - Continue IV lasix, cont diuresis  - will continue b-blocker, entresto, spironolactone  - cardiology on consult  - Patient just had a echo done on 2 May 2025 so will defer to cardiology if they need to repeat an echo.    #HTNsive urgency  -nitrog gtt weaned off    #Morbid obesity  -bmi 40     # Atrial fibrillation  - Will continue on b-blocker for rate control.  - Will continue on Eliquis for anticoagulation.     # LENARD  - Will continue on BiPAP.    Possible dc 1-2 d    Abhishek Isbell MD    Supplementary Documentation:     Quality:  DVT Mechanical Prophylaxis:        DVT Pharmacologic Prophylaxis   Medication    apixaban (Eliquis) tab 5 mg                Code Status: Not on file  Hahn: External urinary catheter in place  Hahn Duration (in days):   Central line:    DESHAUN:     Discharge is dependent on: course  At this point Mr. Flood is expected to be discharge to: home    The 21st Century Cures Act makes medical notes like these available to patients in the interest of transparency. Please be advised this is a medical document. Medical documents are intended to carry relevant information, facts as evident, and the clinical opinion of the practitioner. The medical note is intended as peer to peer communication and may appear blunt or direct. It is written in medical language and may contain abbreviations or verbiage that are unfamiliar.

## 2025-05-07 NOTE — PLAN OF CARE
Assumed care of pt at 1930.  Patient alert and oriented x4. Belongings at the bedside.   O2 sats maintained on RA, Spo2 >90%, desats when sleeping to 90's, pulse ox and tele applied.  Denies shortness of breath, no cough present. Afebrile.   NSR w/ 1st degree heart block and BBB with HR in the 60's on tele at rest.  Denies any chest pain, dizziness, or discomfort at this time.   Continent of B&B. Last BM 4/6. Pt urinating without difficulty in urinal.   Activity level UAL.   All questions addressed & updated on plan of care. Bed locked and in lowest position. Call light within reach.     POC:   - IV lasix    Problem: Patient/Family Goals  Goal: Patient/Family Long Term Goal  Description: Patient's Long Term Goal: To stay healthy, out of the hospital  Interventions:  - Eat a heart healthy diet  - Exercise as tolerated  - Take all medications as prescribed  - Attend all follow up appointments  - See additional Care Plan goals for specific interventions  Outcome: Progressing  Goal: Patient/Family Short Term Goal  Description: Patient's Short Term Goal: To go home  Interventions:   - IV lasix  - Echo  - See additional Care Plan goals for specific interventions  Outcome: Progressing

## 2025-05-07 NOTE — PROGRESS NOTES
Cardiology Progress Note        Mitesh Flood Patient Status:  Observation    1965 MRN [id number]   [facility] 2NE-A Attending Abhishek Isbell MD   Hosp Day # 0 PCP Shara Vance MD     Subjective:  BP's remain on the higher end.  No cp or dyspnea.    Medications:  Scheduled Medications[1]    Continuous Infusions:  Medication Infusions[2]      Allergies:  Allergies[3]      Intake/Output:    Intake/Output Summary (Last 24 hours) at 2025 0747  Last data filed at 2025 0444  Gross per 24 hour   Intake 1200 ml   Output 3000 ml   Net -1800 ml           Last 3 Weights   25 0630 (!) 315 lb 7.7 oz (143.1 kg)   25 0122 (!) 319 lb 14.2 oz (145.1 kg)   25 2228 (!) 310 lb (140.6 kg)   10/07/24 0554 (!) 316 lb (143.3 kg)   10/02/24 1013 (!) 310 lb (140.6 kg)   24 1012 (!) 316 lb 12.8 oz (143.7 kg)            Physical Exam:    Temp:  [97.6 °F (36.4 °C)-98.4 °F (36.9 °C)] 98 °F (36.7 °C)  Pulse:  [66-80] 79  Resp:  [18-20] 18  BP: (122-149)/() 142/97  SpO2:  [91 %-95 %] 91 %    General: No apparent distress  HEENT: No focal deficits.  Neck: supple. JVP normal  Cardiac: Regular rhythm, S1, S2 normal,  rub or gallop.  No murmur.  Lungs: CTA  Abdomen: Soft, non-tender.   Extremities: No edema  Neurologic: no focal deficits  Skin: Warm and dry.     Telemetry: sinus    EKG:      Echo:      Cardiac Cath:      Labs:  HEM:  Recent Labs   Lab 25  06   WBC 6.6 5.0 4.3   HGB 16.0 14.3 15.7   .0* 116.0* 108.0*       Chem:  Recent Labs   Lab 05/05/25  2208 05/06/25  0623 05/07/25  0629    138 138   K 4.1 3.8 3.6    100 99   CO2 28.0 30.0 26.0   BUN 14 13 15   CREATSERUM 1.25 1.39* 1.15   CA 9.6 9.2 9.6   MG  --  1.7 1.9   * 132* 97       Recent Labs   Lab 25  0623   ALT 30 26   AST 39* 30   ALB 4.4 4.2       No results for input(s): \"TROP\", \"CK\" in the last 168 hours.    Recent Labs   Lab  05/05/25  2301   PTP 16.0*   INR 1.27*                 Impression:  Hypertensive urgency in setting of volume overload.  Acute on chronic HFrEF, EF 35-40% in the past.  EF 50% on echo 5/6/25.  LBBB, chronic  Morbid obesity  LENARD, fair compliance          Plan:  Increase IV lasix to 80 mg BID.  Same cardiac meds for now and follow BP with diuresis.  Hopefully tuned up and home tomorrow with close o/p f/u.        Yousuf Devlin MD  5/7/2025  7:47 AM  L3         [1]    furosemide  80 mg Intravenous BID (Diuretic)    apixaban  5 mg Oral BID    metoprolol succinate ER  100 mg Oral 2x Daily(Beta Blocker)    sacubitril-valsartan  1 tablet Oral BID    spironolactone  25 mg Oral Daily   [2]    nitroGLYCERIN in dextrose 5% Stopped (05/06/25 0045)   [3]   Allergies  Allergen Reactions    Lisinopril Coughing

## 2025-05-07 NOTE — PLAN OF CARE
Received patient at 0730. Alert and oriented x4. Tele rhythm NSR. O2 sat >90% on RA. Lung sounds clear. Bed is locked and in low position. Call light and personal items within patient reach. No c/o chest pain or shortness of breath. Pt voiding with no issues. Pt ambulating in room SBA.     POC:   IV lasix BID  DW, I&Os    Reviewed plan of care and patient verbalized understanding.     Problem: Patient/Family Goals  Goal: Patient/Family Long Term Goal  Description: Patient's Long Term Goal: To stay healthy, out of the hospital  Interventions:  - Eat a heart healthy diet  - Exercise as tolerated  - Take all medications as prescribed  - Attend all follow up appointments  - See additional Care Plan goals for specific interventions  Outcome: Progressing  Goal: Patient/Family Short Term Goal  Description: Patient's Short Term Goal: To go home  Interventions:   - IV lasix  - Echo  - See additional Care Plan goals for specific interventions  Outcome: Progressing

## 2025-05-07 NOTE — PAYOR COMM NOTE
ADMISSION REVIEW     Payor: [facility]  Subscriber #:  [id number]  Authorization Number: [id number]    Admit date: 5/7/25  Admit time:  1:38 PM       REVIEW DOCUMENTATION:     ED Provider Notes        ED Provider Notes signed by Barrie Jimenez DO at 5/6/2025 12:05 AM        Chief Complaint   Patient presents with    Chest Pain Angina     Stated Complaint: chest pain, SHAYLA with exertion,    Subjective:   HPI    59-year-old male history of hypertension, paroxysmal atrial fibrillation on Eliquis, presents emergency room for evaluation of shortness of breath.  States the last few days has been feeling short of breath with exertion, symptoms are worsening today therefore he came to the emergency room.  Denies any chest pain.  Denies fevers or chills.  Denies lower extremity swelling or calf pain denies PND orthopnea.  Patient reports he recently did have echo performed, on review of medical records on 5/2/2025 echo EF of 35%.  History of Present Illness               Objective:     Past Medical History:    1St degree AV block    Arrhythmia    afib    Essential hypertension    High blood pressure    LBBB (left bundle branch block)    Osteoarthritis    Sleep apnea    cpap       Past Surgical History:   Procedure Laterality Date    Wrist fracture surgery Left        ED Triage Vitals   BP 05/05/25 2215 (!) 164/109   Pulse 05/05/25 2215 91   Resp 05/05/25 2215 20   Temp 05/05/25 2312 97.7 °F (36.5 °C)   Temp src 05/05/25 2312 Temporal   SpO2 05/05/25 2215 94 %   O2 Device 05/05/25 2315 None (Room air)       Current Vitals:   Vital Signs  BP: (!) 170/115  Pulse: 86  Resp: 23  Temp: 97.7 °F (36.5 °C)  Temp src: Temporal  MAP (mmHg): (!) 132    Oxygen Therapy  SpO2: 95 %  O2 Device: None (Room air)        Physical Exam  GENERAL: Patient is awake, alert  HEENT: no scleral icterus.  Mucous membranes are moist, oropharynx is clear  HEART: Regular rate and rhythm, no murmurs.  LUNGS: Bibasilar rales.  No wheezing  ABDOMEN: Soft,  nondistended,non tender  EXTREMITIES: No peripheral edema, no calf tenderness  Physical Exam            Labs Reviewed   COMP METABOLIC PANEL (14) - Abnormal; Notable for the following components:       Result Value    Glucose 137 (*)     AST 39 (*)     Bilirubin, Total 1.9 (*)     All other components within normal limits   CBC WITH DIFFERENTIAL WITH PLATELET - Abnormal; Notable for the following components:    .0 (*)     MCH 34.6 (*)     Lymphocyte Absolute 0.45 (*)     All other components within normal limits   PRO BETA NATRIURETIC PEPTIDE - Abnormal; Notable for the following components:    Pro-Beta Natriuretic Peptide 8,629 (*)     All other components within normal limits   PROTHROMBIN TIME (PT) - Abnormal; Notable for the following components:    PT 16.0 (*)     INR 1.27 (*)     All other components within normal limits   TROPONIN I HIGH SENSITIVITY - Normal   PTT, ACTIVATED - Normal     EKG    Rate, intervals and axes as noted on EKG Report.  Rate: 97  Rhythm: Sinus Rhythm  Reading: Sinus rhythm with left bundle branch block.  No change compared to EKG dated May 2, 2024  A total of 35 minutes of critical care time (exclusive of billable procedures) was administered to manage the patient's cardiovascular instability due to his acute pulmonary edema.  This involved direct patient intervention, complex decision making, and/or extensive discussions with the patient, family, and clinical staff.      Differential diagnosis before testing includes but not limited to pulm edema, pneumonia, pneumothorax, acute coronary syndrome, which is a medical condition that poses a threat to life/function    Past Medical History/comorbidities-as noted in HPI      Radiographic images  I personally reviewed the radiographs and my individual interpretation shows chest x-ray pulmonary edema  I also reviewed the official reports that showed findings can be seen with edema correlate for CHF.        Discussion of management  (consult/physicians, social work, pharmacy,ect) [facility] cardiology [facility]    External chart review included recent echo EF 35% as in HPI    Medications Provided: Sublingual nitroglycerin, IV nitroglycerin, IV Lasix, hydralazine    Course of Events during Emergency Room Visit include patient placed on cardiac monitor and pulse ox.  Exam consistent with acute pulmonary edema, was given nitroglycerin, Lasix.  Patient reports she is feeling better, CBC and chemistry were unremarkable.  Troponin 35.  BNP 8629.  Discussed with hospitalist and cardiology, will admit for further evaluation treatment.  Discussed all results with the patient and wife they agree with plan  Shared decision making was utilized       Disposition:    Admission  I have discussed with the patient the results of test, differential diagnosis, and treatment plan. They expressed clear understanding of these instructions and agrees to the plan provided.       Note to patient: The 21st Century Cures Act makes medical notes like these available to patients in the interest of transparency. However, this is a medical document intended as peer to peer communication. It is written in medical language and may contain abbreviations or verbiage that are unfamiliar. It may appear blunt or direct. Medical documents are intended to carry relevant information, facts as evident, and the clinical opinion of the practitioner.        Disposition and Plan     Clinical Impression:  1. Acute pulmonary edema (HCC)         Disposition:  Admit       Present on Admission  Date Reviewed: 10/7/2024          ICD-10-CM Noted POA    * (Principal) Acute pulmonary edema (HCC) J81.0 5/5/2025 Unknown            5/6    HOSPITALIST:     History and Physical     Chief Complaint: SOB     Subjective:  History of Present Illness:      Mitesh Flood is a 59 year old male with history of atrial fibrillation and chronic systolic heart failure presents  emergency room with shortness of breath.  Started a few days ago has been getting progressively worse mostly with exertion but today he started having some shortness of breath at rest.  No chest pain, palpitations.  No fevers, chills, nausea, vomiting, diarrhea.  No swelling in the lower extremities no orthopnea or PND.     Objective:  Physical Exam:    /88 (BP Location: Left arm)   Pulse 82   Temp 98.2 °F (36.8 °C) (Oral)   Resp 16   Wt (!) 319 lb 14.2 oz (145.1 kg)   SpO2 91%   BMI 39.98 kg/m²   General: No acute distress, Alert  Respiratory: No rhonchi, no wheezes  Cardiovascular: S1, S2. Regular rate and rhythm  Abdomen: Soft, Non-tender, non-distended, positive bowel sounds  Neuro: No new focal deficits  Extremities: No edema              Recent Labs   Lab 05/05/25  2208   *   BUN 14   CREATSERUM 1.25   EGFRCR 66   CA 9.6   ALB 4.4      K 4.1      CO2 28.0   ALKPHO 76   AST 39*   ALT 30   BILT 1.9*   TP 7.3           Recent Labs   Lab 05/05/25  2208   PBNP 8,629*      Assessment & Plan:  # Acute on chronic systolic heart failure  - Continue IV lasix  - will continue b-blocker, entresto, spironolactone  - cardiology on consult  - Patient just had a echo done on 2 May 2025 so will defer to cardiology if they need to repeat an echo.     # Atrial fibrillation  - Will continue on b-blocker for rate control.  - Will continue on Eliquis for anticoagulation.     # LENARD  - Will continue on BiPAP.       CARDIOLOGY:     Cardiology Consultation     Reason for Consultation:  CHF, HTN urgency        History of Present Illness:  Mitesh Flood is a a(n) 59 year old male. Very nice morbidly obese tabitha, follows with Dr. Saul.  H/o PAF, tachy mediated cardiomyopathy, EF 35-40%.  AKILAH/DCC May 2024.  HTN, LENARD, not terribly compliant with CPAP.  Clean cath 4/30/24.  He has been feeling more fatigued the past week.  The past 36 hours he has had had progressive SERVIN, much worse yesterday.  He came  to the ER.  /110.  CXR with CHF, pBNP elevated.  Given IV lasix, hydralazine, IV NTG and admitted.  Feels better this morning.  Has been in NSR.  Impression:   Hypertensive urgency in setting of volume overload.  Acute on chronic HFrEF, EF 35-40%.  LBBB, chronic  Morbid obesity  LENARD, fair compliance     Plan:  Stop IV NTG  Echo with doppler.  Same entresto, chanda, toprol.  Replace potassium.  Continue lasix 40 mg IVP BID.  Daily BMP's.         HOSPITALIST:        Abhishek Isbell MD   Physician  Hospitalist     Progress Notes     Signed     Date of Service: 5/6/2025  1:45 PM     Signed         Pt seen and examined.  Cont diuresis w/ IV lasix  Nitrog gtt dc'ed. Still some ALDANA.     Abhishek Isbell MD                       5/7    CARDIOLOGY:         Subjective:  BP's remain on the higher end.         Physical Exam:     Temp:  [97.6 °F (36.4 °C)-98.4 °F (36.9 °C)] 98 °F (36.7 °C)  Pulse:  [66-80] 79  Resp:  [18-20] 18  BP: (122-149)/() 142/97  SpO2:  [91 %-95 %] 91 %            Impression:  Hypertensive urgency in setting of volume overload.  Acute on chronic HFrEF, EF 35-40% in the past.  EF 50% on echo 5/6/25.  LBBB, chronic  Morbid obesity  LENARD, fair compliance              Plan:  Increase IV lasix to 80 mg BID.  Same cardiac meds for now and follow BP with diuresis.  Hopefully tuned up and home tomorrow with close o/p f/u.         HOSPITALIST:       Chief Complaint: FOL     Subjective:  Patient abd still swollen      Vital signs:  Temp:  [97.6 °F (36.4 °C)-98.4 °F (36.9 °C)] 98.1 °F (36.7 °C)  Pulse:  [66-79] 74  Resp:  [18-20] 18  BP: (110-147)/() 110/80  SpO2:  [91 %-95 %] 91 %             Recent Labs   Lab 05/05/25  2208 05/05/25  2301 05/06/25  0623 05/07/25  0605   WBC 6.6  --  5.0 4.3   HGB 16.0  --  14.3 15.7   MCV 94.4  --  95.1 94.0   .0*  --  116.0* 108.0*   INR  --  1.27*  --   --                Recent Labs   Lab 05/05/25  2208 05/06/25  0623 05/07/25  0629   * 132* 97   BUN 14  13 15   CREATSERUM 1.25 1.39* 1.15   CA 9.6 9.2 9.6   ALB 4.4 4.2  --     138 138   K 4.1 3.8 3.6    100 99   CO2 28.0 30.0 26.0   ALKPHO 76 70  --    AST 39* 30  --    ALT 30 26  --    BILT 1.9* 1.9*  --    TP 7.3 6.9  --    Assessment & Plan:  # Acute on chronic systolic heart failure  - Continue IV lasix, cont diuresis  - will continue b-blocker, entresto, spironolactone  - cardiology on consult  - Patient just had a echo done on 2 May 2025 so will defer to cardiology if they need to repeat an echo.     #HTNsive urgency  -nitrog gtt weaned off     #Morbid obesity  -bmi 40     # Atrial fibrillation  - Will continue on b-blocker for rate control.  - Will continue on Eliquis for anticoagulation.     # LENARD  - Will continue on BiPAP.     Possible dc 1-2 d     Abhishek Isbell MD        MEDICATIONS ADMINISTERED IN LAST 1 DAY:  apixaban (Eliquis) tab 5 mg       Date Action Dose Route User    5/7/2025 0854 Given 5 mg Oral Autumn Paulino RN    5/6/2025 2131 Given 5 mg Oral Rama Almeida RN          furosemide (Lasix) 10 mg/mL injection 80 mg       Date Action Dose Route User    5/7/2025 0854 Given 80 mg Intravenous Autumn Paulino RN          metoprolol succinate ER (Toprol XL) 24 hr tab 100 mg       Date Action Dose Route User    5/7/2025 0616 Given 100 mg Oral Rama Almeida RN    5/6/2025 1747 Given 100 mg Oral Mendel Valdes RN          potassium chloride (Klor-Con M20) tab 40 mEq       Date Action Dose Route User    5/7/2025 0854 Given 40 mEq Oral Autumn Paulino RN          sacubitril-valsartan (Entresto) 24-26 MG per tab 1 tablet       Date Action Dose Route User    5/7/2025 0854 Given 1 tablet Oral Autumn Paulino RN    5/6/2025 2131 Given 1 tablet Oral Rama Almeida RN          spironolactone (Aldactone) tab 25 mg       Date Action Dose Route User    5/7/2025 0854 Given 25 mg Oral Autumn Paulino, RN            furosemide (Lasix) 10 mg/mL injection 40 mg  Dose: 40 mg  Freq: Once Route: IV  Start: 05/05/25 2221  End: 05/05/25 2233 2233 MT-Given                                          hydrALAzine (Apresoline) 20 mg/mL injection 10 mg  Dose: 10 mg  Freq: Once Route: IV  Start: 05/05/25 2354 End: 05/05/25 2354   Admin Instructions:   Titrate to maintain systolic BP within 140 & 160 , and diastolic within 80 & 90.           2354 MT-Given               nitroglycerin (Nitrostat) SL tab 0.4 mg  Dose: 0.4 mg  Freq: Once Route: SL  Start: 05/05/25 2224 End: 05/05/25 2233   Admin Instructions:   Do not crush           2233 MT-Given                 Medications 04/28 04/29 04/30 05/01 05/02 05/03 05/04 05/05 05/06 05/07   nitroGLYCERIN in dextrose 5% 50 mg/250mL infusion premix  Rate: 1.5-6 mL/hr Dose: 5-20 mcg/min  Freq: Titrated Route: IV  Last Dose: Stopped (05/06/25 1155)  Start: 05/05/25 2224   Admin Instructions:   prn chest symptoms and SBP greater than 100 mmHg.   Order specific questions:              2255 MT-New Bag     2328 MT-New Bag [C]      0059 MT-Handoff     0920 JH-New Bag     1155 JH-Stopped           Vitals (last day)       Date/Time Temp Pulse Resp BP SpO2 Weight O2 Device O2 Flow Rate (L/min) [facility]    05/07/25 1220 98.1 °F (36.7 °C) 74 18 110/80 91 % -- None (Room air) 0 L/min NS    05/07/25 0833 98.1 °F (36.7 °C) 70 20 123/86 91 % -- None (Room air) 0 L/min NS    05/07/25 0630 -- 79 -- -- 91 % 315 lb 7.7 oz (143.1 kg) -- -- EJ    05/07/25 0444 98 °F (36.7 °C) -- 18 -- -- -- Nasal cannula 2 L/min EJ    05/07/25 0444 -- 69 -- 142/97 95 % -- -- -- TR    05/07/25 0235 -- -- -- -- 94 % -- Nasal cannula 2 L/min RH    05/06/25 2340 98.1 °F (36.7 °C) 66 18 147/101 92 % -- None (Room air) -- EJ    05/06/25 1940 98.4 °F (36.9 °C) 73 18 138/97 93 % -- -- -- EJ    05/06/25 1620 97.6 °F (36.4 °C) 73 18 133/101 92 % -- None (Room air) 0 L/min NS    05/06/25 1120 98.4 °F (36.9 °C) 73 20 149/100 93 % -- None (Room air) 0 L/min NS    05/06/25 0815 98.4 °F (36.9 °C) 80 20 122/77 92 % -- None (Room air) 0 L/min NS     05/06/25 0300 -- 82 -- 139/88 91 % -- None (Room air) --     05/06/25 0122 -- -- -- -- -- -- None (Room air) --     05/06/25 0122 -- 91 16 128/96 93 % 319 lb 14.2 oz (145.1 kg) -- -- PM    05/06/25 0120 -- 84 16 129/84 91 % -- -- -- PM    05/06/25 0118 98.2 °F (36.8 °C) 84 16 128/97 92 % -- -- -- PM    05/06/25 0045 -- 88 11 125/86 96 % -- -- -- MT    05/06/25 0030 -- 88 21 142/93 94 % -- -- -- MT    05/06/25 0020 -- 87 20 144/103 98 % -- -- -- MT    05/06/25 0015 -- 84 20 151/101 93 % -- -- -- MT       05/05/25 2345 -- 82 23 147/118 Abnormal  96 % -- -- -- MT   05/05/25 2315 -- 86 23 170/115 Abnormal  95 % -- None (Room air) -- MT   05/05/25 2312 97.7 °F (36.5 °C) -- -- -- -- -- -- -- MT   05/05/25 2245 -- 96 19 170/131 Abnormal  96 % -- -- -- MT   05/05/25 2230 -- 90 21 159/115 Abnormal  94 % -- -- -- MT   05/05/25 2228 -- -- -- -- -- 310 lb (140.6 kg) Abnormal  -- -- MT   Mitesh Flood #[id number] (CSN: [id number]) (59 year old M) (Adm: 05/05/25)  [id number]  Vital Sign Min/Max (last 24 hours)    Value Min Max   Temp 97.6 °F (36.4 °C) 98.4 °F (36.9 °C)   Pulse 66 79   Resp 18 20   BP: Systolic 110 147   BP: Diastolic 80 101 Abnormal    SpO2 91 % 95 %       Vitals (since admission)    Date/Time Temp Pulse Resp BP SpO2 Weight O2 Device O2 Flow Rate (L/min) [facility]   05/07/25 1220 98.1 °F (36.7 °C) 74 18 110/80 91 % -- None (Room air) 0 L/min NS   05/07/25 0833 98.1 °F (36.7 °C) 70 20 123/86 91 % -- None (Room air) 0 L/min NS   05/07/25 0630 -- 79 -- -- 91 % 315 lb 7.7 oz (143.1 kg) Abnormal  -- -- EJ   05/07/25 0444 98 °F (36.7 °C) -- 18 -- -- -- Nasal cannula 2 L/min EJ   05/07/25 0444 -- 69 -- 142/97 Abnormal  95 % -- -- -- TR   05/07/25 0235 -- -- -- -- 94 % -- Nasal cannula 2 L/min RH   05/06/25 2340 98.1 °F (36.7 °C) 66 18 147/101 Abnormal  92 % -- None (Room air) -- EJ   05/06/25 1940 98.4 °F (36.9 °C) 73 18 138/97 Abnormal  93 % -- -- -- EJ   05/06/25 1620 97.6 °F (36.4 °C) 73 18 133/101  Abnormal  92 % -- None (Room air) 0 L/min NS   05/06/25 1120 98.4 °F (36.9 °C) 73 20 149/100 Abnormal  93 % -- None (Room air) 0 L/min NS   05/06/25 0815 98.4 °F (36.9 °C) 80 20 122/77 92 % -- None (Room air) 0 L/min NS   05/06/25 0300 -- 82 -- 139/88 91 % -- None (Room air) -- ES   05/06/25 0122 -- -- -- -- -- -- None (Room air) -- ES   05/06/25 0122 -- 91 16 128/96 Abnormal  93 % 319 lb 14.2 oz (145.1 kg) Abnormal  -- -- PM   05/06/25 0120 -- 84 16 129/84 91 % -- -- -- PM   05/06/25 0118 98.2 °F (36.8 °C) 84 16 128/97 Abnormal  92 % -- -- -- PM   05/06/25 0045 -- 88 11 125/86 96 % -- -- -- MT   05/06/25 0030 -- 88 21 142/93 Abnormal  94 % -- -- -- MT   05/06/25 0020 -- 87 20 144/103 Abnormal  98 % -- -- -- MT   05/06/25 0015 -- 84 20 151/101 Abnormal  93 % -- -- -- MT   05/05/25 2345 -- 82 23 147/118 Abnormal  96 % -- -- -- MT   05/05/25 2315 -- 86 23 170/115 Abnormal  95 % -- None (Room air) -- MT   05/05/25 2312 97.7 °F (36.5 °C) -- -- -- -- -- -- -- MT   05/05/25 2245 -- 96 19 170/131 Abnormal  96 % -- -- -- MT   05/05/25 2230 -- 90 21 159/115 Abnormal  94 % -- -- -- MT   05/05/25 2228 -- -- -- -- -- 310 lb (140.6 kg) Abnormal  -- -- MT

## 2025-05-07 NOTE — DISCHARGE INSTRUCTIONS
Going Home Instructions  In this section you will find the tools which will guide you through the first few days after you leave the hospital. Continued use of these tools will help you develop the skills necessary to keep your heart failure under control.     Home Care Instructions Following Heart Failure - the most important things to do every day include:   Weigh yourself and review the “Self-Check Plan” sheet every morning.   Call your cardiologist office if you are in the “Pay Attention-Use Caution” (yellow zone) or “Medical Alert-Warning!” (red zone) as outlined in the Self-Check Plan sheet.  Take your medicines as prescribed.  Limit your sodium (salt) intake.  Know when to call your cardiologist, primary doctor, or nurse.  Know when to seek emergency care.      Things for You to Remember:   1. See your doctor or healthcare provider as written on your discharge instructions.  It is important that you attend this appointment to make sure your symptoms are under control.     2. Your recommended sodium intake is 4729-4832 mg daily.    3.  Weigh yourself every day.    4. Some exercise and activity is important to help keep your heart functioning and strong. Unless instructed not to exercise, you may walk at a slow to moderate pace for 10-15 minutes 2-3 days per week to start. Pace your activity to prevent shortness of breath or fatigue. Stop exercising if you develop chest pain, lightheadedness, or significant shortness of breath.       Call Your Cardiologist If:   You gain 2-3 pounds in one day or 5 pounds in one week.  You have more difficulty breathing.  You are getting more tired with normal activity.  You are more short of breath lying down, or awaken at night short of breath.  You have swelling of your feet or legs.  You urinate less often during the day and more often at night.  You have cramps in your legs.  You have blurred vision or see yellowish-green halos around objects of lights.    Go to the  Emergency Room If:   You have pain or tightness in your chest  You are extremely short of breath  You are coughing up pink-frothy mucus  You are traveling and develop symptoms of worsening heart failure      ** Please follow up with your cardiologist or Advanced Practice Provider as written on your discharge instructions. If you are not provided with an appointment, let your nurse know so you can get an appointment**

## 2025-05-08 VITALS
TEMPERATURE: 98 F | DIASTOLIC BLOOD PRESSURE: 95 MMHG | OXYGEN SATURATION: 95 % | BODY MASS INDEX: 39 KG/M2 | WEIGHT: 313.5 LBS | SYSTOLIC BLOOD PRESSURE: 133 MMHG | HEART RATE: 66 BPM | RESPIRATION RATE: 18 BRPM

## 2025-05-08 LAB
ANION GAP SERPL CALC-SCNC: 5 MMOL/L (ref 0–18)
BUN BLD-MCNC: 20 MG/DL (ref 9–23)
CALCIUM BLD-MCNC: 9.4 MG/DL (ref 8.7–10.6)
CHLORIDE SERPL-SCNC: 100 MMOL/L (ref 98–112)
CO2 SERPL-SCNC: 29 MMOL/L (ref 21–32)
CREAT BLD-MCNC: 1.36 MG/DL (ref 0.7–1.3)
EGFRCR SERPLBLD CKD-EPI 2021: 60 ML/MIN/1.73M2 (ref 60–?)
GLUCOSE BLD-MCNC: 102 MG/DL (ref 70–99)
NT-PROBNP SERPL-MCNC: 3504 PG/ML (ref ?–125)
NT-PROBNP SERPL-MCNC: 6726 PG/ML (ref ?–125)
OSMOLALITY SERPL CALC.SUM OF ELEC: 281 MOSM/KG (ref 275–295)
POTASSIUM SERPL-SCNC: 3.7 MMOL/L (ref 3.5–5.1)
SODIUM SERPL-SCNC: 134 MMOL/L (ref 136–145)

## 2025-05-08 PROCEDURE — 99239 HOSP IP/OBS DSCHRG MGMT >30: CPT | Performed by: INTERNAL MEDICINE

## 2025-05-08 RX ORDER — TORSEMIDE 20 MG/1
20 TABLET ORAL DAILY
Status: DISCONTINUED | OUTPATIENT
Start: 2025-05-08 | End: 2025-05-08

## 2025-05-08 NOTE — DISCHARGE SUMMARY
[place] HOSPITALIST  DISCHARGE SUMMARY     Mitesh Flood Patient Status:  Inpatient    1965 MRN [id number]   Location [facility] 2NE-A Attending No att. providers found   Hosp Day # 1 PCP Shara Vance MD     Date of Admission: 2025  Date of Discharge:  2025     Discharge Disposition: Home or Self Care    Discharge Diagnosis:    # Acute on chronic systolic heart failure  #HTNsive urgency  -nitrog gtt weaned off     #Morbid obesity     # Atrial fibrillation  # LENARD    History of Present Illness: Per Dr Koroma   Mitesh Flood is a 59 year old male with history of atrial fibrillation and chronic systolic heart failure presents emergency room with shortness of breath.  Started a few days ago has been getting progressively worse mostly with exertion but today he started having some shortness of breath at rest.  No chest pain, palpitations.  No fevers, chills, nausea, vomiting, diarrhea.  No swelling in the lower extremities no orthopnea or PND.       Brief Synopsis: patient admitted and diuresed and has done remarkable. He will be dc home today with follow up.     Lace+ Score: 64  59-90 High Risk  29-58 Medium Risk  0-28   Low Risk  Patient was referred to the [facility].    TCM Follow-Up Recommendation:  LACE > 58: High Risk of readmission after discharge from the hospital.    Consultants:  Cardiology     Discharge Medication List:     Discharge Medications        CONTINUE taking these medications        Instructions Prescription details   apixaban 5 MG Tabs  Commonly known as: Eliquis      Take 1 tablet (5 mg total) by mouth 2 (two) times daily.   Quantity: 60 tablet  Refills: 6     Entresto 24-26 MG Tabs  Generic drug: sacubitril-valsartan      Take 1 tablet by mouth 2 (two) times daily.   Quantity: 60 tablet  Refills: 6     metoprolol succinate  MG Tb24  Commonly known as: Toprol XL      Take 1 tablet (100 mg total) by mouth 2x Daily(Beta Blocker).    Quantity: 60 tablet  Refills: 6     spironolactone 25 MG Tabs  Commonly known as: Aldactone      Take 1 tablet (25 mg total) by mouth daily.   Quantity: 30 tablet  Refills: 6     torsemide 20 MG Tabs  Commonly known as: Demadex      Take 1 tablet (20 mg total) by mouth daily.   Quantity: 30 tablet  Refills: 6              [place]    Follow-up appointment:   [facility] Vance MD  [address] [facility] [address]  [address]  [facility] [id number] [phone]    Schedule an appointment as soon as possible for a visit in 1 week(s)      [facility]  [address]  [facility] [address] [phone]  Schedule an appointment as soon as possible for a visit      Ally Kulkarni PA  [address]  [facility] [address]  [phone]    Go on 2025  10 :00 am    **please do not remove this appt- contact   [id number]**    Appointments for Next 30 Days 5/10/2025 - 2025      None            Vital signs:       Physical Exam:    General: No acute distress   Lungs: clear to auscultation  Cardiovascular: S1, S2  Abdomen: Soft      -----------------------------------------------------------------------------------------------  PATIENT DISCHARGE INSTRUCTIONS: See electronic chart    Maria De Jesus Broussard MD    Total time spent on discharge plannin minutes     The  Century Cures Act makes medical notes like these available to patients in the interest of transparency. Please be advised this is a medical document. Medical documents are intended to carry relevant information, facts as evident, and the clinical opinion of the practitioner. The medical note is intended as peer to peer communication and may appear blunt or direct. It is written in medical language and may contain abbreviations or verbiage that are unfamiliar.

## 2025-05-08 NOTE — PLAN OF CARE
Assumed care from nocturnal RN. Patient is A&O x4 and was pleasant and cooperative throughout the shift. Patient is able to ambulate independently. Telemetry monitor reads NSR with a 1st AV & bundle branch block and O2 is stable on room air. Skin is clean, dry, intact. Education was provided on plan of care, and patient verbalized understanding. Patient safety was maintained. Call light and belongings are in reach.     POC:  - Diuresis -> transition PO  - DW / Strict I&O's / 1.5L FR    1505: Patient has been cleared for discharge. Education provided on follow up appointment and new medications, patient verbalized understanding. Teach-back method used. All questions have been answered. Patient transported off unit via wheelchair.         Problem: Patient/Family Goals  Goal: Patient/Family Long Term Goal  Description: Patient's Long Term Goal: To stay healthy, out of the hospital  Interventions:  - Eat a heart healthy diet  - Exercise as tolerated  - Take all medications as prescribed  - Attend all follow up appointments  - See additional Care Plan goals for specific interventions  Outcome: Adequate for Discharge  Goal: Patient/Family Short Term Goal  Description: Patient's Short Term Goal: To go home  Interventions:   - IV lasix  - Echo  - See additional Care Plan goals for specific interventions  Outcome: Adequate for Discharge

## 2025-05-08 NOTE — PROGRESS NOTES
Progress Note  Mitesh Flood Patient Status:  Inpatient    1965 MRN [id number]   Location [facility] 2NE-A Attending Maria De Jesus Broussard MD   Hosp Day # 1 PCP Shara Vance MD     Subjective:  Feels great. No complaints  Eager to go home    Missed three days of lasix prior to hospitalization    Objective:  /83 (BP Location: Left arm)   Pulse 59   Temp 98.6 °F (37 °C) (Oral)   Resp 20   Wt (!) 313 lb 7.9 oz (142.2 kg)   SpO2 93%   BMI 39.18 kg/m²     Telemetry: Reviewed      Intake/Output:    Intake/Output Summary (Last 24 hours) at 2025  Last data filed at 2025  Gross per 24 hour   Intake 960 ml   Output 3000 ml   Net -2040 ml       Last 3 Weights   25 0450 (!) 313 lb 7.9 oz (142.2 kg)   25 0630 (!) 315 lb 7.7 oz (143.1 kg)   25 0122 (!) 319 lb 14.2 oz (145.1 kg)   25 2228 (!) 310 lb (140.6 kg)   10/07/24 0554 (!) 316 lb (143.3 kg)   10/02/24 1013 (!) 310 lb (140.6 kg)   24 1012 (!) 316 lb 12.8 oz (143.7 kg)       Labs:  Recent Labs   Lab 25  0623 25  0629 25  0449   * 97 102*   BUN 13 15 20   CREATSERUM 1.39* 1.15 1.36*   EGFRCR 58* 73 60   CA 9.2 9.6 9.4    138 134*   K 3.8 3.6 3.7    99 100   CO2 30.0 26.0 29.0     Recent Labs   Lab 258 25  0623 25  0605   RBC 4.63 4.10* 4.50   HGB 16.0 14.3 15.7   HCT 43.7 39.0 42.3   MCV 94.4 95.1 94.0   MCH 34.6* 34.9* 34.9*   MCHC 36.6 36.7 37.1*   RDW 13.6 13.6 13.8   NEPRELIM 5.46 4.01 2.79   WBC 6.6 5.0 4.3   .0* 116.0* 108.0*         Recent Labs   Lab 25  0623 25  1219 25  1817   TROPHS 33 28 23     Physical Exam:    Physical Exam  Vitals and nursing note reviewed.   Constitutional:       General: He is not in acute distress.     Appearance: Normal appearance. He is not ill-appearing or diaphoretic.   HENT:      Head: Normocephalic and atraumatic.   Eyes:      Extraocular Movements: Extraocular movements intact.    Cardiovascular:      Rate and Rhythm: Normal rate and regular rhythm.      Heart sounds: No murmur heard.     No friction rub. No gallop.   Pulmonary:      Effort: Pulmonary effort is normal. No respiratory distress.      Breath sounds: Normal breath sounds.   Abdominal:      General: There is no distension.      Palpations: Abdomen is soft.   Musculoskeletal:         General: No swelling. Normal range of motion.      Cervical back: Normal range of motion. No rigidity.   Skin:     General: Skin is warm and dry.      Capillary Refill: Capillary refill takes less than 2 seconds.      Coloration: Skin is not jaundiced.   Neurological:      General: No focal deficit present.      Mental Status: He is alert and oriented to person, place, and time.      Cranial Nerves: No cranial nerve deficit.   Psychiatric:         Mood and Affect: Mood normal.         Medications:  Scheduled Medications[1]  Medication Infusions[2]    Assessment/Plan:    Hypertensive urgency - resolved  Acute on chronic HFrEF (EF 35-40% in past, now 50%)  LBBB  Morbid obesity  LENARD  Afib - on AC    HF exacerbration probably in setting of missing diuretics for three days    On Metoprolol, entresto, spironolactone.   Neg 2.2 L yesterday  Cr 1.3 today    Switch to home dose diuretics.  Okay for discharge from cardiac standpoint  Follow up with OP cardiology      Cortney Soto MD  5/8/2025  9:01 AM         [1]    furosemide  80 mg Intravenous BID (Diuretic)    apixaban  5 mg Oral BID    metoprolol succinate ER  100 mg Oral 2x Daily(Beta Blocker)    sacubitril-valsartan  1 tablet Oral BID    spironolactone  25 mg Oral Daily   [2]    nitroGLYCERIN in dextrose 5% Stopped (05/06/25 7660)

## 2025-05-08 NOTE — PLAN OF CARE
Problem: Patient/Family Goals  Goal: Patient/Family Long Term Goal  Description: Patient's Long Term Goal: To stay healthy, out of the hospital  Interventions:  - Eat a heart healthy diet  - Exercise as tolerated  - Take all medications as prescribed  - Attend all follow up appointments  - See additional Care Plan goals for specific interventions  Outcome: Progressing  Goal: Patient/Family Short Term Goal  Description: Patient's Short Term Goal: To go home  Interventions:   - IV lasix  - Echo  - See additional Care Plan goals for specific interventions  Outcome: Progressing

## 2025-05-09 ENCOUNTER — PATIENT OUTREACH (OUTPATIENT)
Dept: CASE MANAGEMENT | Age: 60
End: 2025-05-09

## 2025-05-09 NOTE — PAYOR COMM NOTE
DISCHARGE REVIEW    [facility]  Subscriber #:  [id number]  Authorization Number: [id number]    Admit date: 5/7/25  Admit time:   1:38 PM  Discharge Date: 5/8/2025  3:05 PM     Admitting Physician: Carmita Sherman DO  Attending Physician:  No att. providers found  Primary Care Physician: Shara Vance MD       Discharge Summary Notes    No notes of this type exist for this encounter.         REVIEWER COMMENTS

## 2025-05-09 NOTE — PROGRESS NOTES
Hospital follow up.    TCC request.    Attempt #1:  Left message on voicemail for patient to call transitions specialist back to schedule follow up appointments. Provided Transitions specialist scheduling phone number [phone].

## 2025-05-12 NOTE — PROGRESS NOTES
TCC appointment request (discharged 05/08)    [facility]  [address] [place] [address]  [phone]    Attempt #2:  Left message on voicemail for patient to call transitions specialist back to schedule follow up appointments. Provided Transitions specialist scheduling phone number [phone].

## 2025-05-13 NOTE — PROGRESS NOTES
Hospital Follow up for TCC (Discharge 5/8 edw)       [facility]  [address] [place] [address] [phone]  Unable to contact pt after muiltple attempts     Attempt #3:  Left message on voicemail for patient to call transitions specialist back to schedule follow up appointments. Provided Transitions specialist scheduling phone number [phone]. Closing encounter. Will re-open if patient returns call.

## 2025-05-28 ENCOUNTER — TELEPHONE (OUTPATIENT)
Dept: CARDIOLOGY UNIT | Facility: HOSPITAL | Age: 60
End: 2025-05-28

## (undated) DIAGNOSIS — I10 ESSENTIAL HYPERTENSION: ICD-10-CM

## (undated) DEVICE — SCREW FIX 3.5X48MM HEX HD

## (undated) DEVICE — HANDPIECE SET WITH HIGH FLOW TIP AND SUCTION TUBE: Brand: INTERPULSE

## (undated) DEVICE — Device: Brand: STABLECUT®

## (undated) DEVICE — PIN DRL 75MM HDLSS TRCR NXGN

## (undated) DEVICE — GAMMEX® PI HYBRID SIZE 8, STERILE POWDER-FREE SURGICAL GLOVE, POLYISOPRENE AND NEOPRENE BLEND: Brand: GAMMEX

## (undated) DEVICE — T5 HOOD WITH PEEL AWAY FACE SHIELD

## (undated) DEVICE — SYRINGE MED 50ML LL TIP DISP

## (undated) DEVICE — GAMMEX® NON-LATEX PI ORTHO SIZE 8.5, STERILE POLYISOPRENE POWDER-FREE SURGICAL GLOVE: Brand: GAMMEX

## (undated) DEVICE — SHEET,DRAPE,53X77,STERILE: Brand: MEDLINE

## (undated) DEVICE — PACK CDS TOTAL KNEE

## (undated) DEVICE — 3M™ STERI-STRIP™ REINFORCED ADHESIVE SKIN CLOSURES, R1546, 1/4 IN X 4 IN (6 MM X 100 MM), 10 STRIPS/ENVELOPE: Brand: 3M™ STERI-STRIP™

## (undated) DEVICE — NEEDLE SPNL 18GA L3.5IN W/ QNCKE SHARPER BVL

## (undated) DEVICE — ISLAND DRESSING 4IN X 10IN: Brand: SILVERLON ANTIMICROBIAL WOUND DRESSING

## (undated) DEVICE — Device

## (undated) DEVICE — BANDAGE COMPR 6INX12FT SMTH FOR LIMB EXSANG

## (undated) DEVICE — SCREW ORTH 2.5X25MM FEM HEX PERSONA

## (undated) DEVICE — SOLUTION IRRIG 3000ML 0.9% NACL FLX CONT

## (undated) DEVICE — 2T11 #2 PDO 36 X 36: Brand: 2T11 #2 PDO 36 X 36

## (undated) DEVICE — SUT COAT VCRL 2-0 27IN ABSRB UD 26MM CT-2

## (undated) DEVICE — SUT MCRYL 3-0 18IN PS-2 ABSRB UD 19MM 3/8 CIR

## (undated) DEVICE — 3M™ COBAN™ NL STERILE NON-LATEX SELF-ADHERENT WRAP, 2084S, 4 IN X 5 YD (10 CM X 4,5 M), 18 ROLLS/CASE: Brand: 3M™ COBAN™

## (undated) DEVICE — APPLICATOR PREP 26ML CHG 2% ISO ALC 70%

## (undated) DEVICE — CONTAINER,SPECIMEN,OR STERILE,4OZ: Brand: MEDLINE

## (undated) DEVICE — SOLUTION IRRIG 1000ML 0.9% NACL USP BTL

## (undated) DEVICE — 35 ML SYRINGE LUER-LOCK TIP: Brand: MONOJECT

## (undated) DEVICE — ADHESIVE SKIN CLSR 0.7ML TOP DERMBND ADV

## (undated) DEVICE — 3 BONE CEMENT MIXER: Brand: MIXEVAC

## (undated) DEVICE — HOOD: Brand: FLYTE

## (undated) DEVICE — DISPOSABLE TOURNIQUET CUFF SINGLE BLADDER, DUAL PORT AND QUICK CONNECT CONNECTOR: Brand: COLOR CUFF

## (undated) NOTE — ED AVS SNAPSHOT
Rosenda Dillon   MRN: [id number]    Department:  [facility] Emergency Department   Date of Visit:  3/25/2018           Disclosure     Insurance plans vary and the physician(s) referred by the ER may not be covered by your plan.  Please contac tell this physician (or your personal doctor if your instructions are to return to your personal doctor) about any new or lasting problems. The primary care or specialist physician will see patients referred from the [facility] Emergency Department.  Francine Allison

## (undated) NOTE — LETTER
[address]  [address]  Authorization for Surgical Operation and Procedure     Date:___________                                                                                                         Time:__________  I hereby authorize Dr. Colón, my physician and his/her assistants (if applicable), which may include medical students, residents, and/or fellows, to perform the following surgical operation/ procedure and administer such anesthesia as may be determined necessary by my physician:  Operation/Procedure name Transesophageal Echocardiogram with cardioversion (s)  on Mitesh PAWAN Flood   2.   I recognize that during the surgical operation/procedure, unforeseen conditions may necessitate additional or different procedures than those listed above.  I, therefore, further authorize and request that the above-named surgeon, assistants, or designees perform such procedures as are, in their judgment, necessary and desirable.    3.   My surgeon/physician has discussed prior to my surgery the potential benefits, risks and side effects of this procedure; the likelihood of achieving goals; and potential problems that might occur during recuperation.  They also discussed reasonable alternatives to the procedure, including risks, benefits, and side effects related to the alternatives and risks related to not receiving this procedure.  I have had all my questions answered and I acknowledge that no guarantee has been made as to the result that may be obtained.    4.   Should the need arise during my operation/procedure, which includes change of level of care prior to discharge, I also consent to the administration of blood and/or blood products.  Further, I understand that despite careful testing and screening of blood or blood products by collecting agencies, I may still be subject to ill effects as a result of receiving a blood transfusion and/or blood products.  The following are  some, but not all, of the potential risks that can occur: fever and allergic reactions, hemolytic reactions, transmission of diseases such as Hepatitis, AIDS and Cytomegalovirus (CMV) and fluid overload.  In the event that I wish to have an autologous transfusion of my own blood, or a directed donor transfusion, I will discuss this with my physician.  Check only if Refusing Blood or Blood Products  I understand refusal of blood or blood products as deemed necessary by my physician may have serious consequences to my condition to include possible death. I hereby assume responsibility for my refusal and release the hospital, its personnel, and my physicians from any responsibility for the consequences of my refusal.          o  Refuse      5.   I authorize the use of any specimen, organs, tissues, body parts or foreign objects that may be removed from my body during the operation/procedure for diagnosis, research or teaching purposes and their subsequent disposal by hospital authorities.  I also authorize the release of specimen test results and/or written reports to my treating physician on the hospital medical staff or other referring or consulting physicians involved in my care, at the discretion of the Pathologist or my treating physician.    6.   I consent to the photographing or videotaping of the operations or procedures to be performed, including appropriate portions of my body for medical, scientific, or educational purposes, provided my identity is not revealed by the pictures or by descriptive texts accompanying them.  If the procedure has been photographed/videotaped, the surgeon will obtain the original picture, image, videotape or CD.  The hospital will not be responsible for storage, release or maintenance of the picture, image, tape or CD.    7.   I consent to the presence of a  or observers in the operating room as deemed necessary by my physician or their designees.    8.   I  recognize that in the event my procedure results in extended X-Ray/fluoroscopy time, I may develop a skin reaction.    9. If I have a Do Not Attempt Resuscitation (DNAR) order in place, that status will be suspended while in the operating room, procedural suite, and during the recovery period unless otherwise explicitly stated by me (or a person authorized to consent on my behalf). The surgeon or my attending physician will determine when the applicable recovery period ends for purposes of reinstating the DNAR order.  10. Patients having a sterilization procedure: I understand that if the procedure is successful the results will be permanent and it will therefore be impossible for me to inseminate, conceive, or bear children.  I also understand that the procedure is intended to result in sterility, although the result has not been guaranteed.   11. I acknowledge that my physician has explained sedation/analgesia administration to me including the risk and benefits I consent to the administration of sedation/analgesia as may be necessary or desirable in the judgment of my physician.    I CERTIFY THAT I HAVE READ AND FULLY UNDERSTAND THE ABOVE CONSENT TO OPERATION and/or OTHER PROCEDURE.    _________________________________________  __________________________________  Signature of Patient     Signature of Responsible Person         ___________________________________         Printed Name of Responsible Person           _________________________________                 Relationship to Patient  _________________________________________  ______________________________  Signature of Witness          Date  Time      Patient Name: Mitesh Flood     : 1965                 Printed: May 2, 2024     Medical Record #: [id number]                     Page [address] of [address] [place] [address], [place]  [address]    Consent for Anesthesia    I, Mitesh Flood agree to  be cared for by an anesthesiologist, who is specially trained to monitor me and give me medicine to put me to sleep or keep me comfortable during my procedure    I understand that my anesthesiologist is not an employee or agent of [facility] or [employer]. He or she works for [employer].    As the patient asking for anesthesia services, I agree to:  Allow the anesthesiologist (anesthesia doctor) to give me medicine and do additional procedures as necessary. Some examples are: Starting or using an “IV” to give me medicine, fluids or blood during my procedure, and having a breathing tube placed to help me breathe when I’m asleep (intubation). In the event that my heart stops working properly, I understand that my anesthesiologist will make every effort to sustain my life, unless otherwise directed by [facility] Do Not Resuscitate documents.  Tell my anesthesia doctor before my procedure:  If I am pregnant.  The last time that I ate or drank.  All of the medicines I take (including prescriptions, herbal supplements, and pills I can buy without a prescription (including street drugs/illegal medications). Failure to inform my anesthesiologist about these medicines may increase my risk of anesthetic complications.  If I am allergic to anything or have had a reaction to anesthesia before.  I understand how the anesthesia medicine will help me (benefits).  I understand that with any type of anesthesia medicine there are risks:  The most common risks are: nausea, vomiting, sore throat, muscle soreness, damage to my eyes, mouth, or teeth (from breathing tube placement).  Rare risks include: remembering what happened during my procedure, allergic reactions to medications, injury to my airway, heart, lungs, vision, nerves, or muscles and in extremely rare instances death.  My doctor has explained to me other choices available to me for my care (alternatives).  Pregnant Patients  (“epidural”):  I understand that the risks of having an epidural (medicine given into my back to help control pain during labor), include itching, low blood pressure, difficulty urinating, headache or slowing of the baby’s heart. Very rare risks include infection, bleeding, seizure, irregular heart rhythms and nerve injury.  Regional Anesthesia (“spinal”, “epidural”, & “nerve blocks”):  I understand that rare but potential complications include headache, bleeding, infection, seizure, irregular heart rhythms, and nerve injury.    I can change my mind about having anesthesia services at any time before I get the medicine.    _____________________________________________________________________________  Patient (or Representative) Signature/Relationship to Patient  Date   Time    _____________________________________________________________________________   Name (if used)    Language/Organization   Time    _____________________________________________________________________________  Anesthesiologist Signature     Date   Time  I have discussed the procedure and information above with the patient (or patient’s representative) and answered their questions. The patient or their representative has agreed to have anesthesia services.    _____________________________________________________________________________  Witness        Date   Time  I have verified that the signature is that of the patient or patient’s representative, and that it was signed before the procedure  Patient Name: Mitesh Flood     : 1965                 Printed: May 2, 2024     Medical Record #: [id number]                     Page 2 of 2

## (undated) NOTE — LETTER
[address]  [address]  Consent for Procedure/Sedation  Date: 4/30/24         Time: 1240    I hereby authorize Dr. Jamison, my physician and his/her assistants (if applicable), which may include medical students, residents, and/or fellows, to perform the following surgical operation/ procedure and administer such anesthesia as may be determined necessary by my physician:  Operation/Procedure name (s)  Cardiac Catheterization, Left Ventricular Cineangiography, Bilateral Selective Coronary Angiography and/or Right Heart Catheterization; possible Percutaneous Transluminal Coronary Angioplasty, Coronary Atherectomy, Coronary Stent, Intracoronary Thrombolytic therapy, Antiplatelet therapy and/or Intravascular Ultrasound on Mitesh Flood   2.   I recognize that during the surgical operation/procedure, unforeseen conditions may necessitate additional or different procedures than those listed above.  I, therefore, further authorize and request that the above-named surgeon, assistants, or designees perform such procedures as are, in their judgment, necessary and desirable.    3.   My surgeon/physician has discussed prior to my surgery the potential benefits, risks and side effects of this procedure; the likelihood of achieving goals; and potential problems that might occur during recuperation.  They also discussed reasonable alternatives to the procedure, including risks, benefits, and side effects related to the alternatives and risks related to not receiving this procedure.  I have had all my questions answered and I acknowledge that no guarantee has been made as to the result that may be obtained.    4.   Should the need arise during my operation/procedure, which includes change of level of care prior to discharge, I also consent to the administration of blood and/or blood products.  Further, I understand that despite careful testing and screening of blood or blood products by  collecting agencies, I may still be subject to ill effects as a result of receiving a blood transfusion and/or blood products.  The following are some, but not all, of the potential risks that can occur: fever and allergic reactions, hemolytic reactions, transmission of diseases such as Hepatitis, AIDS and Cytomegalovirus (CMV) and fluid overload.  In the event that I wish to have an autologous transfusion of my own blood, or a directed donor transfusion, I will discuss this with my physician.  Check only if Refusing Blood or Blood Products  I understand refusal of blood or blood products as deemed necessary by my physician may have serious consequences to my condition to include possible death. I hereby assume responsibility for my refusal and release the hospital, its personnel, and my physicians from any responsibility for the consequences of my refusal.          o  Refuse      5.   I authorize the use of any specimen, organs, tissues, body parts or foreign objects that may be removed from my body during the operation/procedure for diagnosis, research or teaching purposes and their subsequent disposal by hospital authorities.  I also authorize the release of specimen test results and/or written reports to my treating physician on the hospital medical staff or other referring or consulting physicians involved in my care, at the discretion of the Pathologist or my treating physician.    6.   I consent to the photographing or videotaping of the operations or procedures to be performed, including appropriate portions of my body for medical, scientific, or educational purposes, provided my identity is not revealed by the pictures or by descriptive texts accompanying them.  If the procedure has been photographed/videotaped, the surgeon will obtain the original picture, image, videotape or CD.  The hospital will not be responsible for storage, release or maintenance of the picture, image, tape or CD.    7.   I consent  to the presence of a  or observers in the operating room as deemed necessary by my physician or their designees.    8.   I recognize that in the event my procedure results in extended X-Ray/fluoroscopy time, I may develop a skin reaction.    9. If I have a Do Not Attempt Resuscitation (DNAR) order in place, that status will be suspended while in the operating room, procedural suite, and during the recovery period unless otherwise explicitly stated by me (or a person authorized to consent on my behalf). The surgeon or my attending physician will determine when the applicable recovery period ends for purposes of reinstating the DNAR order.  10. Patients having a sterilization procedure: I understand that if the procedure is successful the results will be permanent and it will therefore be impossible for me to inseminate, conceive, or bear children.  I also understand that the procedure is intended to result in sterility, although the result has not been guaranteed.   11. I acknowledge that my physician has explained sedation/analgesia administration to me including the risk and benefits I consent to the administration of sedation/analgesia as may be necessary or desirable in the judgment of my physician.    I CERTIFY THAT I HAVE READ AND FULLY UNDERSTAND THE ABOVE CONSENT TO OPERATION and/or OTHER PROCEDURE.      ____________________________________       _________________________________      ______________________________  Signature of Patient         Signature of Responsible Person        Printed Name of Responsible Person   ____________________________________      _________________________________      ______________________________       Signature of Witness          Relationship to Patient                       Date                                       Time  Patient Name: Mitesh VILLALTA Lidiatima  : 1965    Reviewed: 2024   Printed: 2024  Medical Record #: [id number] Page 1  of 1

## (undated) NOTE — LETTER
10/28/21        [address] [facility] [address]      Dear Justin Torres,    [facility] records indicate that you have outstanding lab work that was ordered for you and has not yet been completed:      CBC With Differential With Platelet

## (undated) NOTE — LETTER
Requirements for Pre-Operative Clearance Requests  **All Fields Must Be Completed**    2024    Dear Surgeon,    We have been notified that your patient Mitesh Flood  1965, is scheduled for surgery and that you would like us to clear him/her for this procedure.  In order to comply with governmental coding requirements and in order to bill for our services, the following is required for us to be able to see this patient for pre-operative clearance.     Pre-op appointment is scheduled on 24 with Ritu Mendez NP    Comorbid diagnosis for which clearance is requested:          Surgical Diagnosis:            Procedure:           Surgeon:            Date of surgery:          Length of Surgery:      __________________________________________  Type of anesthesia:     __________________________________________  Location of surgery:           Phone:          Fax:         Completed pre-operative clearance should be faxed to:    Attention:          Phone:         Fax:         Check appropriate boxes:    ¨ Pre-op testing ordered by surgeon  ¨ Pre-op testing to be ordered by pre-admission testing  ¨ No pre-op testing needed  ¨ This Pre Op Appt ok to be signed by NP  When this form is complete, please fax back to [phone]

## (undated) NOTE — LETTER
Your patient was recently seen at the [facility] for a hospital follow-up visit. The visit note is attached. Please contact the clinic with any questions at [phone].    Thank you,  DHIRAJ Arenas